# Patient Record
Sex: FEMALE | Race: WHITE | NOT HISPANIC OR LATINO | Employment: FULL TIME | URBAN - METROPOLITAN AREA
[De-identification: names, ages, dates, MRNs, and addresses within clinical notes are randomized per-mention and may not be internally consistent; named-entity substitution may affect disease eponyms.]

---

## 2017-02-10 ENCOUNTER — RADIANT APPOINTMENT (OUTPATIENT)
Dept: MAMMOGRAPHY | Facility: CLINIC | Age: 52
End: 2017-02-10
Attending: FAMILY MEDICINE

## 2017-02-10 DIAGNOSIS — R92.8 ABNORMAL MAMMOGRAM OF BOTH BREASTS: ICD-10-CM

## 2017-05-18 ENCOUNTER — TRANSFERRED RECORDS (OUTPATIENT)
Dept: HEALTH INFORMATION MANAGEMENT | Facility: CLINIC | Age: 52
End: 2017-05-18

## 2017-06-10 ENCOUNTER — HEALTH MAINTENANCE LETTER (OUTPATIENT)
Age: 52
End: 2017-06-10

## 2017-06-22 ASSESSMENT — ENCOUNTER SYMPTOMS
CONSTIPATION: 0
PANIC: 0
BOWEL INCONTINENCE: 0
NERVOUS/ANXIOUS: 1
MUSCLE CRAMPS: 0
ABDOMINAL PAIN: 0
DECREASED LIBIDO: 1
MYALGIAS: 0
HOT FLASHES: 1
NECK PAIN: 0
BLOOD IN STOOL: 0
MUSCLE WEAKNESS: 0
DEPRESSION: 0
HEARTBURN: 0
STIFFNESS: 0
ARTHRALGIAS: 1
DIARRHEA: 0
INSOMNIA: 0
JAUNDICE: 0
RECTAL BLEEDING: 0
VOMITING: 0
BACK PAIN: 0
RECTAL PAIN: 0
BLOATING: 1
DECREASED CONCENTRATION: 0
JOINT SWELLING: 0
NAUSEA: 0

## 2017-06-22 ASSESSMENT — ANXIETY QUESTIONNAIRES
6. BECOMING EASILY ANNOYED OR IRRITABLE: SEVERAL DAYS
7. FEELING AFRAID AS IF SOMETHING AWFUL MIGHT HAPPEN: SEVERAL DAYS
7. FEELING AFRAID AS IF SOMETHING AWFUL MIGHT HAPPEN: SEVERAL DAYS
5. BEING SO RESTLESS THAT IT IS HARD TO SIT STILL: SEVERAL DAYS
GAD7 TOTAL SCORE: 7
GAD7 TOTAL SCORE: 7
3. WORRYING TOO MUCH ABOUT DIFFERENT THINGS: SEVERAL DAYS
1. FEELING NERVOUS, ANXIOUS, OR ON EDGE: SEVERAL DAYS
GAD7 TOTAL SCORE: 7
4. TROUBLE RELAXING: SEVERAL DAYS
2. NOT BEING ABLE TO STOP OR CONTROL WORRYING: SEVERAL DAYS

## 2017-06-23 ASSESSMENT — ANXIETY QUESTIONNAIRES: GAD7 TOTAL SCORE: 7

## 2017-06-27 ENCOUNTER — OFFICE VISIT (OUTPATIENT)
Dept: PSYCHOLOGY | Facility: CLINIC | Age: 52
End: 2017-06-27
Payer: COMMERCIAL

## 2017-06-27 DIAGNOSIS — F41.9 ANXIETY DISORDER, UNSPECIFIED TYPE: Primary | ICD-10-CM

## 2017-06-27 NOTE — MR AVS SNAPSHOT
After Visit Summary   6/27/2017    Nkechi Manrique    MRN: [id number]           Patient Information     Date Of Birth          1965        Visit Information        Provider Department      6/27/2017 2:00 PM Kellie Granados, PhD  [facility]         Today's Diagnoses     Anxiety disorder, unspecified type    -  1       Follow-ups after your visit        Your next 10 appointments already scheduled     Jul 05, 2017 11:00 AM CDT   Return Visit with Kellie Granados, PhD SHANNON   [facility]  ([facility])    [facility]  [address]  [address]  [facility] [address] [phone]            Jul 19, 2017  2:00 PM CDT   Return Visit with Kellie Granados, PhD ORTEGA   [facility]  ([facility])    [facility]  [address]  [address]  [facility] [id number] [phone]            Aug 11, 2017  3:45 PM CDT   New Patient Visit with Maria C Walsh MD   [facility] ([facility])    [facility] [address]  [address]  [address]  [facility] [id number] [phone]              Who to contact     Please call your clinic at [phone] to:    Ask questions about your health    Make or cancel appointments    Discuss your medicines    Learn about your test results    Speak to your doctor   If you have compliments or concerns about an experience at your clinic, or if you wish to file a complaint, please contact [facility] Patient Relations at [phone] or email us at [email or web address]         Additional Information About Your Visit        MyChart Information     [employer] gives you secure access to your electronic health record. If you see a primary care provider, you can also send messages to your care team and make appointments. If you have questions, please call your  primary care clinic.  If you do not have a primary care provider, please call [phone] and they will assist you.      [email or web address] is an electronic gateway that provides easy, online access to your medical records. With [email or web address], you can request a clinic appointment, read your test results, renew a prescription or communicate with your care team.     To access your existing account, please contact your [facility] or call [phone] for assistance.        Care EveryWhere ID     This is your Care EveryWhere ID. This could be used by other organizations to access your [place] medical records  [id number]         Blood Pressure from Last 3 Encounters:   06/28/17 110/72   11/06/15 117/68   10/28/14 92/60    Weight from Last 3 Encounters:   06/28/17 89 kg (196 lb 4.8 oz)   11/02/15 79.4 kg (175 lb)   10/28/14 79.4 kg (175 lb)              We Performed the Following     New Patient Visit - Psychiatric diagnostic interview examination (81877)        Primary Care Provider Office Phone # Fax #    Dinorah Alonso [phone]       [facility] [address] [address]        Equal Access to Services     KAREN DAVID AH: Hadii aad ku hadasho Soomaali, waaxda luqadaha, qaybta kaalmada adeegyada, waxay idiin hayaan adeeg khdebbie layg hinson. So [facility] [phone].    ATENCIÓN: Si habla español, tiene a phelps disposición servicios gratuitos de asistencia lingüística. Llame al [phone].    We comply with applicable federal civil rights laws and [place] laws. We do not discriminate on the basis of race, color, national origin, age, disability sex, sexual orientation or gender identity.            Thank you!     Thank you for choosing [facility]   for your care. Our goal is always to provide you with excellent care. Hearing back from our patients is one way we can continue to improve our services. Please take a few minutes to  complete the written survey that you may receive in the mail after your visit with us. Thank you!             Your Updated Medication List - Protect others around you: Learn how to safely use, store and throw away your medicines at www.[employer]eds.org.          This list is accurate as of: 6/27/17 11:59 PM.  Always use your most recent med list.                   Brand Name Dispense Instructions for use Diagnosis    albuterol 108 (90 BASE) MCG/ACT Inhaler    albuterol    2 Inhaler    Inhale 1-2 puffs into the lungs every 6 hours as needed for shortness of breath / dyspnea    Mild persistent asthma without complication       CALCIUM 500 + D 500-200 MG-IU Tabs      1 po qd    Gynecological examination       CLARITIN PO      1 TABLET DAILY        * fluticasone 110 MCG/ACT Inhaler    FLOVENT HFA    1 Inhaler    Inhale 2 puffs into the lungs 2 times daily    Mild persistent asthma       * fluticasone 110 MCG/ACT Inhaler    FLOVENT HFA    3 Inhaler    Inhale 2 puffs into the lungs 2 times daily    Mild persistent asthma without complication       fluticasone 50 MCG/ACT spray    FLONASE     Spray 2 sprays into both nostrils daily        ketoprofen 10% in PLO 10% topical gel     30 g    Apply a pea-sized amount to the CMC joint on the Right    CMC arthritis       omega 3 1000 MG Caps      None Entered        triamcinolone 0.1 % cream    KENALOG    80 g    Apply sparingly to affected area three times daily as needed for 1-2 weeks    Eczema, unspecified type       vitamin B complex with vitamin C Tabs tablet      Take 1 tablet by mouth daily        VITAMIN C PO           vitamin D 2000 UNITS Caps      Take 1 capsule by mouth daily.        * Notice:  This list has 2 medication(s) that are the same as other medications prescribed for you. Read the directions carefully, and ask your doctor or other care provider to review them with you.

## 2017-06-28 ENCOUNTER — OFFICE VISIT (OUTPATIENT)
Dept: INTERNAL MEDICINE | Facility: CLINIC | Age: 52
End: 2017-06-28
Attending: INTERNAL MEDICINE
Payer: COMMERCIAL

## 2017-06-28 VITALS
WEIGHT: 196.3 LBS | HEIGHT: 68 IN | DIASTOLIC BLOOD PRESSURE: 72 MMHG | HEART RATE: 80 BPM | BODY MASS INDEX: 29.75 KG/M2 | SYSTOLIC BLOOD PRESSURE: 110 MMHG

## 2017-06-28 DIAGNOSIS — R63.5 WEIGHT GAIN: Primary | ICD-10-CM

## 2017-06-28 DIAGNOSIS — T63.441A ALLERGIC REACTION TO BEE STING: ICD-10-CM

## 2017-06-28 RX ORDER — EPINEPHRINE 0.3 MG/.3ML
0.3 INJECTION SUBCUTANEOUS
Qty: 0.3 ML | Refills: 3 | Status: SHIPPED | OUTPATIENT
Start: 2017-06-28 | End: 2019-06-12

## 2017-06-28 ASSESSMENT — ENCOUNTER SYMPTOMS
NUMBNESS: 0
HOARSE VOICE: 0
NAUSEA: 0
TROUBLE SWALLOWING: 0
FATIGUE: 0
POOR WOUND HEALING: 0
TINGLING: 0
PANIC: 0
SPEECH CHANGE: 0
PALPITATIONS: 0
HOT FLASHES: 1
WEIGHT GAIN: 0
FEVER: 0
BRUISES/BLEEDS EASILY: 0
RESPIRATORY PAIN: 0
SINUS PAIN: 0
EYE WATERING: 0
WEAKNESS: 0
NIGHT SWEATS: 0
HEARTBURN: 0
POLYDIPSIA: 0
BLOATING: 1
DIARRHEA: 0
HEMATURIA: 0
HEADACHES: 0
NERVOUS/ANXIOUS: 1
INSOMNIA: 0
DOUBLE VISION: 0
EXTREMITY NUMBNESS: 0
CLAUDICATION: 0
MYALGIAS: 0
SEIZURES: 0
HEMOPTYSIS: 0
SKIN CHANGES: 0
BREAST PAIN: 0
POLYPHAGIA: 0
SORE THROAT: 0
TACHYCARDIA: 0
ALTERED TEMPERATURE REGULATION: 0
ABDOMINAL PAIN: 0
EYE REDNESS: 0
SNORES LOUDLY: 0
SPUTUM PRODUCTION: 0
FLANK PAIN: 0
MUSCLE CRAMPS: 0
ORTHOPNEA: 0
TASTE DISTURBANCE: 0
SLEEP DISTURBANCES DUE TO BREATHING: 0
SINUS CONGESTION: 0
DYSURIA: 0
VOMITING: 0
BACK PAIN: 0
COUGH DISTURBING SLEEP: 0
NECK MASS: 0
COUGH: 0
WEIGHT LOSS: 0
RECTAL PAIN: 0
DYSPNEA ON EXERTION: 0
POSTURAL DYSPNEA: 0
PARALYSIS: 0
CHILLS: 0
SHORTNESS OF BREATH: 0
EYE PAIN: 0
DEPRESSION: 0
LIGHT-HEADEDNESS: 0
DECREASED LIBIDO: 1
WHEEZING: 0
HYPOTENSION: 0
TREMORS: 0
EXERCISE INTOLERANCE: 0
INCREASED ENERGY: 0
CONSTIPATION: 0
LOSS OF CONSCIOUSNESS: 0
STIFFNESS: 0
DECREASED CONCENTRATION: 0
ARTHRALGIAS: 1
DECREASED APPETITE: 0
MEMORY LOSS: 0
MUSCLE WEAKNESS: 0
NECK PAIN: 0
DIZZINESS: 0
BLOOD IN STOOL: 0
DIFFICULTY URINATING: 0
RECTAL BLEEDING: 0
EYE IRRITATION: 0
JAUNDICE: 0
NAIL CHANGES: 0
LEG PAIN: 0
SMELL DISTURBANCE: 0
JOINT SWELLING: 0
DISTURBANCES IN COORDINATION: 0
BOWEL INCONTINENCE: 0
SYNCOPE: 0
SWOLLEN GLANDS: 0
LEG SWELLING: 0
HALLUCINATIONS: 0
BREAST MASS: 0
HYPERTENSION: 0

## 2017-06-28 ASSESSMENT — PAIN SCALES - GENERAL: PAINLEVEL: NO PAIN (0)

## 2017-06-28 NOTE — PROGRESS NOTES
SUBJECTIVE:   CC: Nkechi Manrique is an 51 year old woman who presents for preventive health visit.     Healthy Habits:    Do you get at least three servings of calcium containing foods daily (dairy, green leafy vegetables, etc.)? yes    Amount of exercise or daily activities, outside of work: working out 5 times a week (strength training, running, biking)    Problems taking medications regularly No    Medication side effects: No    Have you had an eye exam in the past two years? yes    Do you see a dentist twice per year? yes    Do you have sleep apnea, excessive snoring or daytime drowsiness?no    Patient is concerned about weight gain. She has been feeling tired. She also reports mood swings (anxiety and feeling overwhelmed). She states that about a year ago she has noticed weight gain without significant change in her daily life. She reports that she gained about 20 lbs in a year. She reports that her periods have stopped last spring. She has had occasional hot flashes. Patient has been sleeping well, waking up fully rested. She has had some changes at work. 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 She was on a steroid inhaler last year for asthma. The inhaler was stopped as she did not need it anymore. She reports that she has not been feeling as hungry.       -------------------------------------    Today's PHQ-2 Score:   PHQ-2 ( 1999 Pf[employer]) 6/22/2017 11/6/2015   Q1: Little interest or pleasure in doing things 0 0   Q2: Feeling down, depressed or hopeless 0 0   PHQ-2 Score 0 0   Q1: Little interest or pleasure in doing things Not at all -   Q2: Feeling down, depressed or hopeless Not at all -   PHQ-2 Score 0 -       Abuse: Current or Past(Physical, Sexual or Emotional)- No  Do you feel safe in your environment - Yes    Social History   Substance Use Topics     Smoking status: Never Smoker     Smokeless tobacco: Never Used     Alcohol use 0.6 oz/week     1 Standard drinks  or equivalent per week      Comment: social, ~1-2/wk     The patient does not drink >3 drinks per day nor >7 drinks per week.    Reviewed orders with patient.  Reviewed health maintenance and updated orders accordingly - Yes  Labs reviewed in [facility]    Patient over age 50, mutual decision to screen reflected in health maintenance.    Pertinent mammograms are reviewed under the imaging tab.  History of abnormal Pap smear: NO - age 30-65 PAP every 5 years with negative HPV co-testing recommended    Reviewed and updated as needed this visit by clinical staff  Tobacco  Allergies  Meds         Reviewed and updated as needed this visit by Provider        Past Medical History:   Diagnosis Date     Breast lump 9/27/2013     CMC arthritis, thumb, degenerative 11/3/2015     Congenital deficiency of other clotting factors     Factor V Leiden positive, had superficial thrombus, no DVT     Congenital deficiency of other clotting factors      Mild intermittent asthma with exacerbation     seasonally related, rare albuterol     Plantar fascia syndrome     L ft in '10, R ft in '11     Seasonal allergic rhinitis     claritin spring/August     Stress fracture of lower leg ~2002, 2009      Past Surgical History:   Procedure Laterality Date     PHLEBECTOMY MULTIPLE STAB  10/15    [place] [facility], Dr. Ramirez       ROS:  Review of Systems     Constitutional:  Negative for fever, chills, weight loss, weight gain, fatigue, decreased appetite, night sweats, recent stressors, height gain, height loss, post-operative complications, incisional pain, hallucinations, increased energy, hyperactivity and confused.   HENT:  Negative for ear pain, hearing loss, tinnitus, nosebleeds, trouble swallowing, hoarse voice, mouth sores, sore throat, ear discharge, tooth pain, gum tenderness, taste disturbance, smell disturbance, hearing aid, bleeding gums, dry mouth, sinus pain, sinus congestion and neck mass.    Eyes:  Negative for double vision, pain, redness, eye  pain, decreased vision, eye watering, eye bulging, eye dryness, flashing lights, spots, floaters, strabismus, tunnel vision, jaundice and eye irritation.   Respiratory:   Negative for cough, hemoptysis, sputum production, shortness of breath, wheezing, sleep disturbances due to breathing, snores loudly, respiratory pain, dyspnea on exertion, cough disturbing sleep and postural dyspnea.    Cardiovascular:  Negative for chest pain, dyspnea on exertion, palpitations, orthopnea, claudication, leg swelling, fingers/toes turn blue, hypertension, hypotension, syncope, history of heart murmur, chest pain on exertion, chest pain at rest, pacemaker, few scattered varicosities, leg pain, sleep disturbances due to breathing, tachycardia, light-headedness, exercise intolerance and edema.   Gastrointestinal:  Positive for bloating. Negative for heartburn, nausea, vomiting, abdominal pain, diarrhea, constipation, blood in stool, melena, rectal pain, hemorrhoids, bowel incontinence, jaundice, rectal bleeding, coffee ground emesis and change in stool.   Genitourinary:  Positive for decreased libido, hot flashes and vaginal dryness. Negative for bladder incontinence, dysuria, urgency, hematuria, flank pain, vaginal discharge, difficulty urinating, genital sores, dyspareunia, nocturia, voiding less frequently, arousal difficulty, abnormal vaginal bleeding, excessive menstruation, menstrual changes and postmenopausal bleeding.   Musculoskeletal:  Positive for arthralgias. Negative for myalgias, back pain, joint swelling, stiffness, muscle cramps, neck pain, bone pain, muscle weakness and fracture.   Skin:  Negative for nail changes, itching, poor wound healing, rash, hair changes, skin changes, acne, warts, poor wound healing, scarring, flaky skin, Raynaud's phenomenon, sensitivity to sunlight and skin thickening.   Neurological:  Negative for dizziness, tingling, tremors, speech change, seizures, loss of consciousness, weakness,  "light-headedness, numbness, headaches, disturbances in coordination, extremity numbness, memory loss, difficulty walking and paralysis.   Endo/Heme:  Negative for anemia, swollen glands and bruises/bleeds easily.   Psychiatric/Behavioral:  Positive for mood swings. Negative for depression, hallucinations, memory loss, decreased concentration and panic attacks.    Breast:  Negative for breast discharge, breast mass, breast pain and nipple retraction.   Endocrine:  Negative for altered temperature regulation, polyphagia, polydipsia, unwanted hair growth and change in facial hair.      CONSTITUTIONAL:fatigue  I: NEGATIVE for worrisome rashes, moles or lesions  E: NEGATIVE for vision changes or irritation  ENT: NEGATIVE for ear, mouth and throat problems  R: NEGATIVE for significant cough or SOB  B: NEGATIVE for masses, tenderness or discharge  CV: NEGATIVE for chest pain, palpitations or peripheral edema  GI: NEGATIVE for nausea, abdominal pain, heartburn, or change in bowel habits  : NEGATIVE for unusual urinary or vaginal symptoms. No vaginal bleeding.  M: NEGATIVE for significant arthralgias or myalgia  N: NEGATIVE for weakness, dizziness or paresthesias  PSYCHIATRIC: anxiety     OBJECTIVE:   /72  Pulse 80  Ht 1.727 m (5' 8\")  Wt 89 kg (196 lb 4.8 oz)  LMP 10/16/2015 (Exact Date)  Breastfeeding? No  BMI 29.85 kg/m2  EXAM:  GENERAL: healthy, alert and no distress  EYES: Eyes grossly normal to inspection  HENT: normal cephalic/atraumatic and oral mucous membranes moist  NECK: no asymmetry, masses, or scars  RESP: normal effort  CV: regular rates and rhythm and no peripheral edema  ABDOMEN: soft, nontender, no hepatosplenomegaly, no masses and bowel sounds normal  MS: no gross musculoskeletal defects noted, no edema  SKIN: no suspicious lesions or rashes  NEURO: Normal strength and tone, mentation intact and speech normal  PSYCH: mentation appears normal, affect normal/bright    ASSESSMENT/PLAN:   1. " "Routine preventive health.   Discussed vaccinations, breast and colon cancer screening. Patient is up to date at this time.     2. Weight gain  Extensive discussion of possible causes of weight gain with patient. Discussed lifestyle modifications approach. Patient is not convinced that the excess calories is the only reason for her weight changes. Recommend evaluation by [facility] once medical causes of excess weight have been excluded. Patient is in agreement with the plan.   - Hepatic Panel; Future  - Ferritin; Future  - Basic Metabolic Panel; Future  - CBC with Platelets; Future  - TSH with free T4 reflex; Future    2. Allergic reaction to bee sting  Refill for EpiPen was given to the patient.   - EPINEPHrine 0.3 MG/0.3ML injection; Inject 0.3 mLs (0.3 mg) into the muscle once as needed for anaphylaxis  Dispense: 0.3 mL; Refill: 3        COUNSELING:   Reviewed preventive health counseling, as reflected in patient instructions       Regular exercise       Healthy diet/nutrition    BP Screening:   Last 3 BP Readings:    BP Readings from Last 3 Encounters:   06/28/17 110/72   11/06/15 117/68   10/28/14 92/60       The following was recommended to the patient:  Re-screen BP within a year and recommended lifestyle modifications     reports that she has never smoked. She has never used smokeless tobacco.    Estimated body mass index is 29.85 kg/(m^2) as calculated from the following:    Height as of this encounter: 1.727 m (5' 8\").    Weight as of this encounter: 89 kg (196 lb 4.8 oz).       Counseling Resources:  ATP IV Guidelines  Pooled Cohorts Equation Calculator  Breast Cancer Risk Calculator  FRAX Risk Assessment  ICSI Preventive Guidelines  Dietary Guidelines for Americans, 2010  USDA's MyPlate  ASA Prophylaxis  Lung CA Screening    Katherine Angela MD  [facility]   "

## 2017-06-28 NOTE — LETTER
6/28/2017       RE: Nkechi Manrique  [address] [facility] [address]     Dear Colleague,    Thank you for referring your patient, Nkechi Manrique, to the [facility]  at [facility]. Please see a copy of my visit note below.       SUBJECTIVE:   CC: Nkechi Manrique is an 51 year old woman who presents for preventive health visit.     Healthy Habits:    Do you get at least three servings of calcium containing foods daily (dairy, green leafy vegetables, etc.)? yes    Amount of exercise or daily activities, outside of work: working out 5 times a week (strength training, running, biking)    Problems taking medications regularly No    Medication side effects: No    Have you had an eye exam in the past two years? yes    Do you see a dentist twice per year? yes    Do you have sleep apnea, excessive snoring or daytime drowsiness?no    Patient is concerned about weight gain. She has been feeling tired. She also reports mood swings (anxiety and feeling overwhelmed). She states that about a year ago she has noticed weight gain without significant change in her daily life. She reports that she gained about 20 lbs in a year. She reports that her periods have stopped last spring. She has had occasional hot flashes. Patient has been sleeping well, waking up fully rested. She has had some changes at work. 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 She was on a steroid inhaler last year for asthma. The inhaler was stopped as she did not need it anymore. She reports that she has not been feeling as hungry.       -------------------------------------    Today's PHQ-2 Score:   PHQ-2 ( 1999 Pf[employer]) 6/22/2017 11/6/2015   Q1: Little interest or pleasure in doing things 0 0   Q2: Feeling down, depressed or hopeless 0 0   PHQ-2 Score 0 0   Q1: Little interest or pleasure in doing things Not at all -   Q2: Feeling down, depressed or  hopeless Not at all -   PHQ-2 Score 0 -       Abuse: Current or Past(Physical, Sexual or Emotional)- No  Do you feel safe in your environment - Yes    Social History   Substance Use Topics     Smoking status: Never Smoker     Smokeless tobacco: Never Used     Alcohol use 0.6 oz/week     1 Standard drinks or equivalent per week      Comment: social, ~1-2/wk     The patient does not drink >3 drinks per day nor >7 drinks per week.    Reviewed orders with patient.  Reviewed health maintenance and updated orders accordingly - Yes  Labs reviewed in [facility]    Patient over age 50, mutual decision to screen reflected in health maintenance.    Pertinent mammograms are reviewed under the imaging tab.  History of abnormal Pap smear: NO - age 30-65 PAP every 5 years with negative HPV co-testing recommended    Reviewed and updated as needed this visit by clinical staff  Tobacco  Allergies  Meds         Reviewed and updated as needed this visit by Provider        Past Medical History:   Diagnosis Date     Breast lump 9/27/2013     CMC arthritis, thumb, degenerative 11/3/2015     Congenital deficiency of other clotting factors     Factor V Leiden positive, had superficial thrombus, no DVT     Congenital deficiency of other clotting factors      Mild intermittent asthma with exacerbation     seasonally related, rare albuterol     Plantar fascia syndrome     L ft in '10, R ft in '11     Seasonal allergic rhinitis     claritin spring/August     Stress fracture of lower leg ~2002, 2009      Past Surgical History:   Procedure Laterality Date     PHLEBECTOMY MULTIPLE STAB  10/15    [place] [facility], Dr. Ramirez       ROS:  Review of Systems     Constitutional:  Negative for fever, chills, weight loss, weight gain, fatigue, decreased appetite, night sweats, recent stressors, height gain, height loss, post-operative complications, incisional pain, hallucinations, increased energy, hyperactivity and confused.   HENT:  Negative for ear pain, hearing  loss, tinnitus, nosebleeds, trouble swallowing, hoarse voice, mouth sores, sore throat, ear discharge, tooth pain, gum tenderness, taste disturbance, smell disturbance, hearing aid, bleeding gums, dry mouth, sinus pain, sinus congestion and neck mass.    Eyes:  Negative for double vision, pain, redness, eye pain, decreased vision, eye watering, eye bulging, eye dryness, flashing lights, spots, floaters, strabismus, tunnel vision, jaundice and eye irritation.   Respiratory:   Negative for cough, hemoptysis, sputum production, shortness of breath, wheezing, sleep disturbances due to breathing, snores loudly, respiratory pain, dyspnea on exertion, cough disturbing sleep and postural dyspnea.    Cardiovascular:  Negative for chest pain, dyspnea on exertion, palpitations, orthopnea, claudication, leg swelling, fingers/toes turn blue, hypertension, hypotension, syncope, history of heart murmur, chest pain on exertion, chest pain at rest, pacemaker, few scattered varicosities, leg pain, sleep disturbances due to breathing, tachycardia, light-headedness, exercise intolerance and edema.   Gastrointestinal:  Positive for bloating. Negative for heartburn, nausea, vomiting, abdominal pain, diarrhea, constipation, blood in stool, melena, rectal pain, hemorrhoids, bowel incontinence, jaundice, rectal bleeding, coffee ground emesis and change in stool.   Genitourinary:  Positive for decreased libido, hot flashes and vaginal dryness. Negative for bladder incontinence, dysuria, urgency, hematuria, flank pain, vaginal discharge, difficulty urinating, genital sores, dyspareunia, nocturia, voiding less frequently, arousal difficulty, abnormal vaginal bleeding, excessive menstruation, menstrual changes and postmenopausal bleeding.   Musculoskeletal:  Positive for arthralgias. Negative for myalgias, back pain, joint swelling, stiffness, muscle cramps, neck pain, bone pain, muscle weakness and fracture.   Skin:  Negative for nail changes,  "itching, poor wound healing, rash, hair changes, skin changes, acne, warts, poor wound healing, scarring, flaky skin, Raynaud's phenomenon, sensitivity to sunlight and skin thickening.   Neurological:  Negative for dizziness, tingling, tremors, speech change, seizures, loss of consciousness, weakness, light-headedness, numbness, headaches, disturbances in coordination, extremity numbness, memory loss, difficulty walking and paralysis.   Endo/Heme:  Negative for anemia, swollen glands and bruises/bleeds easily.   Psychiatric/Behavioral:  Positive for mood swings. Negative for depression, hallucinations, memory loss, decreased concentration and panic attacks.    Breast:  Negative for breast discharge, breast mass, breast pain and nipple retraction.   Endocrine:  Negative for altered temperature regulation, polyphagia, polydipsia, unwanted hair growth and change in facial hair.      CONSTITUTIONAL:fatigue  I: NEGATIVE for worrisome rashes, moles or lesions  E: NEGATIVE for vision changes or irritation  ENT: NEGATIVE for ear, mouth and throat problems  R: NEGATIVE for significant cough or SOB  B: NEGATIVE for masses, tenderness or discharge  CV: NEGATIVE for chest pain, palpitations or peripheral edema  GI: NEGATIVE for nausea, abdominal pain, heartburn, or change in bowel habits  : NEGATIVE for unusual urinary or vaginal symptoms. No vaginal bleeding.  M: NEGATIVE for significant arthralgias or myalgia  N: NEGATIVE for weakness, dizziness or paresthesias  PSYCHIATRIC: anxiety     OBJECTIVE:   /72  Pulse 80  Ht 1.727 m (5' 8\")  Wt 89 kg (196 lb 4.8 oz)  LMP 10/16/2015 (Exact Date)  Breastfeeding? No  BMI 29.85 kg/m2  EXAM:  GENERAL: healthy, alert and no distress  EYES: Eyes grossly normal to inspection  HENT: normal cephalic/atraumatic and oral mucous membranes moist  NECK: no asymmetry, masses, or scars  RESP: normal effort  CV: regular rates and rhythm and no peripheral edema  ABDOMEN: soft, nontender, " "no hepatosplenomegaly, no masses and bowel sounds normal  MS: no gross musculoskeletal defects noted, no edema  SKIN: no suspicious lesions or rashes  NEURO: Normal strength and tone, mentation intact and speech normal  PSYCH: mentation appears normal, affect normal/bright    ASSESSMENT/PLAN:   1. Routine preventive health.   Discussed vaccinations, breast and colon cancer screening. Patient is up to date at this time.     2. Weight gain  Extensive discussion of possible causes of weight gain with patient. Discussed lifestyle modifications approach. Patient is not convinced that the excess calories is the only reason for her weight changes. Recommend evaluation by [facility] once medical causes of excess weight have been excluded. Patient is in agreement with the plan.   - Hepatic Panel; Future  - Ferritin; Future  - Basic Metabolic Panel; Future  - CBC with Platelets; Future  - TSH with free T4 reflex; Future    2. Allergic reaction to bee sting  Refill for EpiPen was given to the patient.   - EPINEPHrine 0.3 MG/0.3ML injection; Inject 0.3 mLs (0.3 mg) into the muscle once as needed for anaphylaxis  Dispense: 0.3 mL; Refill: 3        COUNSELING:   Reviewed preventive health counseling, as reflected in patient instructions       Regular exercise       Healthy diet/nutrition    BP Screening:   Last 3 BP Readings:    BP Readings from Last 3 Encounters:   06/28/17 110/72   11/06/15 117/68   10/28/14 92/60       The following was recommended to the patient:  Re-screen BP within a year and recommended lifestyle modifications     reports that she has never smoked. She has never used smokeless tobacco.    Estimated body mass index is 29.85 kg/(m^2) as calculated from the following:    Height as of this encounter: 1.727 m (5' 8\").    Weight as of this encounter: 89 kg (196 lb 4.8 oz).     Again, thank you for allowing me to participate in the care of your patient.      Katherine Angela MD    "

## 2017-06-29 DIAGNOSIS — R63.5 WEIGHT GAIN: ICD-10-CM

## 2017-06-29 LAB
ALBUMIN SERPL-MCNC: 4 G/DL (ref 3.4–5)
ALP SERPL-CCNC: 77 U/L (ref 40–150)
ALT SERPL W P-5'-P-CCNC: 26 U/L (ref 0–50)
ANION GAP SERPL CALCULATED.3IONS-SCNC: 9 MMOL/L (ref 3–14)
AST SERPL W P-5'-P-CCNC: 21 U/L (ref 0–45)
BILIRUB DIRECT SERPL-MCNC: 0.1 MG/DL (ref 0–0.2)
BILIRUB SERPL-MCNC: 0.4 MG/DL (ref 0.2–1.3)
BUN SERPL-MCNC: 13 MG/DL (ref 7–30)
CALCIUM SERPL-MCNC: 9.2 MG/DL (ref 8.5–10.1)
CHLORIDE SERPL-SCNC: 104 MMOL/L (ref 94–109)
CO2 SERPL-SCNC: 26 MMOL/L (ref 20–32)
CREAT SERPL-MCNC: 0.79 MG/DL (ref 0.52–1.04)
ERYTHROCYTE [DISTWIDTH] IN BLOOD BY AUTOMATED COUNT: 12.8 % (ref 10–15)
FERRITIN SERPL-MCNC: 30 NG/ML (ref 8–252)
GFR SERPL CREATININE-BSD FRML MDRD: 76 ML/MIN/1.7M2
GLUCOSE SERPL-MCNC: 94 MG/DL (ref 70–99)
HCT VFR BLD AUTO: 41.2 % (ref 35–47)
HGB BLD-MCNC: 13.7 G/DL (ref 11.7–15.7)
MCH RBC QN AUTO: 29.8 PG (ref 26.5–33)
MCHC RBC AUTO-ENTMCNC: 33.3 G/DL (ref 31.5–36.5)
MCV RBC AUTO: 90 FL (ref 78–100)
PLATELET # BLD AUTO: 191 10E9/L (ref 150–450)
POTASSIUM SERPL-SCNC: 4 MMOL/L (ref 3.4–5.3)
PROT SERPL-MCNC: 7.7 G/DL (ref 6.8–8.8)
RBC # BLD AUTO: 4.6 10E12/L (ref 3.8–5.2)
SODIUM SERPL-SCNC: 139 MMOL/L (ref 133–144)
TSH SERPL DL<=0.005 MIU/L-ACNC: 1.66 MU/L (ref 0.4–4)
WBC # BLD AUTO: 8.6 10E9/L (ref 4–11)

## 2017-06-29 PROCEDURE — 82728 ASSAY OF FERRITIN: CPT | Performed by: INTERNAL MEDICINE

## 2017-06-29 PROCEDURE — 80076 HEPATIC FUNCTION PANEL: CPT | Performed by: INTERNAL MEDICINE

## 2017-06-29 PROCEDURE — 85027 COMPLETE CBC AUTOMATED: CPT | Performed by: INTERNAL MEDICINE

## 2017-06-29 PROCEDURE — 80048 BASIC METABOLIC PNL TOTAL CA: CPT | Performed by: INTERNAL MEDICINE

## 2017-06-29 PROCEDURE — 84443 ASSAY THYROID STIM HORMONE: CPT | Performed by: INTERNAL MEDICINE

## 2017-06-29 PROCEDURE — 36415 COLL VENOUS BLD VENIPUNCTURE: CPT | Performed by: INTERNAL MEDICINE
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
## 2017-07-05 ENCOUNTER — OFFICE VISIT (OUTPATIENT)
Dept: PSYCHOLOGY | Facility: CLINIC | Age: 52
End: 2017-07-05
Payer: COMMERCIAL

## 2017-07-05 DIAGNOSIS — F41.9 ANXIETY DISORDER, UNSPECIFIED TYPE: Primary | ICD-10-CM

## 2017-07-05 NOTE — MR AVS SNAPSHOT
After Visit Summary   7/5/2017    Nkechi Manrique    MRN: [id number]           Patient Information     Date Of Birth          1965        Visit Information        Provider Department      7/5/2017 11:00 AM Kellie Granados, PhD  [facility]         Today's Diagnoses     Anxiety disorder, unspecified type    -  1       Follow-ups after your visit        Your next 10 appointments already scheduled     Jul 19, 2017  2:00 PM CDT   Return Visit with Kellie Granados, PhD    [facility]  ([facility])    [facility]  [address]  [address]  [facility] [address] [phone]            Aug 02, 2017  2:00 PM CDT   Return Visit with Kellie Granados, PhD    [facility]  ([facility])    [facility]  [address]  [address]  [facility] [id number] [phone]            Aug 08, 2017  2:00 PM CDT   Return Visit with Kellie Granados, PhD    [facility]  ([facility])    [facility]  [address]  [address]  [facility] [id number] [phone]            Aug 11, 2017  3:45 PM CDT   New Patient Visit with Maria C Walsh MD   [facility] ([facility])    [facility] [address]  [address]  [address]  [facility] [id number] [phone]              Who to contact     Please call your clinic at [phone] to:    Ask questions about your health    Make or cancel appointments    Discuss your medicines    Learn about your test results    Speak to your doctor   If you have compliments or concerns about an experience at your clinic, or if you wish to file a complaint, please contact [facility] Patient Relations at [phone] or email us at [email or web address]         Additional Information About  Your Visit        [employer] Information     [employer] gives you secure access to your electronic health record. If you see a primary care provider, you can also send messages to your care team and make appointments. If you have questions, please call your primary care clinic.  If you do not have a primary care provider, please call [phone] and they will assist you.      [employer] is an electronic gateway that provides easy, online access to your medical records. With [employer], you can request a clinic appointment, read your test results, renew a prescription or communicate with your care team.     To access your existing account, please contact your [facility] or call [phone] for assistance.        Care EveryWhere ID     This is your Care EveryWhere ID. This could be used by other organizations to access your [facility] medical records  [id number]        Your Vitals Were     Last Period                   10/16/2015 (Exact Date)            Blood Pressure from Last 3 Encounters:   06/28/17 110/72   11/06/15 117/68   10/28/14 92/60    Weight from Last 3 Encounters:   06/28/17 89 kg (196 lb 4.8 oz)   11/02/15 79.4 kg (175 lb)   10/28/14 79.4 kg (175 lb)              We Performed the Following     Individual Psychotherapy - Psychotherapy with pt and/or family present  60 mins [53+ mins] (01911)        Primary Care Provider Office Phone # Fax #    Dinorah Alonso [phone]       [facility] [address] [address]        Equal Access to Services     KAREN DAVID AH: Hadii aad ku hadasho Soomaali, waaxda luqadaha, qaybta kaalmada adeegyada, waxay idiin hayamerican dg pierce'mamadou . So [facility] [phone].    ATENCIÓN: Si habla español, tiene a phelps disposición servicios gratuitos de asistencia lingüística. Llame al [phone].    We comply with applicable federal civil rights laws and [place] laws. We do not discriminate on the  basis of race, color, national origin, age, disability sex, sexual orientation or gender identity.            Thank you!     Thank you for choosing [facility]   for your care. Our goal is always to provide you with excellent care. Hearing back from our patients is one way we can continue to improve our services. Please take a few minutes to complete the written survey that you may receive in the mail after your visit with us. Thank you!             Your Updated Medication List - Protect others around you: Learn how to safely use, store and throw away your medicines at www.disposemymeds.org.          This list is accurate as of: 7/5/17 11:59 PM.  Always use your most recent med list.                   Brand Name Dispense Instructions for use Diagnosis    albuterol 108 (90 BASE) MCG/ACT Inhaler    albuterol    2 Inhaler    Inhale 1-2 puffs into the lungs every 6 hours as needed for shortness of breath / dyspnea    Mild persistent asthma without complication       CLARITIN PO      1 TABLET DAILY        EPINEPHrine 0.3 MG/0.3ML injection     0.3 mL    Inject 0.3 mLs (0.3 mg) into the muscle once as needed for anaphylaxis    Allergic reaction to bee sting       fluticasone 50 MCG/ACT spray    FLONASE     Spray 2 sprays into both nostrils daily        ketoprofen 10% in PLO 10% topical gel     30 g    Apply a pea-sized amount to the CMC joint on the Right    CMC arthritis       mometasone-formoterol 200-5 MCG/ACT oral inhaler    DULERA     Inhale 2 puffs into the lungs 2 times daily        triamcinolone 0.1 % cream    KENALOG    80 g    Apply sparingly to affected area three times daily as needed for 1-2 weeks    Eczema, unspecified type       vitamin B complex with vitamin C Tabs tablet      Take 1 tablet by mouth daily

## 2017-07-07 NOTE — PROGRESS NOTES
"  Name:  Nkechi Manrique  Mrn: [id number]  Date of visit: 7/7/2017    PSYCHOLOGY OUTPATIENT VISIT NOTE       PRESENTING PROBLEMS/SYMPTOMS:  \"overwhelmed.\"  Energy low.  30 lb Wt gain in past year with uncertain cause.  Sleep  good,  and awakening with energy.   High level of guilt  but difficulty describing this guilt around her  family connection.   Denied lethargy.  Difficulty around decision making and questioning self/self doubt, deferring to others.  Self esteem lower as she realized  I need to get help  and poor body image.   A lot of anxiety, daily  around work, unable to control.  Anxiety associated with Irritability, muscle tension, restlessness, GI distress.  Social fears.    INTERVENTION AND RESPONSE:  Cognitive Behavioral therapy, individual.  This is patient's first psychotherapy visit following the initial psychological assessment. Time spent treatment planning, See EMR.  Described difficulty coping with anxiety at work, attempts to use breathing techniques, cognitive techniques and \"getting away\" on breaks.  Belief: there is no help, you should just get over it.  Ed provided re possibility of PTSD, to assess.    ASSESSMENT: future oriented.  Distressed and tearful.  Interested in engaging in treatment.  Although she is denying anhedonia and sadness or depression, her presentation is consistent with a depressive disorder.  Possibility that she has \"normalized\" sadness and anhedonia, to continue to monitor for depression.  Symptoms consistent with MEERA but with focus in one domain only, again may be possibilty of underreporting or lack of awareness around scope of worry.    Mental Status Assessment:  Appearance:   Appropriate   Eye Contact:   Good   Psychomotor Behavior: Normal   Attitude:   Cooperative   Orientation:   All  Speech   Rate / Production: Normal and tendency to ask therapist if she is \"making sense.\"   Volume:  Normal   Mood:    dysphoric  Thought Content:  Clear   Thought Form:  Coherent  " Logical   Insight:    Fair     DIAGNOSIS:  Unspecified anxiety disorder; R/O MEERA; R/O PTSD; R/O depression    PLAN:    Patient to schedule followup visit.       Total time spent equals 55 minutes, individual psychotherapy.

## 2017-07-19 ENCOUNTER — OFFICE VISIT (OUTPATIENT)
Dept: PSYCHOLOGY | Facility: CLINIC | Age: 52
End: 2017-07-19
Payer: COMMERCIAL

## 2017-07-19 DIAGNOSIS — F43.10 PTSD (POST-TRAUMATIC STRESS DISORDER): Primary | ICD-10-CM

## 2017-07-19 DIAGNOSIS — F41.9 ANXIETY DISORDER, UNSPECIFIED TYPE: ICD-10-CM

## 2017-07-19 NOTE — PROGRESS NOTES
"  Name:  Nkechi Manrique  Mrn: [id number]  Date of visit: 7/19/2017    PSYCHOLOGY OUTPATIENT VISIT NOTE       PRESENTING PROBLEMS/SYMPTOMS:  \"overwhelmed.\"  Energy low.  30 lb Wt gain in past year with uncertain cause.  Sleep  good,  and awakening with energy.   High level of guilt  but difficulty describing this guilt around her  family connection.   Denied lethargy.  Difficulty around decision making and questioning self/self doubt, deferring to others.  Self esteem lower as she realized  I need to get help  and poor body image.   A lot of anxiety, daily  around work, unable to control.  Anxiety associated with Irritability, muscle tension, restlessness, GI distress.  Social fears.    INTERVENTION AND RESPONSE:  Cognitive Behavioral therapy, individual.  Pt reported visit to [facility] with initiation of progesterone on 7/10.  Sleep improved, calmness (e.g. When driving to work), decreased sense of increased heart rate episodes, and increase \"sense of humor.\"  Also started running program end of June.  Discussed and possibility that several factors have contributed to her improved symptoms.  Reviewed therapy goals and expressed satisfaction.  Worked on goal of PTSD assessment in this visit (see below).  Pt recognized difficulty with feeling positive emotions chronic and beginning in childhood, discussed in context of developmental trauma and importance of recognizing this.    ASSESSMENT: future oriented.  Distressed and tearful.  Reporting sx improvement coinciding with initiation of progesterone (note reporting improved sleep when she had previously reported \"good\" sleep; she had difficulty explaining this discrepancy and likely related to difficulty with assessing self and symptoms).  PTSD sxs: traumatic experience of patient dying, sense of others \"working\" on pt and \"she's not even a person.\" Intrusive experience of distress and physical anxiety with cues (e.g. Walking by room or bay, sounds of buzzers " "and alarms); Avoidance of reminders, e.g. Conversations, the pt's old room.  Persistent state of anxiety, ( Belief: there is no help, you should just get over it); increased irritability, hypervigilance and concentration problems.)  Meets criteria for PTSD.  Continuing context: Although she is denying anhedonia and sadness or depression, her presentation is consistent with a depressive disorder.  Possibility that she has \"normalized\" sadness and anhedonia. To continue to monitor for depression.  Symptoms consistent with MEERA but with focus in one domain only, again may be possibilty of underreporting or lack of awareness around scope of worry.     Belief: there is no help, you should just get over it.    Mental Status Assessment:  Appearance:   Appropriate   Eye Contact:   Good   Psychomotor Behavior: Normal   Attitude:   Cooperative   Orientation:   All  Speech   Rate / Production: Normal and tendency to ask therapist if she is \"making sense.\"   Volume:  Normal   Mood:    Dysphoric, tearful  Thought Content:  Clear   Thought Form:  Coherent  Logical   Insight:    Fair     DIAGNOSIS:  PTSD; Unspecified anxiety disorder; R/O depression  PLAN:    Patient to schedule followup visit.       Total time spent equals 55 minutes, individual psychotherapy.             "

## 2017-08-02 ENCOUNTER — OFFICE VISIT (OUTPATIENT)
Dept: PSYCHOLOGY | Facility: CLINIC | Age: 52
End: 2017-08-02
Payer: COMMERCIAL

## 2017-08-02 DIAGNOSIS — F43.10 PTSD (POST-TRAUMATIC STRESS DISORDER): ICD-10-CM

## 2017-08-02 DIAGNOSIS — F41.9 ANXIETY DISORDER, UNSPECIFIED TYPE: Primary | ICD-10-CM

## 2017-08-02 NOTE — PROGRESS NOTES
"  Name:  Nkechi Manrique  Mrn: [id number]  Date of visit: 2017    PSYCHOLOGY OUTPATIENT VISIT NOTE       PRESENTING PROBLEMS/SYMPTOMS:  \"overwhelmed.\"  Energy low.  30 lb Wt gain in past year with uncertain cause.  Sleep  good,  and awakening with energy.   High level of guilt  but difficulty describing this guilt around her  family connection.   Denied lethargy.  Difficulty around decision making and questioning self/self doubt, deferring to others.  Self esteem lower as she realized  I need to get help  and poor body image.   A lot of anxiety, daily  around work, unable to control.  Anxiety associated with Irritability, muscle tension, restlessness, GI distress.  Social fears. (boyfriend = Artemio)   INTERVENTION AND RESPONSE:  Cognitive Behavioral therapy, individual.  Pt reported 2 episodes of friends/colleagues discussing anxiety.  Realizing they are \"strong\" and can be strong and anxious.  Shame around her own anxiety and not sharing with others. Missing unconditional love in childhood, no love, mom critical.  \"always have to overachieve; don't mess up, don't have feelings, be better be more.  Being \"skinny\" because mom criticized wt.  Not acknowledged.  Bro  when she was in highschool and he did not get love.  Visit with mom in  and mom being nice to get support from pt, \"she was never there for me but I have to be there for her.\"  Also pattern of saying \"I don't know,\" and plan to ask what she knows.    ASSESSMENT: future oriented.  Continuing on progesterone and reporting improvement in mental health with this RX.  Increasing mindfulness and challenging belief that negative emotions and anxiety are \"weak\".  Grief around dearth of love in childhood.    PTSD sxs: traumatic experience of patient dying, sense of others \"working\" on pt and \"she's not even a person.\" Intrusive experience of distress and physical anxiety with cues (e.g. Walking by room or bay, sounds of buzzers and alarms); Avoidance of " "reminders, e.g. Conversations, the pt's old room.  Persistent state of anxiety, ( Belief: there is no help, you should just get over it); increased irritability, hypervigilance and concentration problems.)  Meets criteria for PTSD.  Continuing context: Although she is denying anhedonia and sadness or depression, her presentation is consistent with a depressive disorder.  Possibility that she has \"normalized\" sadness and anhedonia. To continue to monitor for depression.  Symptoms consistent with MEERA but with focus in one domain only, again may be possibilty of underreporting or lack of awareness around scope of worry.     Belief: there is no help, you should just get over it.    Mental Status Assessment:  Appearance:   Appropriate   Eye Contact:   Good   Psychomotor Behavior: Normal   Attitude:   Cooperative   Orientation:   All  Speech   Rate / Production: Normal and tendency to ask therapist if she is \"making sense.\"   Volume:  Normal   Mood:    Dysphoric, tearful (laughing)  Thought Content:  Clear   Thought Form:  Coherent  Logical   Insight:    Fair     DIAGNOSIS:  Unspecified anxiety disorder; PTSD; R/O depression  PLAN:    Patient to schedule followup visit.       Total time spent equals 55 minutes, individual psychotherapy.             "

## 2017-08-02 NOTE — MR AVS SNAPSHOT
After Visit Summary   8/2/2017    Nkechi Manrique    MRN: [id number]           Patient Information     Date Of Birth          1965        Visit Information        Provider Department      8/2/2017 2:00 PM Kellie Granados, PhD [facility]         Today's Diagnoses     Anxiety disorder, unspecified type    -  1    PTSD (post-traumatic stress disorder)           Follow-ups after your visit        Your next 10 appointments already scheduled     Aug 08, 2017  2:00 PM CDT   Return Visit with Kellie Granados, PhD [facility]   [facility]  ([facility])    [place] [facility]  [address]  [address]  [facility] [address] [phone]            Aug 11, 2017  3:45 PM CDT   New Patient Visit with Maria C Walsh MD   [facility] ([facility])    [place] [facility] [address]  [address]  [address]  [facility] [address] [phone]              Who to contact     Please call your clinic at [phone] to:    Ask questions about your health    Make or cancel appointments    Discuss your medicines    Learn about your test results    Speak to your doctor   If you have compliments or concerns about an experience at your clinic, or if you wish to file a complaint, please contact [facility] Patient Relations at [phone] or email us at [email or web address]         Additional Information About Your Visit        MyChart Information     [employer] gives you secure access to your electronic health record. If you see a primary care provider, you can also send messages to your care team and make appointments. If you have questions, please call your primary care clinic.  If you do not have a primary care provider, please call [phone] and they will assist you.      Sorbisense is an electronic gateway that provides easy, online access to your medical  records. With Pembe Panjur, you can request a clinic appointment, read your test results, renew a prescription or communicate with your care team.     To access your existing account, please contact your [facility] or call [phone] for assistance.        Care EveryWhere ID     This is your Care EveryWhere ID. This could be used by other organizations to access your [place] medical records  [id number]        Your Vitals Were     Last Period                   10/16/2015 (Exact Date)            Blood Pressure from Last 3 Encounters:   06/28/17 110/72   11/06/15 117/68   10/28/14 92/60    Weight from Last 3 Encounters:   06/28/17 89 kg (196 lb 4.8 oz)   11/02/15 79.4 kg (175 lb)   10/28/14 79.4 kg (175 lb)              We Performed the Following     Individual Psychotherapy - Psychotherapy with pt and/or family present  60 mins [53+ mins] (79[address])        Primary Care Provider Office Phone # Fax #    Dinorah Alonso [phone]       [facility] [address] [address]        Equal Access to Services     VIKTOR DAVID : Hadii aad ku hadasho Soomaali, waaxda luqadaha, qaybta kaalmada adeegyada, key tao . So [facility] [phone].    ATENCIÓN: Si habla español, tiene a phelps disposición servicios gratuitos de asistencia lingüística. Llame al [phone].    We comply with applicable federal civil rights laws and [place] laws. We do not discriminate on the basis of race, color, national origin, age, disability sex, sexual orientation or gender identity.            Thank you!     Thank you for choosing [facility]   for your care. Our goal is always to provide you with excellent care. Hearing back from our patients is one way we can continue to improve our services. Please take a few minutes to complete the written survey that you may receive in the mail after your visit with us. Thank  you!             Your Updated Medication List - Protect others around you: Learn how to safely use, store and throw away your medicines at www.disposemymeds.org.          This list is accurate as of: 8/2/17  4:28 PM.  Always use your most recent med list.                   Brand Name Dispense Instructions for use Diagnosis    albuterol 108 (90 BASE) MCG/ACT Inhaler    albuterol    2 Inhaler    Inhale 1-2 puffs into the lungs every 6 hours as needed for shortness of breath / dyspnea    Mild persistent asthma without complication       CLARITIN PO      1 TABLET DAILY        EPINEPHrine 0.3 MG/0.3ML injection 2-pack    EPIPEN/ADRENACLICK/or ANY BX GENERIC EQUIV    0.3 mL    Inject 0.3 mLs (0.3 mg) into the muscle once as needed for anaphylaxis    Allergic reaction to bee sting       fluticasone 50 MCG/ACT spray    FLONASE     Spray 2 sprays into both nostrils daily        ketoprofen 10% in PLO 10% topical gel     30 g    Apply a pea-sized amount to the CMC joint on the Right    CMC arthritis       mometasone-formoterol 200-5 MCG/ACT oral inhaler    DULERA     Inhale 2 puffs into the lungs 2 times daily        triamcinolone 0.1 % cream    KENALOG    80 g    Apply sparingly to affected area three times daily as needed for 1-2 weeks    Eczema, unspecified type       vitamin B complex with vitamin C Tabs tablet      Take 1 tablet by mouth daily

## 2017-08-08 ENCOUNTER — OFFICE VISIT (OUTPATIENT)
Dept: PSYCHOLOGY | Facility: CLINIC | Age: 52
End: 2017-08-08
Payer: COMMERCIAL

## 2017-08-08 DIAGNOSIS — F43.10 PTSD (POST-TRAUMATIC STRESS DISORDER): ICD-10-CM

## 2017-08-08 DIAGNOSIS — F41.9 ANXIETY DISORDER, UNSPECIFIED TYPE: Primary | ICD-10-CM

## 2017-08-08 ASSESSMENT — ENCOUNTER SYMPTOMS
NECK PAIN: 0
SMELL DISTURBANCE: 0
CONSTIPATION: 1
SINUS PAIN: 0
JAUNDICE: 0
NAUSEA: 0
MUSCLE WEAKNESS: 0
HOARSE VOICE: 0
MUSCLE CRAMPS: 0
BLOATING: 1
SINUS CONGESTION: 1
RECTAL BLEEDING: 0
HOT FLASHES: 1
ARTHRALGIAS: 1
TASTE DISTURBANCE: 0
WHEEZING: 0
DYSPNEA ON EXERTION: 0
DIARRHEA: 0
HEARTBURN: 0
BLOOD IN STOOL: 1
TROUBLE SWALLOWING: 0
SHORTNESS OF BREATH: 0
COUGH: 1
NECK MASS: 0
SNORES LOUDLY: 0
RESPIRATORY PAIN: 0
VOMITING: 0
RECTAL PAIN: 0
SPUTUM PRODUCTION: 0
STIFFNESS: 0
COUGH DISTURBING SLEEP: 1
SORE THROAT: 1
POSTURAL DYSPNEA: 0
HEMOPTYSIS: 0
BACK PAIN: 1
DECREASED LIBIDO: 1
JOINT SWELLING: 0
MYALGIAS: 1
ABDOMINAL PAIN: 0
BOWEL INCONTINENCE: 0

## 2017-08-08 NOTE — MR AVS SNAPSHOT
After Visit Summary   8/8/2017    Nkechi Manrique    MRN: [id number]           Patient Information     Date Of Birth          1965        Visit Information        Provider Department      8/8/2017 2:00 PM Kellie Granados, PhD  [facility]         Today's Diagnoses     Anxiety disorder, unspecified type    -  1    PTSD (post-traumatic stress disorder)           Follow-ups after your visit        Your next 10 appointments already scheduled     Aug 11, 2017  3:45 PM CDT   New Patient Visit with Maria C Walsh MD   [facility] ([facility])    [place] [facility] [address]  [address]  [address]  [facility] [address]   [phone]            Sep 11, 2017 11:00 AM CDT   Return Visit with Kellie Granados, PhD    [facility]  ([facility])    [place] [facility]  [address]  [address]  [facility] [address]   [phone]            Sep 20, 2017  8:00 AM CDT   Return Visit with Kellie Granados, PhD    [facility]  ([facility])    [place] [facility]  [address]  [address]  [facility] [address]   [phone]            Sep 27, 2017 10:00 AM CDT   Return Visit with Kellie Granados, PhD    [facility]  ([facility])    [place] [facility]  [address]  [address]  [facility] [address]   [phone]            Oct 03, 2017 11:00 AM CDT   Return Visit with Kellie Granados, PhD    [facility]  ([facility])    [place] [facility]  [address]  [address]  [facility] [address]   [phone]            Oct 10, 2017 11:00 AM CDT   Return Visit with Kellie Granados, PhD    [facility]  ([facility])    [place] [facility]  [address]  [address]  [place] [address]   [phone]            Oct 16, 2017  2:00 PM CDT   Return Visit with Kellie Granados, PhD [facility]   [facility]  ([facility])    [place] [facility]  [address]  [address]  [place] [address]   [phone]            Oct 31, 2017 10:00 AM CDT   Return Visit with Kellie Granados, PhD [facility]   [facility]  ([facility])    [facility]  [address]  [address]  [place] [address]   [phone]            Nov 07, 2017  8:00 AM CST   Return Visit with Kellie Granados, PhD    [facility]  ([facility])    [place] [facility]  [address]  [address]  [place] [address]   [phone]            Nov 14, 2017  8:00 AM CST   Return Visit with Kellie Granados, PhD    [facility]  ([facility])    [facility]  [address]  [address]  [place] [address]   [phone]              Who to contact     Please call your clinic at [phone] to:    Ask questions about your health    Make or cancel appointments    Discuss your medicines    Learn about your test results    Speak to your doctor   If you have compliments or concerns about an experience at your clinic, or if you wish to file a complaint, please contact [facility] Patient Relations at [phone] or email us at [email or web address]         Additional Information About Your Visit        Occipitalhart Information     [employer] gives you secure access to your electronic health record. If you see a primary care provider, you can also send messages to your care team and make appointments. If you have questions, please call your primary care clinic.  If you do not have a primary care provider, please call [phone] and they will assist you.      Sesamea is an electronic  gateway that provides easy, online access to your medical records. With [employer], you can request a clinic appointment, read your test results, renew a prescription or communicate with your care team.     To access your existing account, please contact your [facility] or call [phone] for assistance.        Care EveryWhere ID     This is your Care EveryWhere ID. This could be used by other organizations to access your [place] medical records  [id number]        Your Vitals Were     Last Period                   10/16/2015 (Exact Date)            Blood Pressure from Last 3 Encounters:   06/28/17 110/72   11/06/15 117/68   10/28/14 92/60    Weight from Last 3 Encounters:   06/28/17 89 kg (196 lb 4.8 oz)   11/02/15 79.4 kg (175 lb)   10/28/14 79.4 kg (175 lb)              We Performed the Following     Individual Psychotherapy - Psychotherapy with pt and/or family present  60 mins [53+ mins] ([address])        Primary Care Provider Office Phone # Fax #    Dinorah Alonso [phone] [address] [address]        Equal Access to Services     [facility] AH: Hadii aad ku hadasho Soomaali, waaxda luqadaha, qaybta kaalmada adeegyada, key doll haymamadou tao . So [facility] [phone].    ATENCIÓN: Si habla español, tiene a phelps disposición servicios gratuitos de asistencia lingüística. Llame al [phone].    We comply with applicable federal civil rights laws and [place] laws. We do not discriminate on the basis of race, color, national origin, age, disability sex, sexual orientation or gender identity.            Thank you!     Thank you for choosing [facility]   for your care. Our goal is always to provide you with excellent care. Hearing back from our patients is one way we can continue to improve our services. Please take a few minutes to complete the written survey that you may receive in the  mail after your visit with us. Thank you!             Your Updated Medication List - Protect others around you: Learn how to safely use, store and throw away your medicines at www.disposemymeds.org.          This list is accurate as of: 8/8/17  5:17 PM.  Always use your most recent med list.                   Brand Name Dispense Instructions for use Diagnosis    albuterol 108 (90 BASE) MCG/ACT Inhaler    albuterol    2 Inhaler    Inhale 1-2 puffs into the lungs every 6 hours as needed for shortness of breath / dyspnea    Mild persistent asthma without complication       CLARITIN PO      1 TABLET DAILY        EPINEPHrine 0.3 MG/0.3ML injection 2-pack    EPIPEN/ADRENACLICK/or ANY BX GENERIC EQUIV    0.3 mL    Inject 0.3 mLs (0.3 mg) into the muscle once as needed for anaphylaxis    Allergic reaction to bee sting       fluticasone 50 MCG/ACT spray    FLONASE     Spray 2 sprays into both nostrils daily        ketoprofen 10% in PLO 10% topical gel     30 g    Apply a pea-sized amount to the CMC joint on the Right    CMC arthritis       mometasone-formoterol 200-5 MCG/ACT oral inhaler    DULERA     Inhale 2 puffs into the lungs 2 times daily        triamcinolone 0.1 % cream    KENALOG    80 g    Apply sparingly to affected area three times daily as needed for 1-2 weeks    Eczema, unspecified type       vitamin B complex with vitamin C Tabs tablet      Take 1 tablet by mouth daily

## 2017-08-08 NOTE — PROGRESS NOTES
"  Name:  Nkechi Manrique  Mrn: [id number]  Date of visit: 8/8/2017    PSYCHOLOGY OUTPATIENT VISIT NOTE       PRESENTING PROBLEMS/SYMPTOMS:  \"overwhelmed.\"  Energy low.  30 lb Wt gain in past year with uncertain cause.  Sleep  good,  and awakening with energy.   High level of guilt  but difficulty describing this guilt around her  family connection.   Denied lethargy.  Difficulty around decision making and questioning self/self doubt, deferring to others.  Self esteem lower as she realized  I need to get help  and poor body image.   A lot of anxiety, daily  around work, unable to control.  Anxiety associated with Irritability, muscle tension, restlessness, GI distress.  Social fears. (boyfriend = Artemio)   INTERVENTION AND RESPONSE:  Cognitive Behavioral therapy, individual.  Pt reported on accessing [employer] podcasts, meditations helpful (e.g. [place] kindness, permission to take care of self).  Also doing yoga helpful.  Reinforced and to continue.  Discussed episode with J, sense that he is criticizing her, e.g. Avocado, communication helpful.  Also anxiety when overhearing colleagues' conversation, worry around pt escalating.  Importance of boundaries.  Exercise in session with ropes, pt holding rope around body and then draping rope on body to identify boundary.  Then using wastebasket, basket.  Discussed sense of boundary and indicators in body.  Plan for recognizing boundaries, homework.  Also continuing with yoga and meditations.    ASSESSMENT: future oriented. Triggers to trauma when overhearing nurses.  Increasing mindfulness and meditation.   PTSD sxs: traumatic experience of patient dying, sense of others \"working\" on pt and \"she's not even a person.\" Intrusive experience of distress and physical anxiety with cues (e.g. Walking by room or bay, sounds of buzzers and alarms); Avoidance of reminders, e.g. Conversations, the pt's old room.  Persistent state of anxiety, ( Belief: there is no help, you should just get " "over it); increased irritability, hypervigilance and concentration problems.)  Meets criteria for PTSD.  Continuing context:   Missing unconditional love in childhood, and mom critical.  \"always have to overachieve; don't mess up, don't have feelings, be better be more.  Being \"skinny\" because mom criticized wt.  Not acknowledged.  Jim  when she was in highschool and he did not ever get love.  Visit with mom in  and mom being nice to get support from pt, \"she was never there for me but I have to be there for her.\"  Also pattern of saying \"I don't know,\"  Although she is denying anhedonia and sadness or depression, her presentation is consistent with a depressive disorder.  Possibility that she has \"normalized\" sadness and anhedonia. To continue to monitor for depression.  Symptoms consistent with MEERA but with focus in one domain only, again may be possibilty of underreporting or lack of awareness around scope of worry.     Belief: there is no help, you should just get over it.    Mental Status Assessment:  Appearance:   Appropriate   Eye Contact:   Good   Psychomotor Behavior: Normal   Attitude:   Cooperative   Orientation:   All  Speech   Rate / Production: Normal and tendency to ask therapist if she is \"making sense.\"   Volume:  Normal   Mood:    Dysphoric, tearful (laughing)  Thought Content:  Clear   Thought Form:  Coherent  Logical   Insight:    Fair     DIAGNOSIS:  Unspecified anxiety disorder; PTSD; R/O depression  PLAN:    Patient to schedule followup visit.       Total time spent equals 55 minutes, individual psychotherapy.             "

## 2017-08-11 ENCOUNTER — OFFICE VISIT (OUTPATIENT)
Dept: OBGYN | Facility: CLINIC | Age: 52
End: 2017-08-11
Attending: OBSTETRICS & GYNECOLOGY
Payer: COMMERCIAL

## 2017-08-11 VITALS — SYSTOLIC BLOOD PRESSURE: 121 MMHG | DIASTOLIC BLOOD PRESSURE: 77 MMHG | HEART RATE: 78 BPM

## 2017-08-11 DIAGNOSIS — Z78.0 MENOPAUSE: Primary | ICD-10-CM

## 2017-08-11 RX ORDER — ESTRADIOL 0.1 MG/G
2 CREAM VAGINAL WEEKLY
Qty: 42.5 G | Refills: 3 | Status: SHIPPED | OUTPATIENT
Start: 2017-08-11 | End: 2018-10-22

## 2017-08-11 RX ORDER — ESTRADIOL 0.1 MG/G
2 CREAM VAGINAL WEEKLY
Qty: 42.5 G | Refills: 3 | Status: SHIPPED | OUTPATIENT
Start: 2017-08-11 | End: 2018-08-31

## 2017-08-11 ASSESSMENT — ANXIETY QUESTIONNAIRES: GAD7 TOTAL SCORE: INCOMPLETE

## 2017-08-11 NOTE — MR AVS SNAPSHOT
After Visit Summary   8/11/2017    Nkechi Manrique    MRN: [id number]           Patient Information     Date Of Birth          1965        Visit Information        Provider Department      8/11/2017 3:45 PM Maria C Walsh MD [facility]        Today's Diagnoses     Menopause    -  1       Follow-ups after your visit        Your next 10 appointments already scheduled     Sep 11, 2017 11:00 AM CDT   Return Visit with Kellie Granados, PhD    [facility]  ([facility])    [facility]  [address]  [facility] [address]   [phone]            Sep 20, 2017  8:00 AM CDT   Return Visit with Kellie Granados, PhD    [facility]  ([facility])    [facility]  [address]  [address]  [facility] [address]   [phone]            Sep 27, 2017 10:00 AM CDT   Return Visit with Kellie Granados, PhD    [facility]  ([facility])    [facility]  [address]  [address]  [facility] [address]   [phone]            Oct 03, 2017 11:00 AM CDT   Return Visit with Kellie Granados, PhD    [facility]  ([facility])    [facility]  [address]  [address]  [facility] [address]   [phone]            Oct 10, 2017 11:00 AM CDT   Return Visit with Kellie Granados, PhD    [facility]  ([facility])    [facility]  [address]  [address]  [facility] [address]   [phone]            Oct 16, 2017  2:00 PM CDT   Return Visit with Kellie Granados, PhD    [facility]  ([facility])    [facility]  [address]  [address]  [facility] [address]   [phone]            Oct 31, 2017 10:00 AM CDT    Return Visit with Kellie Granados, PhD SHANNON   [facility]  ([facility])    [facility]  [address]  [address]  [facility] [address] [phone]            Nov 07, 2017  8:00 AM CST   Return Visit with Kellie Granados, PhD SHANNON   [facility]  ([facility])    [facility]  [address]  [address]  [facility] [address]   [phone]            Nov 14, 2017  8:00 AM CST   Return Visit with Kellie Granados, PhD SHANNON   [facility]  ([facility])    [facility]  [address]  [address] [facility] [address] [phone]            Nov 21, 2017  1:00 PM CST   Return Visit with Kellie Granados, PhD SHANNON   [facility]  ([facility])    [facility]  [address]  [address] [facility] [id number] [phone]              Who to contact     Please call your clinic at [phone] to:    Ask questions about your health    Make or cancel appointments    Discuss your medicines    Learn about your test results    Speak to your doctor   If you have compliments or concerns about an experience at your clinic, or if you wish to file a complaint, please contact [facility] Patient Relations at [phone] or email us at [email or web address]         Additional Information About Your Visit        Verde[employer] Information     [employer] gives you secure access to your electronic health record. If you see a primary care provider, you can also send messages to your care team and make appointments. If you have questions, please call your primary care clinic.  If you do not have a primary care provider, please call [phone] and they will assist you.      [employer] is an electronic gateway that provides easy, online access to your medical records. With [employer],  you can request a clinic appointment, read your test results, renew a prescription or communicate with your care team.     To access your existing account, please contact your [facility] or call [phone] for assistance.        Care EveryWhere ID     This is your Care EveryWhere ID. This could be used by other organizations to access your [facility] medical records  [id number]        Your Vitals Were     Pulse Last Period                78 10/16/2015 (Exact Date)           Blood Pressure from Last 3 Encounters:   08/11/17 121/77   06/28/17 110/72   11/06/15 117/68    Weight from Last 3 Encounters:   06/28/17 89 kg (196 lb 4.8 oz)   11/02/15 79.4 kg (175 lb)   10/28/14 79.4 kg (175 lb)              We Performed the Following     DEXA - HIM Scan          Today's Medication Changes          These changes are accurate as of: 8/11/17 11:59 PM.  If you have any questions, ask your nurse or doctor.               Start taking these medicines.        Dose/Directions    * estradiol 0.1 MG/GM cream   Commonly known as:  ESTRACE VAGINAL   Used for:  Menopause   Started by:  Maria C Walsh MD        Dose:  2 g   Place 2 g vaginally once a week   Quantity:  42.5 g   Refills:  3       * estradiol 0.1 MG/GM cream   Commonly known as:  ESTRACE VAGINAL   Used for:  Menopause   Started by:  Maria C Walsh MD        Dose:  2 g   Place 2 g vaginally once a week   Quantity:  42.5 g   Refills:  3       * Notice:  This list has 2 medication(s) that are the same as other medications prescribed for you. Read the directions carefully, and ask your doctor or other care provider to review them with you.      Stop taking these medicines if you haven't already. Please contact your care team if you have questions.     mometasone-formoterol 200-5 MCG/ACT oral inhaler   Commonly known as:  DULERA   Stopped by:  Maria C Walsh MD                Where to get your medicines      These medications were  sent to [id number] - [place], [place] - [address]  [address], [facility] [address]     Phone:  [phone]     estradiol 0.1 MG/GM cream         These medications were sent to [id number] - [place], [place] - [address]  [address], [facility] [address]     Phone:  [phone]     estradiol 0.1 MG/GM cream                Primary Care Provider Office Phone # Fax #    Dinorah Alonso [phone] [address] [facility]  [address]  [facility] [address]        Equal Access to Services     VIKTOR DAVID : Hadii josephine joséo Soannika, waaxda luqadaha, qaybta kaalmada adeegyada, key tao . So [facility] [phone].    ATENCIÓN: Si habla español, tiene a phelps disposición servicios gratuitos de asistencia lingüística. [facility] [phone].    We comply with applicable federal civil rights laws and [place] laws. We do not discriminate on the basis of race, color, national origin, age, disability sex, sexual orientation or gender identity.            Thank you!     Thank you for choosing [facility]  for your care. Our goal is always to provide you with excellent care. Hearing back from our patients is one way we can continue to improve our services. Please take a few minutes to complete the written survey that you may receive in the mail after your visit with us. Thank you!             Your Updated Medication List - Protect others around you: Learn how to safely use, store and throw away your medicines at www.disposemymeds.org.          This list is accurate as of: 8/11/17 11:59 PM.  Always use your most recent med list.                   Brand Name Dispense Instructions for use Diagnosis    albuterol 108 (90 BASE) MCG/ACT Inhaler    albuterol    2 Inhaler    Inhale 1-2 puffs into the lungs every 6 hours as needed for shortness of breath / dyspnea     Mild persistent asthma without complication       CALCIUM & VIT D3 BONE HEALTH PO           CLARITIN PO      1 TABLET DAILY        EPINEPHrine 0.3 MG/0.3ML injection 2-pack    EPIPEN/ADRENACLICK/or ANY BX GENERIC EQUIV    0.3 mL    Inject 0.3 mLs (0.3 mg) into the muscle once as needed for anaphylaxis    Allergic reaction to bee sting       * estradiol 0.1 MG/GM cream    ESTRACE VAGINAL    42.5 g    Place 2 g vaginally once a week    Menopause       * estradiol 0.1 MG/GM cream    ESTRACE VAGINAL    42.5 g    Place 2 g vaginally once a week    Menopause       fluticasone 50 MCG/ACT spray    FLONASE     Spray 2 sprays into both nostrils daily        ketoprofen 10% in PLO 10% topical gel     30 g    Apply a pea-sized amount to the CMC joint on the Right    CMC arthritis       magnesium 100 MG Caps           progesterone 100 MG    ENDOMETRIN          triamcinolone 0.1 % cream    KENALOG    80 g    Apply sparingly to affected area three times daily as needed for 1-2 weeks    Eczema, unspecified type       vitamin B complex with vitamin C Tabs tablet      Take 1 tablet by mouth daily        * Notice:  This list has 2 medication(s) that are the same as other medications prescribed for you. Read the directions carefully, and ask your doctor or other care provider to review them with you.

## 2017-08-11 NOTE — LETTER
8/11/2017       RE: Nkechi Manrique  [address] [facility] [address]     Dear Colleague,    Thank you for referring your patient, Nkechi Manrique, to the [facility] at [facility]. Please see a copy of my visit note below.    52 yo P0 in early menopause presents for consultation for planning and expectations.   Currently on compounded progesterone 100-200 mg daily and feels significant improvement on this.   Does not suffer many daytime hotflashes but is bothered by sleep interruption, memory concerns, vaginal dryness.     History significant for Factor V Leiden (heterozygous) and thus estrogen is not recommended.     Patient Active Problem List   Diagnosis     Congenital deficiency of other clotting factors     Mild persistent asthma     CARDIOVASCULAR SCREENING; LDL GOAL LESS THAN 160     Varicose veins of legs     Breast lump     BMI 26.0-26.9,adult     Knee pain     CMC arthritis, thumb, degenerative     Thumb pain, right     Seasonal allergic rhinitis     Mechanical limb problems     Past Medical History:   Diagnosis Date     Abnormal Pap smear 1990    Biopsy- ok     Blood dyscrasia     Factor 5 Leiden HTZ     Breast lump 9/27/2013     CMC arthritis, thumb, degenerative 11/3/2015     Congenital deficiency of other clotting factors     Factor V Leiden positive, had superficial thrombus, no DVT     Congenital deficiency of other clotting factors      Mild intermittent asthma with exacerbation     seasonally related, rare albuterol     Plantar fascia syndrome     L ft in '10, R ft in '11     Seasonal allergic rhinitis     claritin spring/August     Stress fracture of lower leg ~2002, 2009     Varicosities 2015    Varicose veins removed     Past Surgical History:   Procedure Laterality Date     BIOPSY  1998 2000 2017    Skin tags moles cysts     COLONOSCOPY  2015    Polyp removed     PHLEBECTOMY MULTIPLE STAB  10/15    [place] [facility]      Obstetric History       T0      L0     SAB0   TAB0   Ectopic0   Multiple0   Live Births0         Social History     Social History     Marital status: Single     Spouse name: N/A     Number of children: N/A     Years of education: N/A     Occupational History      [employer]     Birthplace     Social History Main Topics     Smoking status: Never Smoker     Smokeless tobacco: Never Used     Alcohol use 0.6 oz/week      Comment: 2 drinks per week     Drug use: No     Sexual activity: Yes     Partners: Male     Birth control/ protection: Post-menopausal, Male Surgical     Other Topics Concern     None     Social History Narrative    Caffeine intake/servings daily - 2-3    Calcium intake/servings daily - 2-3    Exercise 6-7 times weekly - describe biking, swimming, wts    Sunscreen used - Yes    Seatbelts used - Yes    Guns stored in the home - No    Self Breast Exam - No    Pap test up to date -  Yes    Eye exam up to date -  Yes    Dental exam up to date -  Yes    DEXA scan up to date -  Not Applicable    Flex Sig/Colonoscopy up to date -  Not Applicable    Mammography up to date -  Yes    Immunizations reviewed and up to date - Yes    Abuse: Current or Past (Physical, Sexual or Emotional) - No    Do you feel safe in your environment - Yes    Do you cope well with stress - Yes    Do you suffer from insomnia - No    Rosy Lam LPN      '10                                 /77  Pulse 78  LMP 10/16/2015 (Exact Date)    Total visit time was 20 minutes with 20 minutes spent in counseling and coordination of care for menopause: folder given, risks/benefits HRT reviewed, topical E2 reviewed.  ROS listed below reviewed on 17.    Maria C Walsh    Answers for HPI/ROS submitted by the patient on 2017   General Symptoms: No  Skin Symptoms: No  HENT Symptoms: Yes  EYE SYMPTOMS: No  HEART SYMPTOMS: No  LUNG SYMPTOMS: Yes  INTESTINAL SYMPTOMS: Yes  URINARY SYMPTOMS:  No  GYNECOLOGIC SYMPTOMS: Yes  BREAST SYMPTOMS: No  SKELETAL SYMPTOMS: Yes  BLOOD SYMPTOMS: No  NERVOUS SYSTEM SYMPTOMS: No  MENTAL HEALTH SYMPTOMS: No  Ear pain: No  Ear discharge: No  Hearing loss: No  Tinnitus: No  Nosebleeds: No  Congestion: Yes  Sinus pain: No  Trouble swallowing: No   Voice hoarseness: No  Mouth sores: No  Sore throat: Yes  Tooth pain: No  Gum tenderness: No  Bleeding gums: No  Change in taste: No  Change in sense of smell: No  Dry mouth: No  Hearing aid used: No  Neck lump: No  Cough: Yes  Sputum or phlegm: No  Coughing up blood: No  Difficulty breating or shortness of breath: No  Snoring: No  Wheezing: No  Difficulty breathing on exertion: No  Respiratory pain: No  Nighttime Cough: Yes  Difficulty breathing when lying flat: No  Heart burn or indigestion: No  Nausea: No  Vomiting: No  Abdominal pain: No  Bloating: Yes  Constipation: Yes  Diarrhea: No  Blood in stool: Yes  Black stools: No  Rectal or Anal pain: No  Fecal incontinence: No  Rectal bleeding: No  Yellowing of skin or eyes: No  Vomit with blood: No  Change in stools: No  Hemorrhoids: No  Back pain: Yes  Muscle aches: Yes  Neck pain: No  Swollen joints: No  Joint pain: Yes  Bone pain: No  Muscle cramps: No  Muscle weakness: No  Joint stiffness: No  Bone fracture: No  Bleeding or spotting between periods: No  Heavy or painful periods: No  Irregular periods: No  Vaginal discharge: No  Hot flashes: Yes  Vaginal dryness: Yes  Genital ulcers: No  Reduced libido: Yes  Painful intercourse: Yes  Difficulty with sexual arousal: Yes  Post-menopausal bleeding: No  PHQ-2 Score: 0  MEERA 7 TOTAL SCORE: Incomplete      Again, thank you for allowing me to participate in the care of your patient.      Sincerely,    Maria C Walsh MD

## 2017-08-13 NOTE — PROGRESS NOTES
52 yo P0 in early menopause presents for consultation for planning and expectations.   Currently on compounded progesterone 100-200 mg daily and feels significant improvement on this.   Does not suffer many daytime hotflashes but is bothered by sleep interruption, memory concerns, vaginal dryness.     History significant for Factor V Leiden (heterozygous) and thus estrogen is not recommended.     Patient Active Problem List   Diagnosis     Congenital deficiency of other clotting factors     Mild persistent asthma     CARDIOVASCULAR SCREENING; LDL GOAL LESS THAN 160     Varicose veins of legs     Breast lump     BMI 26.0-26.9,adult     Knee pain     CMC arthritis, thumb, degenerative     Thumb pain, right     Seasonal allergic rhinitis     Mechanical limb problems     Past Medical History:   Diagnosis Date     Abnormal Pap smear     Biopsy- ok     Blood dyscrasia     Factor 5 Leiden HTZ     Breast lump 2013     CMC arthritis, thumb, degenerative 11/3/2015     Congenital deficiency of other clotting factors     Factor V Leiden positive, had superficial thrombus, no DVT     Congenital deficiency of other clotting factors      Mild intermittent asthma with exacerbation     seasonally related, rare albuterol     Plantar fascia syndrome     L ft in '10, R ft in '11     Seasonal allergic rhinitis     claritin spring/August     Stress fracture of lower leg ~,      Varicosities 2015    Varicose veins removed     Past Surgical History:   Procedure Laterality Date     BIOPSY  1998    Skin tags moles cysts     COLONOSCOPY  2015    Polyp removed     PHLEBECTOMY MULTIPLE STAB  10/15    [place] [facility], Dr. Ramirez     Obstetric History       T0      L0     SAB0   TAB0   Ectopic0   Multiple0   Live Births0         Social History     Social History     Marital status: Single     Spouse name: N/A     Number of children: N/A     Years of education: N/A     Occupational History      [employer]     Birthplace     Social History Main Topics     Smoking status: Never Smoker     Smokeless tobacco: Never Used     Alcohol use 0.6 oz/week      Comment: 2 drinks per week     Drug use: No     Sexual activity: Yes     Partners: Male     Birth control/ protection: Post-menopausal, Male Surgical     Other Topics Concern     None     Social History Narrative    Caffeine intake/servings daily - 2-3    Calcium intake/servings daily - 2-3    Exercise 6-7 times weekly - describe biking, swimming, wts    Sunscreen used - Yes    Seatbelts used - Yes    Guns stored in the home - No    Self Breast Exam - No    Pap test up to date -  Yes    Eye exam up to date -  Yes    Dental exam up to date -  Yes    DEXA scan up to date -  Not Applicable    Flex Sig/Colonoscopy up to date -  Not Applicable    Mammography up to date -  Yes    Immunizations reviewed and up to date - Yes    Abuse: Current or Past (Physical, Sexual or Emotional) - No    Do you feel safe in your environment - Yes    Do you cope well with stress - Yes    Do you suffer from insomnia - No    Rosy Lam LPN      '10                                 /77  Pulse 78  LMP 10/16/2015 (Exact Date)    Total visit time was 20 minutes with 20 minutes spent in counseling and coordination of care for menopause: folder given, risks/benefits HRT reviewed, topical E2 reviewed.  ROS listed below reviewed on 8/11/17.    Maria C Walsh    Answers for HPI/ROS submitted by the patient on 8/8/2017   General Symptoms: No  Skin Symptoms: No  HENT Symptoms: Yes  EYE SYMPTOMS: No  HEART SYMPTOMS: No  LUNG SYMPTOMS: Yes  INTESTINAL SYMPTOMS: Yes  URINARY SYMPTOMS: No  GYNECOLOGIC SYMPTOMS: Yes  BREAST SYMPTOMS: No  SKELETAL SYMPTOMS: Yes  BLOOD SYMPTOMS: No  NERVOUS SYSTEM SYMPTOMS: No  MENTAL HEALTH SYMPTOMS: No  Ear pain: No  Ear discharge: No  Hearing loss: No  Tinnitus: No  Nosebleeds: No  Congestion: Yes  Sinus pain: No  Trouble swallowing: No   Voice  hoarseness: No  Mouth sores: No  Sore throat: Yes  Tooth pain: No  Gum tenderness: No  Bleeding gums: No  Change in taste: No  Change in sense of smell: No  Dry mouth: No  Hearing aid used: No  Neck lump: No  Cough: Yes  Sputum or phlegm: No  Coughing up blood: No  Difficulty breating or shortness of breath: No  Snoring: No  Wheezing: No  Difficulty breathing on exertion: No  Respiratory pain: No  Nighttime Cough: Yes  Difficulty breathing when lying flat: No  Heart burn or indigestion: No  Nausea: No  Vomiting: No  Abdominal pain: No  Bloating: Yes  Constipation: Yes  Diarrhea: No  Blood in stool: Yes  Black stools: No  Rectal or Anal pain: No  Fecal incontinence: No  Rectal bleeding: No  Yellowing of skin or eyes: No  Vomit with blood: No  Change in stools: No  Hemorrhoids: No  Back pain: Yes  Muscle aches: Yes  Neck pain: No  Swollen joints: No  Joint pain: Yes  Bone pain: No  Muscle cramps: No  Muscle weakness: No  Joint stiffness: No  Bone fracture: No  Bleeding or spotting between periods: No  Heavy or painful periods: No  Irregular periods: No  Vaginal discharge: No  Hot flashes: Yes  Vaginal dryness: Yes  Genital ulcers: No  Reduced libido: Yes  Painful intercourse: Yes  Difficulty with sexual arousal: Yes  Post-menopausal bleeding: No  PHQ-2 Score: 0  MEERA 7 TOTAL SCORE: Incomplete

## 2017-09-20 ENCOUNTER — OFFICE VISIT (OUTPATIENT)
Dept: PSYCHOLOGY | Facility: CLINIC | Age: 52
End: 2017-09-20
Payer: COMMERCIAL

## 2017-09-20 DIAGNOSIS — F43.10 PTSD (POST-TRAUMATIC STRESS DISORDER): ICD-10-CM

## 2017-09-20 DIAGNOSIS — F41.9 ANXIETY DISORDER, UNSPECIFIED TYPE: Primary | ICD-10-CM

## 2017-09-20 NOTE — MR AVS SNAPSHOT
After Visit Summary   9/20/2017    Nkechi Manrique    MRN: [id number]           Patient Information     Date Of Birth          1965        Visit Information        Provider Department      9/20/2017 8:00 AM Kellie Granados, PhD  [facility]         Today's Diagnoses     Anxiety disorder, unspecified type    -  1    PTSD (post-traumatic stress disorder)           Follow-ups after your visit        Your next 10 appointments already scheduled     Sep 27, 2017 10:00 AM CDT   Return Visit with Kellie Granados, PhD    [facility]  ([facility])    [place] [facility]  [address]  [facility] [address]   [phone]            Oct 03, 2017 11:00 AM CDT   Return Visit with Kellie Granados, PhD SHANNON   [facility]  ([facility])    [place] [facility]  [address]  [address]  [facility] [address]   [phone]            Oct 10, 2017 11:00 AM CDT   Return Visit with Kellie Granados, PhD    [facility]  ([facility])    [place] [facility]  [address]  [address]  [facility] [address]   [phone]            Oct 16, 2017  2:00 PM CDT   Return Visit with Kellie Granados, PhD    [facility]  ([facility])    [place] [facility]  [address]  [address]  [facility] [address]   [phone]            Oct 31, 2017 10:00 AM CDT   Return Visit with Kellie Granados, PhD    [facility]  ([facility])    [place] [facility]  [address]  [address]  [facility] [address]   [phone]            Nov 07, 2017  8:00 AM CST   Return Visit with Kellie Granados, PhD    [facility]  ([facility])    [place] [facility]  [address]  [facility] [address]   [phone]            Nov 14, 2017  8:00 AM CST   Return Visit with Kellie Granados, PhD SHANNON   [facility]  ([facility])    [place] [facility]  [address]  [address]  [facility] [address]   [phone]            Nov 28, 2017  8:00 AM CST   Return Visit with Kellie Granados, PhD SHANNON   [facility]  ([facility])    [place] [facility]  [address]  [address]  [facility] [address]   [phone]            Dec 05, 2017  8:00 AM CST   Return Visit with Kellie Granados, PhD    [facility]  ([facility])    [place] [facility]  [address]  [address]  [facility] [address]   [phone]            Dec 12, 2017  8:00 AM CST   Return Visit with Kellie Granados, PhD    [facility]  ([facility])    [place] [facility]  [address]  [address]  [facility] [address]   [phone]              Who to contact     Please call your clinic at [phone] to:    Ask questions about your health    Make or cancel appointments    Discuss your medicines    Learn about your test results    Speak to your doctor   If you have compliments or concerns about an experience at your clinic, or if you wish to file a complaint, please contact [facility] Patient Relations at [phone] or email us at [email or web address]         Additional Information About Your Visit        Zubicanhart Information     Zubicanhart gives you secure access to your electronic health record. If you see a primary care provider, you can also send messages to your care team and make appointments. If you have questions, please call your primary care clinic.  If you do not have a primary care provider, please call [phone] and they will assist you.      Priztag is an electronic  gateway that provides easy, online access to your medical records. With Convio, you can request a clinic appointment, read your test results, renew a prescription or communicate with your care team.     To access your existing account, please contact your [facility] or call [phone] for assistance.        Care EveryWhere ID     This is your Care EveryWhere ID. This could be used by other organizations to access your [place] medical records  [id number]        Your Vitals Were     Last Period                   10/16/2015 (Exact Date)            Blood Pressure from Last 3 Encounters:   08/11/17 121/77   06/28/17 110/72   11/06/15 117/68    Weight from Last 3 Encounters:   06/28/17 89 kg (196 lb 4.8 oz)   11/02/15 79.4 kg (175 lb)   10/28/14 79.4 kg (175 lb)              We Performed the Following     Individual Psychotherapy - Psychotherapy with pt and/or family present  60 mins [53+ mins] (72632)        Primary Care Provider Office Phone # Fax #    Dinorah Alonso [phone] [address] [address]        Equal Access to Services     [facility] AH: Hadii aad ku hadasho Soomaali, waaxda luqadaha, qaybta kaalmada adeegyada, key doll haymamadou tao . So [facility] [phone].    ATENCIÓN: Si habla español, tiene a phelps disposición servicios gratuitos de asistencia lingüística. Llame al [phone].    We comply with applicable federal civil rights laws and [place] laws. We do not discriminate on the basis of race, color, national origin, age, disability sex, sexual orientation or gender identity.            Thank you!     Thank you for choosing [facility]   for your care. Our goal is always to provide you with excellent care. Hearing back from our patients is one way we can continue to improve our services. Please take a few minutes to complete the written survey that you may receive in the  mail after your visit with us. Thank you!             Your Updated Medication List - Protect others around you: Learn how to safely use, store and throw away your medicines at www.disposemymeds.org.          This list is accurate as of: 9/20/17 11:59 PM.  Always use your most recent med list.                   Brand Name Dispense Instructions for use Diagnosis    albuterol 108 (90 BASE) MCG/ACT Inhaler    PROAIR HFA    2 Inhaler    Inhale 1-2 puffs into the lungs every 6 hours as needed for shortness of breath / dyspnea    Mild persistent asthma without complication       CALCIUM & VIT D3 BONE HEALTH PO           CLARITIN PO      1 TABLET DAILY        EPINEPHrine 0.3 MG/0.3ML injection 2-pack    EPIPEN/ADRENACLICK/or ANY BX GENERIC EQUIV    0.3 mL    Inject 0.3 mLs (0.3 mg) into the muscle once as needed for anaphylaxis    Allergic reaction to bee sting       * estradiol 0.1 MG/GM cream    ESTRACE VAGINAL    42.5 g    Place 2 g vaginally once a week    Menopause       * estradiol 0.1 MG/GM cream    ESTRACE VAGINAL    42.5 g    Place 2 g vaginally once a week    Menopause       fluticasone 50 MCG/ACT spray    FLONASE     Spray 2 sprays into both nostrils daily        ketoprofen 10% in PLO 10% topical gel     30 g    Apply a pea-sized amount to the CMC joint on the Right    CMC arthritis       magnesium 100 MG Caps           progesterone 100 MG    ENDOMETRIN          triamcinolone 0.1 % cream    KENALOG    80 g    Apply sparingly to affected area three times daily as needed for 1-2 weeks    Eczema, unspecified type       vitamin B complex with vitamin C Tabs tablet      Take 1 tablet by mouth daily        * Notice:  This list has 2 medication(s) that are the same as other medications prescribed for you. Read the directions carefully, and ask your doctor or other care provider to review them with you.

## 2017-09-22 NOTE — PROGRESS NOTES
"  Name:  Nkechi Manrique  Mrn: [id number]  Date of visit: 9/20/2017    PSYCHOLOGY OUTPATIENT VISIT NOTE       PRESENTING PROBLEMS/SYMPTOMS:  \"overwhelmed.\"  Energy low.  30 lb Wt gain in past year with uncertain cause.  Sleep  good,  and awakening with energy.   High level of guilt  but difficulty describing this guilt around her  family connection.   Denied lethargy.  Difficulty around decision making and questioning self/self doubt, deferring to others.  Self esteem lower as she realized  I need to get help  and poor body image.   A lot of anxiety, daily  around work, unable to control.  Anxiety associated with Irritability, muscle tension, restlessness, GI distress.  Social fears. (boyfriend = Artemio)   INTERVENTION AND RESPONSE:  Cognitive Behavioral therapy, individual.  Pt reported feeling \"less stressed out\" and continuing use of yoga, running, accessing [employer] [place] kindness.  STarted WW and already some wt loss.  Fewer episodes of racing hear and self doubt. Less anxiety at work.   Communicaiton from bro and plan to meet with nephew, uncertainty and hx of distance in family.  Discussed boundary exercise previous visit, wondering how psychologist perceived her boundary.  Difficulty with emotional boundaries, e.g. Work- when others are \"crabby\" I'm doing something wrong, having to communicate angry message to co-worker.  Worry that she is \"hurting others.\"  Memory of brother getting in trouble with dad, I need to help him, sad, tearful.  Importance of processing sadness and letting it go, challenging beliefs that she is \"hurting\" others and \"doing something wrong.\"  Adult self aware that she is engaging appropriately, child self triggered and sad.    REviewed goals and identified progress, plan to continue with self care.     ASSESSMENT: future oriented. Triggers to childhood trauma: Dr \"crabby\" and telling her to communicate message to coworker.  Sense of being stuck in middle and needing to protect " "one person from the others \"anger.\"  Her anxiety and sense that she is the one doing something wrong.  PTSD sxs: traumatic experience of patient dying, sense of others \"working\" on pt and \"she's not even a person.\" Intrusive experience of distress and physical anxiety with cues (e.g. Walking by room or bay, sounds of buzzers and alarms); Avoidance of reminders, e.g. Conversations, the pt's old room.  Persistent state of anxiety, ( Belief: there is no help, you should just get over it); increased irritability, hypervigilance and concentration problems.)  Meets criteria for PTSD.  Continuing context:   Missing unconditional love in childhood, and mom critical.  \"always have to overachieve; don't mess up, don't have feelings, be better be more.  Being \"skinny\" because mom criticized wt.  Not acknowledged.  Jim  when she was in highschool and he did not ever get love.  Visit with mom in  and mom being nice to get support from pt, \"she was never there for me but I have to be there for her.\"  Also pattern of saying \"I don't know,\"  Although she is denying anhedonia and sadness or depression, her presentation is consistent with a depressive disorder.  Possibility that she has \"normalized\" sadness and anhedonia. To continue to monitor for depression.  Symptoms consistent with MEERA but with focus in one domain only, again may be possibilty of underreporting or lack of awareness around scope of worry.     Belief: there is no help, you should just get over it.    Mental Status Assessment:  Appearance:   Appropriate   Eye Contact:   Good   Psychomotor Behavior: Normal   Attitude:   Cooperative   Orientation:   All  Speech   Rate / Production: Normal and tendency to ask therapist if she is \"making sense.\"   Volume:  Normal   Mood:    Dysphoric, tearful around belief that she is hurting others  Thought Content:  Clear   Thought Form:  Coherent  Logical   Insight:    Fair     DIAGNOSIS:  Unspecified anxiety disorder; PTSD; " R/O depression  PLAN:    Patient to schedule followup visit.       Total time spent equals 55 minutes, individual psychotherapy.

## 2017-09-27 ENCOUNTER — OFFICE VISIT (OUTPATIENT)
Dept: PSYCHOLOGY | Facility: CLINIC | Age: 52
End: 2017-09-27
Payer: COMMERCIAL

## 2017-09-27 DIAGNOSIS — F43.10 PTSD (POST-TRAUMATIC STRESS DISORDER): ICD-10-CM

## 2017-09-27 DIAGNOSIS — F41.9 ANXIETY DISORDER, UNSPECIFIED TYPE: Primary | ICD-10-CM

## 2017-09-27 NOTE — MR AVS SNAPSHOT
After Visit Summary   9/27/2017    Nkechi Manrique    MRN: [id number]           Patient Information     Date Of Birth          1965        Visit Information        Provider Department      9/27/2017 10:00 AM Kellie Granados, PhD  [facility]         Today's Diagnoses     Anxiety disorder, unspecified type    -  1    PTSD (post-traumatic stress disorder)           Follow-ups after your visit        Your next 10 appointments already scheduled     Oct 16, 2017  2:00 PM CDT   Return Visit with Kellie Granados, PhD    [facility]  ([facility])    [place] [facility]  [address]  [address]  [facility] [address]   [phone]            Oct 31, 2017 10:00 AM CDT   Return Visit with Kellie Granados, PhD    [facility]  ([facility])    [place] [facility]  [address]  [address]  [facility] [address]   [phone]            Nov 07, 2017  8:00 AM CST   Return Visit with Kellie Granados, PhD    [facility]  ([facility])    [place] [facility]  [address]  [address]  [facility] [address]   [phone]            Nov 14, 2017  8:00 AM CST   Return Visit with Kellie Granados, PhD    [facility]  ([facility])    [place] [facility]  [address]  [address]  [facility] [address]   [phone]            Nov 28, 2017  8:00 AM CST   Return Visit with Kellie Granados, PhD    [facility]  ([facility])    [place] [facility]  [address]  [address]  [facility] [address]   [phone]            Dec 05, 2017  8:00 AM CST   Return Visit with Kellie Granados, PhD    [facility]  ([facility])    [place] [facility]  [address]  [address]  [facility] [address]   [phone]            Dec 12, 2017  8:00 AM CST   Return Visit with Kellie Granados, PhD SHANNON   [facility]  ([facility])    [place] [facility]  [address]  [address] [facility] [facility] [address]   [phone]            Dec 19, 2017  9:00 AM CST   Return Visit with Kellie Granados, PhD SHANNON   [facility]  ([facility])    [place] [facility]  [address]  [address] [facility] [address]   [phone]            Dec 26, 2017  8:00 AM CST   Return Visit with Kellie Granados, PhD SHANNON   [facility]  ([facility])    [place] [facility]  [address]  [address] [place] [id number] [phone]              Who to contact     Please call your clinic at [phone] to:    Ask questions about your health    Make or cancel appointments    Discuss your medicines    Learn about your test results    Speak to your doctor   If you have compliments or concerns about an experience at your clinic, or if you wish to file a complaint, please contact [facility] Patient Relations at [phone] or email us at [email or web address]         Additional Information About Your Visit        MyChart Information     Altrujat gives you secure access to your electronic health record. If you see a primary care provider, you can also send messages to your care team and make appointments. If you have questions, please call your primary care clinic.  If you do not have a primary care provider, please call [phone] and they will assist you.      Voalte is an electronic gateway that provides easy, online access to your medical records. With Voalte, you can request a clinic appointment, read your test results, renew a prescription or communicate with your care team.     To access your existing account, please  contact your [facility] or call [phone] for assistance.        Care EveryWhere ID     This is your Care EveryWhere ID. This could be used by other organizations to access your [place] medical records  [id number]        Your Vitals Were     Last Period                   10/16/2015 (Exact Date)            Blood Pressure from Last 3 Encounters:   08/11/17 121/77   06/28/17 110/72   11/06/15 117/68    Weight from Last 3 Encounters:   06/28/17 89 kg (196 lb 4.8 oz)   11/02/15 79.4 kg (175 lb)   10/28/14 79.4 kg (175 lb)              We Performed the Following     Individual Psychotherapy - Psychotherapy with pt and/or family present  60 mins [53+ mins] (82708)        Primary Care Provider Office Phone # Fax #    Dinorah Alonso [phone]       [address] [address]        Equal Access to Services     KAREN DAVID : Hadii aad ku hadasho Soomaali, waaxda luqadaha, qaybta kaalmada adeegyada, waxay idiin hayamerican dg tao . So [facility] [phone].    ATENCIÓN: Si habla español, tiene a phelps disposición servicios gratuitos de asistencia lingüística. Llame al [phone].    We comply with applicable federal civil rights laws and [place] laws. We do not discriminate on the basis of race, color, national origin, age, disability sex, sexual orientation or gender identity.            Thank you!     Thank you for choosing [facility]   for your care. Our goal is always to provide you with excellent care. Hearing back from our patients is one way we can continue to improve our services. Please take a few minutes to complete the written survey that you may receive in the mail after your visit with us. Thank you!             Your Updated Medication List - Protect others around you: Learn how to safely use, store and throw away your medicines at www.disposemymeds.org.          This list is accurate as of: 9/27/17  11:59 PM.  Always use your most recent med list.                   Brand Name Dispense Instructions for use Diagnosis    albuterol 108 (90 BASE) MCG/ACT Inhaler    PROAIR HFA    2 Inhaler    Inhale 1-2 puffs into the lungs every 6 hours as needed for shortness of breath / dyspnea    Mild persistent asthma without complication       CALCIUM & VIT D3 BONE HEALTH PO           CLARITIN PO      1 TABLET DAILY        EPINEPHrine 0.3 MG/0.3ML injection 2-pack    EPIPEN/ADRENACLICK/or ANY BX GENERIC EQUIV    0.3 mL    Inject 0.3 mLs (0.3 mg) into the muscle once as needed for anaphylaxis    Allergic reaction to bee sting       * estradiol 0.1 MG/GM cream    ESTRACE VAGINAL    42.5 g    Place 2 g vaginally once a week    Menopause       * estradiol 0.1 MG/GM cream    ESTRACE VAGINAL    42.5 g    Place 2 g vaginally once a week    Menopause       fluticasone 50 MCG/ACT spray    FLONASE     Spray 2 sprays into both nostrils daily        ketoprofen 10% in PLO 10% topical gel     30 g    Apply a pea-sized amount to the CMC joint on the Right    CMC arthritis       magnesium 100 MG Caps           progesterone 100 MG    ENDOMETRIN          triamcinolone 0.1 % cream    KENALOG    80 g    Apply sparingly to affected area three times daily as needed for 1-2 weeks    Eczema, unspecified type       vitamin B complex with vitamin C Tabs tablet      Take 1 tablet by mouth daily        * Notice:  This list has 2 medication(s) that are the same as other medications prescribed for you. Read the directions carefully, and ask your doctor or other care provider to review them with you.

## 2017-09-28 NOTE — PROGRESS NOTES
"  Name:  Nkechi Manrique  Mrn: [id number]  Date of visit: 9/27/2017    PSYCHOLOGY OUTPATIENT VISIT NOTE       PRESENTING PROBLEMS/SYMPTOMS:  \"overwhelmed.\"  Energy low.  30 lb Wt gain in past year with uncertain cause.  Sleep  good,  and awakening with energy.   High level of guilt  but difficulty describing this guilt around her  family connection.   Denied lethargy.  Difficulty around decision making and questioning self/self doubt, deferring to others.  Self esteem lower as she realized  I need to get help  and poor body image.   A lot of anxiety, daily  around work, unable to control.  Anxiety associated with Irritability, muscle tension, restlessness, GI distress.  Social fears. (boyfriend = Artemio)   INTERVENTION AND RESPONSE:  Cognitive Behavioral therapy, individual.  Pt reported on vca with J, half marathon, positive and less anxious.  Some anticipatory anxiety re return to work tomorrow.  Discussed and helpful tools to manage anxiety.  Also reported difficulty coping with previous visit, memories of childhood elicited and thinking of this for remainder of day.  Discussed coping strategies: \"Don't think about it; I'm an adult now, safe.\"  Also importance of behavioral coping, using meditations, grounding (standing exercise).    (previous visit: boundary exercise previous visit, wondering how psychologist perceived her boundary.  Difficulty with emotional boundaries, e.g. Work- when others are \"crabby\" I'm doing something wrong, having to communicate angry message to co-worker.  Worry that she is \"hurting others.\"  Memory of brother getting in trouble with dad, I need to help him, sad, tearful.  Importance of processing sadness and letting it go, challenging beliefs that she is \"hurting\" others and \"doing something wrong.\"  Adult self aware that she is engaging appropriately, child self triggered and sad.      ASSESSMENT: future oriented. Reporting difficulty copign with childhood memories and when discussed " "today she avoided them.  Thus, focus of visit on coping.  Very much focused on \"pleasing\" therapist and external checking in, \"right?\" vs. Internal locus. PTSD sxs: traumatic experience of patient dying, sense of others \"working\" on pt and \"she's not even a person.\" Intrusive experience of distress and physical anxiety with cues (e.g. Walking by room or bay, sounds of buzzers and alarms); Avoidance of reminders, e.g. Conversations, the pt's old room.  Persistent state of anxiety, ( Belief: there is no help, you should just get over it); increased irritability, hypervigilance and concentration problems.)  Meets criteria for PTSD.  Continuing context:   Missing unconditional love in childhood, and mom critical.  \"always have to overachieve; don't mess up, don't have feelings, be better be more.  Being \"skinny\" because mom criticized wt.  Not acknowledged.  Jim  when she was in highschool and he did not ever get love.  Visit with mom in  and mom being nice to get support from pt, \"she was never there for me but I have to be there for her.\"  Also pattern of saying \"I don't know,\"  Although she is denying anhedonia and sadness or depression, her presentation is consistent with a depressive disorder.  Possibility that she has \"normalized\" sadness and anhedonia. To continue to monitor for depression.  Symptoms consistent with MEERA but with focus in one domain only, again may be possibilty of underreporting or lack of awareness around scope of worry.     Belief: there is no help, you should just get over it.    Mental Status Assessment:  Appearance:   Appropriate   Eye Contact:   Good   Psychomotor Behavior: Normal   Attitude:   Cooperative   Orientation:   All  Speech   Rate / Production: Normal and tendency to ask therapist if she is \"making sense\" or \"right\"   Volume:  Normal   Mood:    Range and slightly tearful around childhood  Thought Content:  Clear   Thought Form:  Coherent  Logical   Insight:    Fair "     DIAGNOSIS:  Unspecified anxiety disorder; PTSD; R/O depression  PLAN:    Patient to schedule followup visit.       Total time spent equals 55 minutes, individual psychotherapy.

## 2017-10-10 DIAGNOSIS — Z78.0 MENOPAUSE: Primary | ICD-10-CM

## 2017-10-10 NOTE — TELEPHONE ENCOUNTER
Received refill request for progesterone. Dr. Walsh signed RX in clinic and nurse faxed to [facility] for over a year of refills.

## 2017-10-16 ENCOUNTER — OFFICE VISIT (OUTPATIENT)
Dept: PSYCHOLOGY | Facility: CLINIC | Age: 52
End: 2017-10-16
Payer: COMMERCIAL

## 2017-10-16 DIAGNOSIS — F41.9 ANXIETY DISORDER, UNSPECIFIED TYPE: Primary | ICD-10-CM

## 2017-10-16 NOTE — PROGRESS NOTES
"  Name:  Nkechi Manrique  Mrn: [id number]  Date of visit: 10/16/2017    PSYCHOLOGY OUTPATIENT VISIT NOTE       PRESENTING PROBLEMS/SYMPTOMS:  \"overwhelmed.\"  Energy low.  30 lb Wt gain in past year with uncertain cause.  Sleep  good,  and awakening with energy.   High level of guilt  but difficulty describing this guilt around her  family connection.   Denied lethargy.  Difficulty around decision making and questioning self/self doubt, deferring to others.  Self esteem lower as she realized  I need to get help  and poor body image.   A lot of anxiety, daily  around work, unable to control.  Anxiety associated with Irritability, muscle tension, restlessness, GI distress.  Social fears. (boyfriend = Artemio)   INTERVENTION AND RESPONSE:  Cognitive Behavioral therapy, individual.  Pt reported on continuing loving kindness meditation, yoga and running, helpful.  Met with nephew twice, asking J for reassurance that nephew \"likes me.\"  \"J is my crutch.\"  Asking \"right?\"  Checking in to make sure she is pleasing others.  Practiced mindfulness in session, Hands folded and then  them, \"I need to be more open.\"  Tense jaw.  Probe: you need to be more open, then \"I want to get away...keep secret.\"  Probe: don't talk about it.  Feels familiar, normal tension in jaw, more relaxed elsewhere, increased heart rate lessening.  Learning this before 3yo, \"I've always know it.\"  Tears.  Importance of working with this directive and with allowing answers to unfold without forcing.  Tension between \"be more open\" and \"don't talk about it.\"    HW: recognizing when these 2 patterns emerge.    (previous visit: boundary exercise previous visit, wondering how psychologist perceived her boundary.  Difficulty with emotional boundaries, e.g. Work- when others are \"crabby\" I'm doing something wrong, having to communicate angry message to co-worker.  Worry that she is \"hurting others.\"  Memory of brother getting in trouble with dad, I need " "to help him, sad, tearful.  Importance of processing sadness and letting it go, challenging beliefs that she is \"hurting\" others and \"doing something wrong.\"  Adult self aware that she is engaging appropriately, child self triggered and sad.      ASSESSMENT: future oriented. Progress with communication with family and self care. Continuing to question herself and put forward question to therapist.  Open to therapist returning question to her, encouraging self knowledge.  Great avoidance when tears emerged around memory of \"don't talk about it.\"   Noting less anxiety and triggering in workplace re PTSD.  [PTSD sxs: traumatic experience of patient dying, sense of others \"working\" on pt and \"she's not even a person.\" Intrusive experience of distress and physical anxiety with cues (e.g. Walking by room or bay, sounds of buzzers and alarms); Avoidance of reminders, e.g. Conversations, the pt's old room.  Persistent state of anxiety: increased irritability, hypervigilance and concentration problems.]  Meets criteria for PTSD.  Continuing context:   Missing unconditional love in childhood, and mom critical.  \"always have to overachieve; don't mess up, don't have feelings, be better be more.  Being \"skinny\" because mom criticized wt.  Not acknowledged.  Jim  when she was in highschool and he did not ever get love.  Visit with mom in  and mom being nice to get support from pt, \"she was never there for me but I have to be there for her.\"  Also pattern of saying \"I don't know,\"  Although she is denying anhedonia and sadness or depression, her presentation is consistent with a depressive disorder.  Possibility that she has \"normalized\" sadness and anhedonia. To continue to monitor for depression.  Symptoms consistent with MEERA but with focus in one domain only, again may be possibilty of underreporting or lack of awareness around scope of worry.    Belief: there is no help, you should just get over it.    Mental Status " "Assessment:  Appearance:   Appropriate   Eye Contact:   Good   Psychomotor Behavior: Normal   Attitude:   Cooperative   Orientation:   All  Speech   Rate / Production: Normal and tendency to ask therapist if she is \"making sense\" or \"right\"   Volume:  Normal   Mood:    Range and slightly tearful around childhood  Thought Content:  Clear   Thought Form:  Coherent  Logical   Insight:    Fair     DIAGNOSIS:  Unspecified anxiety disorder; R/O depression  Not addressed at today's visit: PTSD  PLAN:    Patient to schedule followup visit.       Total time spent equals 55 minutes, individual psychotherapy.             "

## 2017-10-16 NOTE — MR AVS SNAPSHOT
After Visit Summary   10/16/2017    Nkechi Manrique    MRN: [id number]           Patient Information     Date Of Birth          1965        Visit Information        Provider Department      10/16/2017 2:00 PM Kellie Granados, PhD  [facility]         Today's Diagnoses     Anxiety disorder, unspecified type    -  1       Follow-ups after your visit        Your next 10 appointments already scheduled     Oct 31, 2017 10:00 AM CDT   Return Visit with Kellie Granados, PhD    [facility]  ([facility])    [facility]  [address]  [facility] [address]   [phone]            Nov 07, 2017  8:00 AM CST   Return Visit with Kellie Granados, PhD    [facility]  ([facility])    [facility]  [address]  [address]  [facility] [address]   [phone]            Nov 28, 2017  8:00 AM CST   Return Visit with Kellie Granados, PhD    [facility]  ([facility])    [facility]  [address]  [address]  [facility] [address]   [phone]            Dec 05, 2017  8:00 AM CST   Return Visit with Kellie Granados, PhD    [facility]  ([facility])    [facility]  [address]  [address]  [facility] [address]   [phone]            Dec 12, 2017  8:00 AM CST   Return Visit with Kellie Granados, PhD    [facility]  ([facility])    [facility]  [address]  [address]  [facility] [address]   [phone]            Dec 19, 2017  9:00 AM CST   Return Visit with Kellie Granados, PhD    [facility]  ([facility])    [facility]  [address]  [address]  [facility] [address]   [phone]               Who to contact     Please call your clinic at [phone] to:    Ask questions about your health    Make or cancel appointments    Discuss your medicines    Learn about your test results    Speak to your doctor   If you have compliments or concerns about an experience at your clinic, or if you wish to file a complaint, please contact [facility] Patient Relations at [phone] or email us at [email or web address]         Additional Information About Your Visit        [employer] Information     [employer] gives you secure access to your electronic health record. If you see a primary care provider, you can also send messages to your care team and make appointments. If you have questions, please call your primary care clinic.  If you do not have a primary care provider, please call [phone] and they will assist you.      [employer] is an electronic gateway that provides easy, online access to your medical records. With [employer], you can request a clinic appointment, read your test results, renew a prescription or communicate with your care team.     To access your existing account, please contact your [facility] or call [phone] for assistance.        Care EveryWhere ID     This is your Care EveryWhere ID. This could be used by other organizations to access your [facility] medical records  [id number]        Your Vitals Were     Last Period                   10/16/2015 (Exact Date)            Blood Pressure from Last 3 Encounters:   08/11/17 121/77   06/28/17 110/72   11/06/15 117/68    Weight from Last 3 Encounters:   06/28/17 89 kg (196 lb 4.8 oz)   11/02/15 79.4 kg (175 lb)   10/28/14 79.4 kg (175 lb)              We Performed the Following     Individual Psychotherapy - Psychotherapy with pt and/or family present  60 mins [53+ mins] (66580)        Primary Care Provider Office Phone # Fax #    Dinorah Alonso [phone]        [address] [facility]  [address]  [facility] [address]        Equal Access to Services     LEVIKTOR JOANN : Hadii josephine rosa misha Copeland, waluzda lujohnchristianha, qayvanta kageorgejose conteaustinjose, key doll akineldon parsonomayrapatricia hinson. So [facility] [phone].    ATENCIÓN: Si habla español, tiene a phelps disposición servicios gratuitos de asistencia lingüística. Ll[facility] [phone].    We comply with applicable federal civil rights laws and [place] laws. We do not discriminate on the basis of race, color, national origin, age, disability, sex, sexual orientation, or gender identity.            Thank you!     Thank you for choosing [facility]   for your care. Our goal is always to provide you with excellent care. Hearing back from our patients is one way we can continue to improve our services. Please take a few minutes to complete the written survey that you may receive in the mail after your visit with us. Thank you!             Your Updated Medication List - Protect others around you: Learn how to safely use, store and throw away your medicines at www.disposemymeds.org.          This list is accurate as of: 10/16/17  4:31 PM.  Always use your most recent med list.                   Brand Name Dispense Instructions for use Diagnosis    albuterol 108 (90 BASE) MCG/ACT Inhaler    PROAIR HFA    2 Inhaler    Inhale 1-2 puffs into the lungs every 6 hours as needed for shortness of breath / dyspnea    Mild persistent asthma without complication       CALCIUM & VIT D3 BONE HEALTH PO           CLARITIN PO      1 TABLET DAILY        EPINEPHrine 0.3 MG/0.3ML injection 2-pack    EPIPEN/ADRENACLICK/or ANY BX GENERIC EQUIV    0.3 mL    Inject 0.3 mLs (0.3 mg) into the muscle once as needed for anaphylaxis    Allergic reaction to bee sting       * estradiol 0.1 MG/GM cream    ESTRACE VAGINAL    42.5 g    Place 2 g vaginally once a week    Menopause       * estradiol 0.1 MG/GM cream    ESTRACE VAGINAL    42.5 g    Place 2  g vaginally once a week    Menopause       fluticasone 50 MCG/ACT spray    FLONASE     Spray 2 sprays into both nostrils daily        ketoprofen 10% in PLO 10% topical gel     30 g    Apply a pea-sized amount to the CMC joint on the Right    CMC arthritis       magnesium 100 MG Caps           progesterone 100 MG    ENDOMETRIN    90 each    Take 100mg progesterone by mouth at bedtime    Menopause       triamcinolone 0.1 % cream    KENALOG    80 g    Apply sparingly to affected area three times daily as needed for 1-2 weeks    Eczema, unspecified type       vitamin B complex with vitamin C Tabs tablet      Take 1 tablet by mouth daily        * Notice:  This list has 2 medication(s) that are the same as other medications prescribed for you. Read the directions carefully, and ask your doctor or other care provider to review them with you.

## 2017-10-31 ENCOUNTER — OFFICE VISIT (OUTPATIENT)
Dept: PSYCHOLOGY | Facility: CLINIC | Age: 52
End: 2017-10-31
Payer: COMMERCIAL

## 2017-10-31 DIAGNOSIS — F41.8 OTHER SPECIFIED ANXIETY DISORDERS: Primary | ICD-10-CM
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
## 2017-10-31 NOTE — MR AVS SNAPSHOT
After Visit Summary   10/31/2017    Nkechi Manrique    MRN: [id number]           Patient Information     Date Of Birth          1965        Visit Information        Provider Department      10/31/2017 10:00 AM Kellie Granados, PhD  [facility]         Today's Diagnoses     Other specified anxiety disorders    -  1       Follow-ups after your visit        Your next 10 appointments already scheduled     Nov 28, 2017  8:00 AM CST   Return Visit with Kellie Granados PhD SHANNON   [facility]  ([facility])    [place] [facility]  [address]  [facility] [id number] [phone]            Dec 05, 2017  8:00 AM CST   Return Visit with Kellie Granados, PhD SHANNON   [facility]  ([facility])    [place] [facility] [employer]  [address]  [address]  [facility] [id number] [phone]            Dec 12, 2017  8:00 AM CST   Return Visit with Kellie Granados, PhD    [facility]  ([facility])    [place] [facility] [employer]  [address]  [address]  [facility] [id number] [phone]            Dec 19, 2017  9:00 AM CST   Return Visit with Kellie Granados, PhD    [facility]  ([facility])    [place] [facility] [employer]  [address]  [address]  [facility] [id number] [phone]              Who to contact     Please call your clinic at [phone] to:    Ask questions about your health    Make or cancel appointments    Discuss your medicines    Learn about your test results    Speak to your doctor   If you have compliments or concerns about an experience at your clinic, or if you wish to file a complaint, please contact [facility] Patient Relations at [phone] or email us at [email or web address]         Additional Information About  Your Visit        [employer] Mobile[employer] Information     [employer] gives you secure access to your electronic health record. If you see a primary care provider, you can also send messages to your care team and make appointments. If you have questions, please call your primary care clinic.  If you do not have a primary care provider, please call [phone] and they will assist you.      Visitar is an electronic gateway that provides easy, online access to your medical records. With [employer]itar, you can request a clinic appointment, read your test results, renew a prescription or communicate with your care team.     To access your existing account, please contact your [facility] or call [phone] for assistance.        Care EveryWhere ID     This is your Care EveryWhere ID. This could be used by other organizations to access your [place] medical records  [id number]        Your Vitals Were     Last Period                   10/16/2015 (Exact Date)            Blood Pressure from Last 3 Encounters:   08/11/17 121/77   06/28/17 110/72   11/06/15 117/68    Weight from Last 3 Encounters:   06/28/17 89 kg (196 lb 4.8 oz)   11/02/15 79.4 kg (175 lb)   10/28/14 79.4 kg (175 lb)              We Performed the Following     Individual Psychotherapy - Psychotherapy with pt and/or family present  60 mins [53+ mins] (08969)        Primary Care Provider Office Phone # Fax #    Dinorah Alonso [phone] [address] [address]        Equal Access to Services     KAREN DAVID AH: Hadii aad ku hadasho Soomaali, waaxda luqadaha, qaybta kaalmada adeegyada, waxnicky trevonin hayamerican dg tao . So [facility] [phone].    ATENCIÓN: Si habla español, tiene a phelps disposición servicios gratuitos de asistencia lingüística. Llame al [phone].    We comply with applicable federal civil rights laws and [place] laws. We do not discriminate on the basis of race, color,  national origin, age, disability, sex, sexual orientation, or gender identity.            Thank you!     Thank you for choosing [facility]   for your care. Our goal is always to provide you with excellent care. Hearing back from our patients is one way we can continue to improve our services. Please take a few minutes to complete the written survey that you may receive in the mail after your visit with us. Thank you!             Your Updated Medication List - Protect others around you: Learn how to safely use, store and throw away your medicines at www.disposemymeds.org.          This list is accurate as of: 10/31/17  4:04 PM.  Always use your most recent med list.                   Brand Name Dispense Instructions for use Diagnosis    albuterol 108 (90 BASE) MCG/ACT Inhaler    PROAIR HFA    2 Inhaler    Inhale 1-2 puffs into the lungs every 6 hours as needed for shortness of breath / dyspnea    Mild persistent asthma without complication       CALCIUM & VIT D3 BONE HEALTH PO           CLARITIN PO      1 TABLET DAILY        EPINEPHrine 0.3 MG/0.3ML injection 2-pack    EPIPEN/ADRENACLICK/or ANY BX GENERIC EQUIV    0.3 mL    Inject 0.3 mLs (0.3 mg) into the muscle once as needed for anaphylaxis    Allergic reaction to bee sting       * estradiol 0.1 MG/GM cream    ESTRACE VAGINAL    42.5 g    Place 2 g vaginally once a week    Menopause       * estradiol 0.1 MG/GM cream    ESTRACE VAGINAL    42.5 g    Place 2 g vaginally once a week    Menopause       fluticasone 50 MCG/ACT spray    FLONASE     Spray 2 sprays into both nostrils daily        ketoprofen 10% in PLO 10% topical gel     30 g    Apply a pea-sized amount to the CMC joint on the Right    CMC arthritis       magnesium 100 MG Caps           progesterone 100 MG    ENDOMETRIN    90 each    Take 100mg progesterone by mouth at bedtime    Menopause       triamcinolone 0.1 % cream    KENALOG    80 g    Apply sparingly to affected area three  times daily as needed for 1-2 weeks    Eczema, unspecified type       vitamin B complex with vitamin C Tabs tablet      Take 1 tablet by mouth daily        * Notice:  This list has 2 medication(s) that are the same as other medications prescribed for you. Read the directions carefully, and ask your doctor or other care provider to review them with you.

## 2017-11-10 ENCOUNTER — OFFICE VISIT (OUTPATIENT)
Dept: PSYCHOLOGY | Facility: CLINIC | Age: 52
End: 2017-11-10
Payer: COMMERCIAL

## 2017-11-10 DIAGNOSIS — F41.9 ANXIETY DISORDER, UNSPECIFIED TYPE: Primary | ICD-10-CM

## 2017-11-10 NOTE — MR AVS SNAPSHOT
After Visit Summary   11/10/2017    Nkechi Manrique    MRN: [id number]           Patient Information     Date Of Birth          1965        Visit Information        Provider Department      11/10/2017 12:00 PM Kellie Granados, PhD  [facility]         Today's Diagnoses     Anxiety disorder, unspecified type    -  1       Follow-ups after your visit        Your next 10 appointments already scheduled     Nov 28, 2017  8:00 AM CST   Return Visit with Kellie Granados, PhD    [facility]  ([facility])    [place] [facility]  [address]  [address]  [facility] [address]   [phone]            Dec 05, 2017  8:00 AM CST   Return Visit with Kellie Granados, PhD    [facility]  ([facility])    [place] [facility]  [address]  [address]  [facility] [address]   [phone]            Dec 12, 2017  8:00 AM CST   Return Visit with Kellie Granados, PhD    [facility]  ([facility])    [place] [facility]  [address]  [address]  [facility] [address]   [phone]            Dec 19, 2017  9:00 AM CST   Return Visit with Kellie Granados, PhD    [facility]  ([facility])    [place] [facility]  [address]  [address]  [facility] [address]   [phone]            Jan 08, 2018 11:00 AM CST   Return Visit with Kellie Granados, PhD    [facility]  ([facility])    [place] [facility]  [address]  [address]  [facility] [address]   [phone]            Jan 16, 2018  2:00 PM CST   Return Visit with Kellie Granados, PhD    [facility]  ([facility])    [place] [facility]  [address]  [address]  [facility] [address]   [phone]             Jan 31, 2018  2:00 PM CST   Return Visit with Kellie Granados, PhD LP   [facility]  ([facility])    [facility]  3rd [facility] [address]  [address] [id number] [phone]              Who to contact     Please call your clinic at [phone] to:    Ask questions about your health    Make or cancel appointments    Discuss your medicines    Learn about your test results    Speak to your doctor   If you have compliments or concerns about an experience at your clinic, or if you wish to file a complaint, please contact [facility] Patient Relations at [phone] or email us at [email or web address]         Additional Information About Your Visit        [employer]Get[employer]ik Information     [employer] gives you secure access to your electronic health record. If you see a primary care provider, you can also send messages to your care team and make appointments. If you have questions, please call your primary care clinic.  If you do not have a primary care provider, please call [phone] and they will assist you.      [employer] is an electronic gateway that provides easy, online access to your medical records. With [employer], you can request a clinic appointment, read your test results, renew a prescription or communicate with your care team.     To access your existing account, please contact your [facility] or call [phone] for assistance.        Care EveryWhere ID     This is your Care EveryWhere ID. This could be used by other organizations to access your [place] medical records  [id number]        Your Vitals Were     Last Period                   10/16/2015 (Exact Date)            Blood Pressure from Last 3 Encounters:   08/11/17 121/77   06/28/17 110/72   11/06/15 117/68    Weight from Last 3 Encounters:   06/28/17 89 kg (196 lb 4.8 oz)   11/02/15 79.4 kg (175 lb)   10/28/14 79.4 kg (175  doron)              We Performed the Following     Individual Psychotherapy - Psychotherapy with pt and/or family present  60 mins [53+ mins] (89823)        Primary Care Provider Office Phone # Fax #    Dinorah Alonso [phone] [address] [address]        Equal Access to Services     VIKTOR JOANN : Hadii aad ku hadasho Soomaali, waaxda luqadaha, qaybta kaalmada adeegyada, waxay idiin hayaan adeeg kharash la'aan . So [facility] [phone].    ATENCIÓN: Si habla español, tiene a phelps disposición servicios gratuitos de asistencia lingüística. Llame al [phone].    We comply with applicable federal civil rights laws and [place] laws. We do not discriminate on the basis of race, color, national origin, age, disability, sex, sexual orientation, or gender identity.            Thank you!     Thank you for choosing [facility]   for your care. Our goal is always to provide you with excellent care. Hearing back from our patients is one way we can continue to improve our services. Please take a few minutes to complete the written survey that you may receive in the mail after your visit with us. Thank you!             Your Updated Medication List - Protect others around you: Learn how to safely use, store and throw away your medicines at www.disposemymeds.org.          This list is accurate as of: 11/10/17  3:22 PM.  Always use your most recent med list.                   Brand Name Dispense Instructions for use Diagnosis    albuterol 108 (90 BASE) MCG/ACT Inhaler    PROAIR HFA    2 Inhaler    Inhale 1-2 puffs into the lungs every 6 hours as needed for shortness of breath / dyspnea    Mild persistent asthma without complication       CALCIUM & VIT D3 BONE HEALTH PO           CLARITIN PO      1 TABLET DAILY        EPINEPHrine 0.3 MG/0.3ML injection 2-pack    EPIPEN/ADRENACLICK/or ANY BX GENERIC EQUIV    0.3 mL    Inject 0.3 mLs (0.3 mg) into the  muscle once as needed for anaphylaxis    Allergic reaction to bee sting       * estradiol 0.1 MG/GM cream    ESTRACE VAGINAL    42.5 g    Place 2 g vaginally once a week    Menopause       * estradiol 0.1 MG/GM cream    ESTRACE VAGINAL    42.5 g    Place 2 g vaginally once a week    Menopause       fluticasone 50 MCG/ACT spray    FLONASE     Spray 2 sprays into both nostrils daily        ketoprofen 10% in PLO 10% topical gel     30 g    Apply a pea-sized amount to the CMC joint on the Right    CMC arthritis       magnesium 100 MG Caps           progesterone 100 MG    ENDOMETRIN    90 each    Take 100mg progesterone by mouth at bedtime    Menopause       triamcinolone 0.1 % cream    KENALOG    80 g    Apply sparingly to affected area three times daily as needed for 1-2 weeks    Eczema, unspecified type       vitamin B complex with vitamin C Tabs tablet      Take 1 tablet by mouth daily        * Notice:  This list has 2 medication(s) that are the same as other medications prescribed for you. Read the directions carefully, and ask your doctor or other care provider to review them with you.
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
## 2017-11-28 ENCOUNTER — OFFICE VISIT (OUTPATIENT)
Dept: PSYCHOLOGY | Facility: CLINIC | Age: 52
End: 2017-11-28
Payer: COMMERCIAL

## 2017-11-28 DIAGNOSIS — F41.9 ANXIETY DISORDER, UNSPECIFIED TYPE: Primary | ICD-10-CM
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
## 2017-11-28 NOTE — MR AVS SNAPSHOT
After Visit Summary   11/28/2017    Nkechi Manrique    MRN: [id number]           Patient Information     Date Of Birth          1965        Visit Information        Provider Department      11/28/2017 8:00 AM Kellie Granados, PhD  [facility]         Today's Diagnoses     Anxiety disorder, unspecified type    -  1       Follow-ups after your visit        Your next 10 appointments already scheduled     Dec 05, 2017  8:00 AM CST   Return Visit with Kellie Granados, PhD    [facility]  ([facility])    [place] [facility]  [address]  [facility] [address]   [phone]            Dec 12, 2017  8:00 AM CST   Return Visit with Kellie Granados, PhD    [facility]  ([facility])    [place] [facility]  [address]  [address]  [facility] [address]   [phone]            Dec 19, 2017  9:00 AM CST   Return Visit with Kellie Granados, PhD    [facility]  ([facility])    [place] [facility]  [address]  [address]  [facility] [address]   [phone]            Jan 08, 2018 11:00 AM CST   Return Visit with Kellie Granados, PhD    [facility]  ([facility])    [place] [facility]  [address]  [address]  [facility] [address]   [phone]            Jan 16, 2018  2:00 PM CST   Return Visit with Kellie Granados, PhD    [facility]  ([facility])    [place] [facility]  [address]  [address]  [facility] [address]   [phone]            Jan 31, 2018  2:00 PM CST   Return Visit with Kellie Granados, PhD    [facility]  ([facility])    [place] [facility]  [address]  [address]  [facility] [address]   [phone]               Who to contact     Please call your clinic at [phone] to:    Ask questions about your health    Make or cancel appointments    Discuss your medicines    Learn about your test results    Speak to your doctor   If you have compliments or concerns about an experience at your clinic, or if you wish to file a complaint, please contact [facility] Patient Relations at [phone] or email us at [email or web address]         Additional Information About Your Visit        Klusterhart Information     Meicant gives you secure access to your electronic health record. If you see a primary care provider, you can also send messages to your care team and make appointments. If you have questions, please call your primary care clinic.  If you do not have a primary care provider, please call [phone] and they will assist you.      [employer] is an electronic gateway that provides easy, online access to your medical records. With [employer], you can request a clinic appointment, read your test results, renew a prescription or communicate with your care team.     To access your existing account, please contact your [facility] or call [phone] for assistance.        Care EveryWhere ID     This is your Care EveryWhere ID. This could be used by other organizations to access your [place] medical records  [id number]        Your Vitals Were     Last Period                   10/16/2015 (Exact Date)            Blood Pressure from Last 3 Encounters:   08/11/17 121/77   06/28/17 110/72   11/06/15 117/68    Weight from Last 3 Encounters:   06/28/17 89 kg (196 lb 4.8 oz)   11/02/15 79.4 kg (175 lb)   10/28/14 79.4 kg (175 lb)              We Performed the Following     Individual Psychotherapy - Psychotherapy with pt and/or family present  60 mins [53+ mins] (27150)        Primary Care Provider Office Phone # Fax #    Dinorah Alonso [phone]        [address] [facility]  [address]  [facility] [address]        Equal Access to Services     LEVIKTOR JOANN : Hadii josephine rosa misha Copeland, waluzda matthewchristianha, qayvanta kageorgejose conteaustinjose, key doll akineldon parsonomayrapatricia hinson. So [facility] [phone].    ATENCIÓN: Si habla español, tiene a phelps disposición servicios gratuitos de asistencia lingüística. [facility] [phone].    We comply with applicable federal civil rights laws and [place] laws. We do not discriminate on the basis of race, color, national origin, age, disability, sex, sexual orientation, or gender identity.            Thank you!     Thank you for choosing [facility]   for your care. Our goal is always to provide you with excellent care. Hearing back from our patients is one way we can continue to improve our services. Please take a few minutes to complete the written survey that you may receive in the mail after your visit with us. Thank you!             Your Updated Medication List - Protect others around you: Learn how to safely use, store and throw away your medicines at www.disposemymeds.org.          This list is accurate as of: 11/28/17  5:04 PM.  Always use your most recent med list.                   Brand Name Dispense Instructions for use Diagnosis    albuterol 108 (90 BASE) MCG/ACT Inhaler    PROAIR HFA    2 Inhaler    Inhale 1-2 puffs into the lungs every 6 hours as needed for shortness of breath / dyspnea    Mild persistent asthma without complication       CALCIUM & VIT D3 BONE HEALTH PO           CLARITIN PO      1 TABLET DAILY        EPINEPHrine 0.3 MG/0.3ML injection 2-pack    EPIPEN/ADRENACLICK/or ANY BX GENERIC EQUIV    0.3 mL    Inject 0.3 mLs (0.3 mg) into the muscle once as needed for anaphylaxis    Allergic reaction to bee sting       * estradiol 0.1 MG/GM cream    ESTRACE VAGINAL    42.5 g    Place 2 g vaginally once a week    Menopause       * estradiol 0.1 MG/GM cream    ESTRACE VAGINAL    42.5 g    Place 2  g vaginally once a week    Menopause       fluticasone 50 MCG/ACT spray    FLONASE     Spray 2 sprays into both nostrils daily        ketoprofen 10% in PLO 10% topical gel     30 g    Apply a pea-sized amount to the CMC joint on the Right    CMC arthritis       magnesium 100 MG Caps           progesterone 100 MG    ENDOMETRIN    90 each    Take 100mg progesterone by mouth at bedtime    Menopause       triamcinolone 0.1 % cream    KENALOG    80 g    Apply sparingly to affected area three times daily as needed for 1-2 weeks    Eczema, unspecified type       vitamin B complex with vitamin C Tabs tablet      Take 1 tablet by mouth daily        * Notice:  This list has 2 medication(s) that are the same as other medications prescribed for you. Read the directions carefully, and ask your doctor or other care provider to review them with you.

## 2017-12-12 ENCOUNTER — OFFICE VISIT (OUTPATIENT)
Dept: PSYCHOLOGY | Facility: CLINIC | Age: 52
End: 2017-12-12
Payer: COMMERCIAL

## 2017-12-12 DIAGNOSIS — F41.9 ANXIETY DISORDER, UNSPECIFIED TYPE: Primary | ICD-10-CM
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
## 2017-12-12 NOTE — MR AVS SNAPSHOT
After Visit Summary   12/12/2017    Nkechi Manrique    MRN: [id number]           Patient Information     Date Of Birth          1965        Visit Information        Provider Department      12/12/2017 8:00 AM Kellie Granados, PhD  [facility]         Today's Diagnoses     Anxiety disorder, unspecified type    -  1       Follow-ups after your visit        Your next 10 appointments already scheduled     Jan 08, 2018 11:00 AM CST   Return Visit with Kellie Granados PhD LP   [facility]  ([facility])    [place] [facility]  [address]  [address]  [facility] [address]   [phone]            Jan 24, 2018  2:00 PM CST   Return Visit with Kellie Granados, PhD SHANNON   [facility]  ([facility])    [place] [facility]  [address]  [address]  [facility] [address]   [phone]            Jan 31, 2018  2:00 PM CST   Return Visit with Kellie Granados,     [facility]  ([facility])    [place] [facility]  [address]  [address]  [facility] [address]   [phone]            Feb 07, 2018 10:00 AM CST   Return Visit with Kellie Granados, PhD    [facility]  ([facility])    [place] [facility]  [address]  [address]  [facility] [address]   [phone]            Feb 21, 2018  8:00 AM CST   Return Visit with Kellie Granados, PhD    [facility]  ([facility])    [place] [facility]  [address]  [address]  [facility] [address] [phone]              Who to contact     Please call your clinic at [phone] to:    Ask questions about your health    Make or cancel appointments    Discuss your medicines    Learn about your test results    Speak to your doctor   If you have compliments or  concerns about an experience at your clinic, or if you wish to file a complaint, please contact [facility] Patient Relations at [phone] or email us at [email or web address]         Additional Information About Your Visit        Hightailhart Information     Yodo[employer]t gives you secure access to your electronic health record. If you see a primary care provider, you can also send messages to your care team and make appointments. If you have questions, please call your primary care clinic.  If you do not have a primary care provider, please call [phone] and they will assist you.      Shippo is an electronic gateway that provides easy, online access to your medical records. With [employer], you can request a clinic appointment, read your test results, renew a prescription or communicate with your care team.     To access your existing account, please contact your [facility] or call [phone] for assistance.        Care EveryWhere ID     This is your Care EveryWhere ID. This could be used by other organizations to access your [place] medical records  [id number]        Your Vitals Were     Last Period                   10/16/2015 (Exact Date)            Blood Pressure from Last 3 Encounters:   08/11/17 121/77   06/28/17 110/72   11/06/15 117/68    Weight from Last 3 Encounters:   06/28/17 89 kg (196 lb 4.8 oz)   11/02/15 79.4 kg (175 lb)   10/28/14 79.4 kg (175 lb)              We Performed the Following     Individual Psychotherapy - Psychotherapy with pt and/or family present  60 mins [53+ mins] ([address]686)        Primary Care Provider Office Phone # Fax #    Dinorah Alonso [phone] [address] [address]        Equal Access to Services     KAREN DAVID : Janice Copeland, bell toth, key parra. So [facility]  [phone].    ATENCIÓN: Si perico edmonds, tiene a phelps disposición servicios gratuitos de asistencia lingüística. Claire martini [phone].    We comply with applicable federal civil rights laws and [place] laws. We do not discriminate on the basis of race, color, national origin, age, disability, sex, sexual orientation, or gender identity.            Thank you!     Thank you for choosing [facility]   for your care. Our goal is always to provide you with excellent care. Hearing back from our patients is one way we can continue to improve our services. Please take a few minutes to complete the written survey that you may receive in the mail after your visit with us. Thank you!             Your Updated Medication List - Protect others around you: Learn how to safely use, store and throw away your medicines at www.disposemymeds.org.          This list is accurate as of: 12/12/17 10:29 AM.  Always use your most recent med list.                   Brand Name Dispense Instructions for use Diagnosis    albuterol 108 (90 BASE) MCG/ACT Inhaler    PROAIR HFA    2 Inhaler    Inhale 1-2 puffs into the lungs every 6 hours as needed for shortness of breath / dyspnea    Mild persistent asthma without complication       CALCIUM & VIT D3 BONE HEALTH PO           CLARITIN PO      1 TABLET DAILY        EPINEPHrine 0.3 MG/0.3ML injection 2-pack    EPIPEN/ADRENACLICK/or ANY BX GENERIC EQUIV    0.3 mL    Inject 0.3 mLs (0.3 mg) into the muscle once as needed for anaphylaxis    Allergic reaction to bee sting       * estradiol 0.1 MG/GM cream    ESTRACE VAGINAL    42.5 g    Place 2 g vaginally once a week    Menopause       * estradiol 0.1 MG/GM cream    ESTRACE VAGINAL    42.5 g    Place 2 g vaginally once a week    Menopause       fluticasone 50 MCG/ACT spray    FLONASE     Spray 2 sprays into both nostrils daily        ketoprofen 10% in PLO 10% topical gel     30 g    Apply a pea-sized amount to the CMC joint on  the Right    CMC arthritis       magnesium 100 MG Caps           progesterone 100 MG    ENDOMETRIN    90 each    Take 100mg progesterone by mouth at bedtime    Menopause       triamcinolone 0.1 % cream    KENALOG    80 g    Apply sparingly to affected area three times daily as needed for 1-2 weeks    Eczema, unspecified type       vitamin B complex with vitamin C Tabs tablet      Take 1 tablet by mouth daily        * Notice:  This list has 2 medication(s) that are the same as other medications prescribed for you. Read the directions carefully, and ask your doctor or other care provider to review them with you.

## 2018-01-08 ENCOUNTER — OFFICE VISIT (OUTPATIENT)
Dept: PSYCHOLOGY | Facility: CLINIC | Age: 53
End: 2018-01-08
Payer: COMMERCIAL

## 2018-01-08 DIAGNOSIS — F41.9 ANXIETY DISORDER, UNSPECIFIED TYPE: Primary | ICD-10-CM

## 2018-01-08 NOTE — MR AVS SNAPSHOT
After Visit Summary   1/8/2018    Nkechi Manrique    MRN: [id number]           Patient Information     Date Of Birth          1965        Visit Information        Provider Department      1/8/2018 11:00 AM Kellie Granados, PhD  [facility]         Today's Diagnoses     Anxiety disorder, unspecified type    -  1       Follow-ups after your visit        Your next 10 appointments already scheduled     Jan 24, 2018  2:00 PM CST   Return Visit with Kellie Granados PhD SHANNON   [facility]  ([facility])    [facility]  [address]  [address]  [facility] [address]   [phone]            Jan 31, 2018  2:00 PM CST   Return Visit with Kellie Granados, PhD SHANNON   [facility]  ([facility])    [facility]  [address]  [address]  [facility] [address]   [phone]            Feb 07, 2018 10:00 AM CST   Return Visit with Kellie Granados, PhD    [facility]  ([facility])    [facility]  [address]  [address]  [facility] [address] [phone]            Feb 21, 2018  8:00 AM CST   Return Visit with Kellie Granados, PhD    [facility]  ([facility])    [facility]  [address]  [address]  [facility] [address] [phone]              Who to contact     Please call your clinic at [phone] to:    Ask questions about your health    Make or cancel appointments    Discuss your medicines    Learn about your test results    Speak to your doctor   If you have compliments or concerns about an experience at your clinic, or if you wish to file a complaint, please contact [facility] Patient Relations at [phone] or email us at [email or web address]         Additional Information About Your  Visit        [employer] Information     [employer] gives you secure access to your electronic health record. If you see a primary care provider, you can also send messages to your care team and make appointments. If you have questions, please call your primary care clinic.  If you do not have a primary care provider, please call [phone] and they will assist you.      [employer] is an electronic gateway that provides easy, online access to your medical records. With [employer], you can request a clinic appointment, read your test results, renew a prescription or communicate with your care team.     To access your existing account, please contact your [facility] or call [phone] for assistance.        Care EveryWhere ID     This is your Care EveryWhere ID. This could be used by other organizations to access your [place] medical records  [id number]        Your Vitals Were     Last Period                   10/16/2015 (Exact Date)            Blood Pressure from Last 3 Encounters:   08/11/17 121/77   06/28/17 110/72   11/06/15 117/68    Weight from Last 3 Encounters:   06/28/17 89 kg (196 lb 4.8 oz)   11/02/15 79.4 kg (175 lb)   10/28/14 79.4 kg (175 lb)              We Performed the Following     Individual Psychotherapy - Psychotherapy with pt and/or family present  60 mins [53+ mins] (33796)        Primary Care Provider Office Phone # Fax #    Dinorah Alonso [phone] [facility] [facility] [address] [address]        Equal Access to Services     KAREN DAVID AH: Hadii aad ku hadasho Soomaali, waaxda luqadaha, qaybta kaalmada adeegyada, wax idiin hayamerican dg pierce'mamadou . So [facility] [phone].    ATENCIÓN: Si habla español, tiene a phelps disposición servicios gratuitos de asistencia lingüística. Llame al [phone].    We comply with applicable federal civil rights laws and [place] laws. We do not discriminate on the basis of race, color, national  origin, age, disability, sex, sexual orientation, or gender identity.            Thank you!     Thank you for choosing [facility]   for your care. Our goal is always to provide you with excellent care. Hearing back from our patients is one way we can continue to improve our services. Please take a few minutes to complete the written survey that you may receive in the mail after your visit with us. Thank you!             Your Updated Medication List - Protect others around you: Learn how to safely use, store and throw away your medicines at www.disposemymeds.org.          This list is accurate as of: 1/8/18 12:28 PM.  Always use your most recent med list.                   Brand Name Dispense Instructions for use Diagnosis    albuterol 108 (90 BASE) MCG/ACT Inhaler    PROAIR HFA    2 Inhaler    Inhale 1-2 puffs into the lungs every 6 hours as needed for shortness of breath / dyspnea    Mild persistent asthma without complication       CALCIUM & VIT D3 BONE HEALTH PO           CLARITIN PO      1 TABLET DAILY        EPINEPHrine 0.3 MG/0.3ML injection 2-pack    EPIPEN/ADRENACLICK/or ANY BX GENERIC EQUIV    0.3 mL    Inject 0.3 mLs (0.3 mg) into the muscle once as needed for anaphylaxis    Allergic reaction to bee sting       * estradiol 0.1 MG/GM cream    ESTRACE VAGINAL    42.5 g    Place 2 g vaginally once a week    Menopause       * estradiol 0.1 MG/GM cream    ESTRACE VAGINAL    42.5 g    Place 2 g vaginally once a week    Menopause       fluticasone 50 MCG/ACT spray    FLONASE     Spray 2 sprays into both nostrils daily        ketoprofen 10% in PLO 10% topical gel     30 g    Apply a pea-sized amount to the CMC joint on the Right    CMC arthritis       magnesium 100 MG Caps           progesterone 100 MG    ENDOMETRIN    90 each    Take 100mg progesterone by mouth at bedtime    Menopause       triamcinolone 0.1 % cream    KENALOG    80 g    Apply sparingly to affected area three times daily as  needed for 1-2 weeks    Eczema, unspecified type       vitamin B complex with vitamin C Tabs tablet      Take 1 tablet by mouth daily        * Notice:  This list has 2 medication(s) that are the same as other medications prescribed for you. Read the directions carefully, and ask your doctor or other care provider to review them with you.
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
## 2018-01-31 ENCOUNTER — OFFICE VISIT (OUTPATIENT)
Dept: PSYCHOLOGY | Facility: CLINIC | Age: 53
End: 2018-01-31
Payer: COMMERCIAL

## 2018-01-31 DIAGNOSIS — F41.9 ANXIETY DISORDER, UNSPECIFIED TYPE: Primary | ICD-10-CM
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
## 2018-01-31 NOTE — MR AVS SNAPSHOT
After Visit Summary   1/31/2018    Nkechi Manrique    MRN: [id number]           Patient Information     Date Of Birth          1965        Visit Information        Provider Department      1/31/2018 2:00 PM Kellie Granados, PhD  [facility]         Today's Diagnoses     Anxiety disorder, unspecified type    -  1       Follow-ups after your visit        Your next 10 appointments already scheduled     Feb 07, 2018 10:00 AM CST   Return Visit with Kellie Granados PhD SHANNON   [facility]  ([facility])    [facility] [address]  [address]  [facility] [address]   [phone]            Feb 21, 2018  8:00 AM CST   Return Visit with Kellie Granados, PhD SHANNON   [facility]  ([facility])    [facility] [facility] [address]  [address]  [facility] [address]   [phone]            Mar 06, 2018  3:00 PM CST   Return Visit with Kellie Granados, PhD    [facility]  ([facility])    [facility] [facility] [address]  [address]  [address]  [facility] [address]   [phone]            Mar 12, 2018  2:00 PM CDT   Return Visit with Kellie Granados, PhD    [facility]  ([facility])    [facility] [facility] [address]  [address]  [address]  [facility] [address]   [phone]            Apr 04, 2018 10:00 AM CDT   Return Visit with Kellie Granados, PhD    [facility]  ([facility])    [facility] [address]  [address]  [address]  [facility] [address]   [phone]            Apr 20, 2018 11:00 AM CDT   Return Visit with Kellie Granados, PhD    [facility]  ([facility])    [facility] [facility] [address]  [address]  [address]  [facility] [address]   [phone]             Apr 25, 2018  3:00 PM CDT   Return Visit with Kellie Granados, PhD LP   [facility]  ([facility])    [facility]  3rd [facility] [address]  [address] [id number] [phone]              Who to contact     Please call your clinic at [phone] to:    Ask questions about your health    Make or cancel appointments    Discuss your medicines    Learn about your test results    Speak to your doctor   If you have compliments or concerns about an experience at your clinic, or if you wish to file a complaint, please contact [facility] Patient Relations at [phone] or email us at [email or web address]         Additional Information About Your Visit        [employer] Information     An[employer] gives you secure access to your electronic health record. If you see a primary care provider, you can also send messages to your care team and make appointments. If you have questions, please call your primary care clinic.  If you do not have a primary care provider, please call [phone] and they will assist you.      [employer] is an electronic gateway that provides easy, online access to your medical records. With [employer], you can request a clinic appointment, read your test results, renew a prescription or communicate with your care team.     To access your existing account, please contact your [facility] or call [phone] for assistance.        Care EveryWhere ID     This is your Care EveryWhere ID. This could be used by other organizations to access your [place] medical records  [id number]        Your Vitals Were     Last Period                   10/16/2015 (Exact Date)            Blood Pressure from Last 3 Encounters:   08/11/17 121/77   06/28/17 110/72   11/06/15 117/68    Weight from Last 3 Encounters:   06/28/17 89 kg (196 lb 4.8 oz)   11/02/15 79.4 kg (175 lb)   10/28/14 79.4 kg (175  doron)              We Performed the Following     Individual Psychotherapy - Psychotherapy with pt and/or family present  60 mins [53+ mins] ([address])        Primary Care Provider Office Phone # Fax #    Dinorah Alonso [phone] [address] [address]        Equal Access to Services     VIKTOR JOANN : Hadii aad ku hadasho Soomaali, waaxda luqadaha, qaybta kaalmada adeegyada, waxay idiin hayaan adeeg kharash la'aan . So [facility] [phone].    ATENCIÓN: Si habla español, tiene a phelps disposición servicios gratuitos de asistencia lingüística. Llame al [phone].    We comply with applicable federal civil rights laws and [place] laws. We do not discriminate on the basis of race, color, national origin, age, disability, sex, sexual orientation, or gender identity.            Thank you!     Thank you for choosing [facility]   for your care. Our goal is always to provide you with excellent care. Hearing back from our patients is one way we can continue to improve our services. Please take a few minutes to complete the written survey that you may receive in the mail after your visit with us. Thank you!             Your Updated Medication List - Protect others around you: Learn how to safely use, store and throw away your medicines at www.disposemymeds.org.          This list is accurate as of 1/31/18  5:10 PM.  Always use your most recent med list.                   Brand Name Dispense Instructions for use Diagnosis    albuterol 108 (90 BASE) MCG/ACT Inhaler    PROAIR HFA    2 Inhaler    Inhale 1-2 puffs into the lungs every 6 hours as needed for shortness of breath / dyspnea    Mild persistent asthma without complication       CALCIUM & VIT D3 BONE HEALTH PO           CLARITIN PO      1 TABLET DAILY        EPINEPHrine 0.3 MG/0.3ML injection 2-pack    EPIPEN/ADRENACLICK/or ANY BX GENERIC EQUIV    0.3 mL    Inject 0.3 mLs (0.3 mg) into the muscle  once as needed for anaphylaxis    Allergic reaction to bee sting       * estradiol 0.1 MG/GM cream    ESTRACE VAGINAL    42.5 g    Place 2 g vaginally once a week    Menopause       * estradiol 0.1 MG/GM cream    ESTRACE VAGINAL    42.5 g    Place 2 g vaginally once a week    Menopause       fluticasone 50 MCG/ACT spray    FLONASE     Spray 2 sprays into both nostrils daily        ketoprofen 10% in PLO 10% topical gel     30 g    Apply a pea-sized amount to the CMC joint on the Right    CMC arthritis       magnesium 100 MG Caps           progesterone 100 MG    ENDOMETRIN    90 each    Take 100mg progesterone by mouth at bedtime    Menopause       triamcinolone 0.1 % cream    KENALOG    80 g    Apply sparingly to affected area three times daily as needed for 1-2 weeks    Eczema, unspecified type       vitamin B complex with vitamin C Tabs tablet      Take 1 tablet by mouth daily        * Notice:  This list has 2 medication(s) that are the same as other medications prescribed for you. Read the directions carefully, and ask your doctor or other care provider to review them with you.

## 2018-02-07 ENCOUNTER — OFFICE VISIT (OUTPATIENT)
Dept: PSYCHOLOGY | Facility: CLINIC | Age: 53
End: 2018-02-07
Payer: COMMERCIAL

## 2018-02-07 DIAGNOSIS — F41.9 ANXIETY DISORDER, UNSPECIFIED TYPE: Primary | ICD-10-CM

## 2018-02-07 NOTE — MR AVS SNAPSHOT
"              After Visit Summary   2/7/2018    Nkechi Manrique    MRN: [id number]           Patient Information     Date Of Birth          1965        Visit Information        Provider Department      2/7/2018 10:00 AM Kellie Granados, PhD SHANNON [facility]         Today's Diagnoses     Anxiety disorder, unspecified type    -  1       Follow-ups after your visit        Your next 10 appointments already scheduled     Feb 13, 2018  4:15 PM CST   (Arrive by 4:00 PM)   MA SCREENING BILATERAL W/ QUETA with [id number]   [facility] ([facility])    [address]  2nd Floor  [facility] [address]   [phone]           Three-dimensional (3D) mammograms are available at [facility] locations in [facility], [place], [place], [facility], [facility], and [place]. [facility] locations include [place] and Clinic & [facility] Center in [place]. Benefits of 3D mammograms include: - Improved rate of cancer detection - Decreases your chance of having to go back for more tests, which means fewer: - \"False-positive\" results (This means that there is an abnormal area but it isn't cancer.) - Invasive testing procedures, such as a biopsy or surgery - Can provide clearer images of the breast if you have dense breast tissue. 3D mammography is an optional exam that anyone can have with a 2D mammogram. It doesn't replace or take the place of a 2D mammogram. 2D mammograms remain an effective screening test for all women.  Not all insurance companies cover the cost of a 3D mammogram. Check with your insurance.            Feb 21, 2018  8:00 AM CST   Return Visit with Kellie Granados, PhD SHANNON   [facility]  ([facility])    [facility]  [address]rd [address], [place] [address]  [address]  [facility] [address]   [phone]            Mar 06, 2018  3:00 PM CST   Return Visit with Kellie Granados, PhD SHANNON "   [facility]  ([facility])    [facility]  [address]rd [address] [place] [address]  [address]  [facility] [address]   [phone]            Mar 12, 2018  2:00 PM CDT   Return Visit with Kellie Granados, PhD SHANNON   [facility]  ([facility])    [facility]  [address]  [address]  [facility] [address] [phone]            Apr 04, 2018 10:00 AM CDT   Return Visit with Kellie Granados, PhD SHANNON   [facility]  ([facility])    [facility]  [address]  [address]  [facility] [address] [phone]            Apr 20, 2018 11:00 AM CDT   Return Visit with Kellie Granados, PhD SHANNON   [facility]  ([facility])    [facility]  [address]  [address]  [facility] [address] [phone]            Apr 25, 2018  3:00 PM CDT   Return Visit with Kellie Granados, PhD    [facility]  ([facility])    [facility]  [address]  [address]  [facility] [id number] [phone]              Who to contact     Please call your clinic at [phone] to:    Ask questions about your health    Make or cancel appointments    Discuss your medicines    Learn about your test results    Speak to your doctor   If you have compliments or concerns about an experience at your clinic, or if you wish to file a complaint, please contact [facility] Patient Relations at [phone] or email us at [email or web address]         Additional Information About Your Visit        MyChart Information     [employer] gives you secure access to your electronic health record. If you see a primary care provider, you can also send messages to your care team and make appointments. If you have questions, please call your primary care clinic.  If  you do not have a primary care provider, please call [phone] and they will assist you.      [employer] is an electronic gateway that provides easy, online access to your medical records. With [employer], you can request a clinic appointment, read your test results, renew a prescription or communicate with your care team.     To access your existing account, please contact your [facility] or call [phone] for assistance.        Care EveryWhere ID     This is your Care EveryWhere ID. This could be used by other organizations to access your [place] medical records  [id number]        Your Vitals Were     Last Period                   10/16/2015 (Exact Date)            Blood Pressure from Last 3 Encounters:   08/11/17 121/77   06/28/17 110/72   11/06/15 117/68    Weight from Last 3 Encounters:   06/28/17 89 kg (196 lb 4.8 oz)   11/02/15 79.4 kg (175 lb)   10/28/14 79.4 kg (175 lb)              We Performed the Following     Individual Psychotherapy - Psychotherapy with pt and/or family present 45 mins [38-52 mins] ([address])        Primary Care Provider Office Phone # Fax #    Dinorah Alonso [phone] [address] [address]        Equal Access to Services     KAREN DAVID : Hadii aad ku hadasho Soomaali, waaxda luqadaha, qaybta kaalmada adeegyada, key lesterin haymamadou hinson. So [facility] [phone].    ATENCIÓN: Si habla español, tiene a phelps disposición servicios gratuitos de asistencia lingüística. Llame al [phone].    We comply with applicable federal civil rights laws and [place] laws. We do not discriminate on the basis of race, color, national origin, age, disability, sex, sexual orientation, or gender identity.            Thank you!     Thank you for choosing [facility]   for your care. Our goal is always to provide you with excellent care. Hearing back from our patients is one way we  can continue to improve our services. Please take a few minutes to complete the written survey that you may receive in the mail after your visit with us. Thank you!             Your Updated Medication List - Protect others around you: Learn how to safely use, store and throw away your medicines at www.disposemymeds.org.          This list is accurate as of 2/7/18 11:51 AM.  Always use your most recent med list.                   Brand Name Dispense Instructions for use Diagnosis    albuterol 108 (90 BASE) MCG/ACT Inhaler    PROAIR HFA    2 Inhaler    Inhale 1-2 puffs into the lungs every 6 hours as needed for shortness of breath / dyspnea    Mild persistent asthma without complication       CALCIUM & VIT D3 BONE HEALTH PO           CLARITIN PO      1 TABLET DAILY        EPINEPHrine 0.3 MG/0.3ML injection 2-pack    EPIPEN/ADRENACLICK/or ANY BX GENERIC EQUIV    0.3 mL    Inject 0.3 mLs (0.3 mg) into the muscle once as needed for anaphylaxis    Allergic reaction to bee sting       * estradiol 0.1 MG/GM cream    ESTRACE VAGINAL    42.5 g    Place 2 g vaginally once a week    Menopause       * estradiol 0.1 MG/GM cream    ESTRACE VAGINAL    42.5 g    Place 2 g vaginally once a week    Menopause       fluticasone 50 MCG/ACT spray    FLONASE     Spray 2 sprays into both nostrils daily        ketoprofen 10% in PLO 10% topical gel     30 g    Apply a pea-sized amount to the CMC joint on the Right    CMC arthritis       magnesium 100 MG Caps           progesterone 100 MG    ENDOMETRIN    90 each    Take 100mg progesterone by mouth at bedtime    Menopause       triamcinolone 0.1 % cream    KENALOG    80 g    Apply sparingly to affected area three times daily as needed for 1-2 weeks    Eczema, unspecified type       vitamin B complex with vitamin C Tabs tablet      Take 1 tablet by mouth daily        * Notice:  This list has 2 medication(s) that are the same as other medications prescribed for you. Read the directions  carefully, and ask your doctor or other care provider to review them with you.
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
## 2018-02-13 ENCOUNTER — RADIANT APPOINTMENT (OUTPATIENT)
Dept: MAMMOGRAPHY | Facility: CLINIC | Age: 53
End: 2018-02-13
Payer: COMMERCIAL

## 2018-02-13 DIAGNOSIS — Z12.31 VISIT FOR SCREENING MAMMOGRAM: ICD-10-CM

## 2018-02-21 ENCOUNTER — OFFICE VISIT (OUTPATIENT)
Dept: PSYCHOLOGY | Facility: CLINIC | Age: 53
End: 2018-02-21
Payer: COMMERCIAL

## 2018-02-21 DIAGNOSIS — F41.9 ANXIETY DISORDER, UNSPECIFIED TYPE: Primary | ICD-10-CM

## 2018-02-21 NOTE — MR AVS SNAPSHOT
After Visit Summary   2/21/2018    Nkechi Manrique    MRN: [id number]           Patient Information     Date Of Birth          1965        Visit Information        Provider Department      2/21/2018 8:00 AM Kellie Granados, PhD  [facility]         Today's Diagnoses     Anxiety disorder, unspecified type    -  1       Follow-ups after your visit        Your next 10 appointments already scheduled     Mar 06, 2018  3:00 PM CST   Return Visit with Kellie Granados PhD SHANNON   [facility]  ([facility])    [place] [facility]  [address]  [facility] [address]   [phone]            Mar 12, 2018  2:00 PM CDT   Return Visit with Kellie Granados, PhD SHANNON   [facility]  ([facility])    [place] [facility]  [address]  [address]  [facility] [address]   [phone]            Apr 04, 2018 10:00 AM CDT   Return Visit with Kellie Granados,     [facility]  ([facility])    [place] [facility]  [address]  [address]  [facility] [address] [phone]            Apr 20, 2018 11:00 AM CDT   Return Visit with Kellie Granados, PhD    [facility]  ([facility])    [place] [facility]  [address]  [address]  [facility] [address] [phone]            Apr 25, 2018  3:00 PM CDT   Return Visit with Kellie Granados, PhD    [facility]  ([facility])    [place] [facility]  [address]  [address]  [facility] [address] [phone]              Who to contact     Please call your clinic at [phone] to:    Ask questions about your health    Make or cancel appointments    Discuss your medicines    Learn about your test results    Speak to your doctor            Additional Information  About Your Visit        [employer] Information     [employer]ar[employer] gives you secure access to your electronic health record. If you see a primary care provider, you can also send messages to your care team and make appointments. If you have questions, please call your primary care clinic.  If you do not have a primary care provider, please call [phone] and they will assist you.      Zarpo is an electronic gateway that provides easy, online access to your medical records. With Zarpo, you can request a clinic appointment, read your test results, renew a prescription or communicate with your care team.     To access your existing account, please contact your [facility] or call [phone] for assistance.        Care EveryWhere ID     This is your Care EveryWhere ID. This could be used by other organizations to access your [place] medical records  [id number]        Your Vitals Were     Last Period                   10/16/2015 (Exact Date)            Blood Pressure from Last 3 Encounters:   08/11/17 121/77   06/28/17 110/72   11/06/15 117/68    Weight from Last 3 Encounters:   06/28/17 89 kg (196 lb 4.8 oz)   11/02/15 79.4 kg (175 lb)   10/28/14 79.4 kg (175 lb)              We Performed the Following     Individual Psychotherapy - Psychotherapy with pt and/or family present  60 mins [53+ mins] (09274)        Primary Care Provider Office Phone # Fax #    Dinorah Alonso [phone] [address] [address]        Equal Access to Services     KAREN DAVID AH: Hadii aad ku hadasho Soomaali, waaxda luqadaha, qaybta kaalmada adeegyada, key lesterin haymamadou tao . So [facility] [phone].    ATENCIÓN: Si habla español, tiene a phelps disposición servicios gratuitos de asistencia lingüística. Llame al [phone].    We comply with applicable federal civil rights laws and [place] laws. We do not discriminate on the basis of race,  color, national origin, age, disability, sex, sexual orientation, or gender identity.            Thank you!     Thank you for choosing [facility]   for your care. Our goal is always to provide you with excellent care. Hearing back from our patients is one way we can continue to improve our services. Please take a few minutes to complete the written survey that you may receive in the mail after your visit with us. Thank you!             Your Updated Medication List - Protect others around you: Learn how to safely use, store and throw away your medicines at www.disposemymeds.org.          This list is accurate as of 2/21/18 11:59 PM.  Always use your most recent med list.                   Brand Name Dispense Instructions for use Diagnosis    albuterol 108 (90 BASE) MCG/ACT Inhaler    PROAIR HFA    2 Inhaler    Inhale 1-2 puffs into the lungs every 6 hours as needed for shortness of breath / dyspnea    Mild persistent asthma without complication       CALCIUM & VIT D3 BONE HEALTH PO           CLARITIN PO      1 TABLET DAILY        EPINEPHrine 0.3 MG/0.3ML injection 2-pack    EPIPEN/ADRENACLICK/or ANY BX GENERIC EQUIV    0.3 mL    Inject 0.3 mLs (0.3 mg) into the muscle once as needed for anaphylaxis    Allergic reaction to bee sting       * estradiol 0.1 MG/GM cream    ESTRACE VAGINAL    42.5 g    Place 2 g vaginally once a week    Menopause       * estradiol 0.1 MG/GM cream    ESTRACE VAGINAL    42.5 g    Place 2 g vaginally once a week    Menopause       fluticasone 50 MCG/ACT spray    FLONASE     Spray 2 sprays into both nostrils daily        ketoprofen 10% in PLO 10% topical gel     30 g    Apply a pea-sized amount to the CMC joint on the Right    CMC arthritis       magnesium 100 MG Caps           progesterone 100 MG    ENDOMETRIN    90 each    Take 100mg progesterone by mouth at bedtime    Menopause       triamcinolone 0.1 % cream    KENALOG    80 g    Apply sparingly to affected area  three times daily as needed for 1-2 weeks    Eczema, unspecified type       vitamin B complex with vitamin C Tabs tablet      Take 1 tablet by mouth daily        * Notice:  This list has 2 medication(s) that are the same as other medications prescribed for you. Read the directions carefully, and ask your doctor or other care provider to review them with you.
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
## 2018-03-06 ENCOUNTER — OFFICE VISIT (OUTPATIENT)
Dept: PSYCHOLOGY | Facility: CLINIC | Age: 53
End: 2018-03-06
Payer: COMMERCIAL

## 2018-03-06 DIAGNOSIS — F41.9 ANXIETY DISORDER, UNSPECIFIED TYPE: Primary | ICD-10-CM

## 2018-03-06 NOTE — MR AVS SNAPSHOT
After Visit Summary   3/6/2018    Nkechi Manrique    MRN: [id number]           Patient Information     Date Of Birth          1965        Visit Information        Provider Department      3/6/2018 3:00 PM Kellie Granados, PhD  [facility]         Today's Diagnoses     Anxiety disorder, unspecified type    -  1       Follow-ups after your visit        Your next 10 appointments already scheduled     Apr 04, 2018 10:00 AM CDT   Return Visit with Kellie Granados, PhD SHANNON   [facility]  ([facility])    [place] [facility]  [address]  [address]  [facility] [address]   [phone]            Apr 20, 2018 11:00 AM CDT   Return Visit with Kellie Granados, PhD SHANNON   [facility]  ([facility])    [place] [facility]  [address]  [address]  [facility] [id number] [phone]            Apr 25, 2018  3:00 PM CDT   Return Visit with Kellie Granados, PhD    [facility]  ([facility])    [place] [facility]  [address]  [address]  [facility] [id number] [phone]              Who to contact     Please call your clinic at [phone] to:    Ask questions about your health    Make or cancel appointments    Discuss your medicines    Learn about your test results    Speak to your doctor            Additional Information About Your Visit        [employer]gent [employer] Information     [employer] gives you secure access to your electronic health record. If you see a primary care provider, you can also send messages to your care team and make appointments. If you have questions, please call your primary care clinic.  If you do not have a primary care provider, please call [phone] and they will assist you.      G[employer]moz is an electronic gateway that provides easy, online access to your medical records. With [employer], you can request  a clinic appointment, read your test results, renew a prescription or communicate with your care team.     To access your existing account, please contact your [facility] or call [phone] for assistance.        Care EveryWhere ID     This is your Care EveryWhere ID. This could be used by other organizations to access your [place] medical records  [id number]        Your Vitals Were     Last Period                   10/16/2015 (Exact Date)            Blood Pressure from Last 3 Encounters:   08/11/17 121/77   06/28/17 110/72   11/06/15 117/68    Weight from Last 3 Encounters:   06/28/17 89 kg (196 lb 4.8 oz)   11/02/15 79.4 kg (175 lb)   10/28/14 79.4 kg (175 lb)              We Performed the Following     Individual Psychotherapy - Psychotherapy with pt and/or family present  60 mins [53+ mins] (24519)        Primary Care Provider Office Phone # Fax #    Dinorah Alonso [phone] [address] [address]        Equal Access to Services     [facility]: Hadii aad ku hadasho Soannika, waaxda luqadaha, qaybta kaalmada faviola, key tao . So [facility] [phone].    ATENCIÓN: Si habla español, tiene a phelps disposición servicios gratuitos de asistencia lingüística. Llame al [phone].    We comply with applicable federal civil rights laws and [place] laws. We do not discriminate on the basis of race, color, national origin, age, disability, sex, sexual orientation, or gender identity.            Thank you!     Thank you for choosing [facility]   for your care. Our goal is always to provide you with excellent care. Hearing back from our patients is one way we can continue to improve our services. Please take a few minutes to complete the written survey that you may receive in the mail after your visit with us. Thank you!             Your Updated Medication List - Protect  others around you: Learn how to safely use, store and throw away your medicines at www.disposemymeds.org.          This list is accurate as of 3/6/18  4:23 PM.  Always use your most recent med list.                   Brand Name Dispense Instructions for use Diagnosis    albuterol 108 (90 BASE) MCG/ACT Inhaler    PROAIR HFA    2 Inhaler    Inhale 1-2 puffs into the lungs every 6 hours as needed for shortness of breath / dyspnea    Mild persistent asthma without complication       CALCIUM & VIT D3 BONE HEALTH PO           CLARITIN PO      1 TABLET DAILY        EPINEPHrine 0.3 MG/0.3ML injection 2-pack    EPIPEN/ADRENACLICK/or ANY BX GENERIC EQUIV    0.3 mL    Inject 0.3 mLs (0.3 mg) into the muscle once as needed for anaphylaxis    Allergic reaction to bee sting       * estradiol 0.1 MG/GM cream    ESTRACE VAGINAL    42.5 g    Place 2 g vaginally once a week    Menopause       * estradiol 0.1 MG/GM cream    ESTRACE VAGINAL    42.5 g    Place 2 g vaginally once a week    Menopause       fluticasone 50 MCG/ACT spray    FLONASE     Spray 2 sprays into both nostrils daily        ketoprofen 10% in PLO 10% topical gel     30 g    Apply a pea-sized amount to the CMC joint on the Right    CMC arthritis       magnesium 100 MG Caps           progesterone 100 MG    ENDOMETRIN    90 each    Take 100mg progesterone by mouth at bedtime    Menopause       triamcinolone 0.1 % cream    KENALOG    80 g    Apply sparingly to affected area three times daily as needed for 1-2 weeks    Eczema, unspecified type       vitamin B complex with vitamin C Tabs tablet      Take 1 tablet by mouth daily        * Notice:  This list has 2 medication(s) that are the same as other medications prescribed for you. Read the directions carefully, and ask your doctor or other care provider to review them with you.
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
## 2018-04-04 ENCOUNTER — OFFICE VISIT (OUTPATIENT)
Dept: PSYCHOLOGY | Facility: CLINIC | Age: 53
End: 2018-04-04
Payer: COMMERCIAL

## 2018-04-04 DIAGNOSIS — F41.9 ANXIETY DISORDER, UNSPECIFIED TYPE: Primary | ICD-10-CM
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
## 2018-04-04 NOTE — MR AVS SNAPSHOT
After Visit Summary   4/4/2018    Nkechi Manrique    MRN: [id number]           Patient Information     Date Of Birth          1965        Visit Information        Provider Department      4/4/2018 10:00 AM Kellie Granados, PhD  [facility]         Today's Diagnoses     Anxiety disorder, unspecified type    -  1       Follow-ups after your visit        Your next 10 appointments already scheduled     Apr 20, 2018 11:00 AM CDT   Return Visit with Kellie Granados, PhD SHANNON   [facility]  ([facility])    [place] [facility]  [address]  [facility] [address]   [phone]            Apr 25, 2018  3:00 PM CDT   Return Visit with Kellie Granados, PhD    [facility]  ([facility])    [place] [facility]  [address]  [address]  [facility] [address]   [phone]            Jun 01, 2018 11:00 AM CDT   Return Visit with Kellie Granados, PhD    [facility]  ([facility])    [place] [facility]  [address]  [address]  [facility] [address]   [phone]            Jun 20, 2018  9:00 AM CDT   Return Visit with Kellie Granados, PhD    [facility]  ([facility])    [place] [facility]  [address]  [address]  [facility] [address]   [phone]            Jul 11, 2018 10:00 AM CDT   Return Visit with Kellie Granados, PhD    [facility]  ([facility])    [place] [facility]  [address]  [address]  [facility] [address]   [phone]            Jul 25, 2018 10:00 AM CDT   Return Visit with Kellie Granados, PhD    [facility]  ([facility])    [place] [facility]  [address]  [address]  [facility] [address]   [phone]               Who to contact     Please call your clinic at [phone] to:    Ask questions about your health    Make or cancel appointments    Discuss your medicines    Learn about your test results    Speak to your doctor            Additional Information About Your Visit        [employer] Information     [facility] gives you secure access to your electronic health record. If you see a primary care provider, you can also send messages to your care team and make appointments. If you have questions, please call your primary care clinic.  If you do not have a primary care provider, please call [phone] and they will assist you.      [facility] is an electronic gateway that provides easy, online access to your medical records. With [facility], you can request a clinic appointment, read your test results, renew a prescription or communicate with your care team.     To access your existing account, please contact your [facility] or call [phone] for assistance.        Care EveryWhere ID     This is your Care EveryWhere ID. This could be used by other organizations to access your [place] medical records  [id number]        Your Vitals Were     Last Period                   10/16/2015 (Exact Date)            Blood Pressure from Last 3 Encounters:   08/11/17 121/77   06/28/17 110/72   11/06/15 117/68    Weight from Last 3 Encounters:   06/28/17 89 kg (196 lb 4.8 oz)   11/02/15 79.4 kg (175 lb)   10/28/14 79.4 kg (175 lb)              We Performed the Following     Individual Psychotherapy - Psychotherapy with pt and/or family present  60 mins [53+ mins] ([address])        Primary Care Provider Office Phone # Fax #    Dinorah Alonso [phone] [address] [address]        Equal Access to Services     KAREN DAVID : Janice Copeland, bell toth, key parra . [facility]  [phone].    ATENCIÓN: Si perico edmonds, tiene a phelps disposición servicios gratuitos de asistencia lingüística. Claire martini [phone].    We comply with applicable federal civil rights laws and [place] laws. We do not discriminate on the basis of race, color, national origin, age, disability, sex, sexual orientation, or gender identity.            Thank you!     Thank you for choosing [facility]   for your care. Our goal is always to provide you with excellent care. Hearing back from our patients is one way we can continue to improve our services. Please take a few minutes to complete the written survey that you may receive in the mail after your visit with us. Thank you!             Your Updated Medication List - Protect others around you: Learn how to safely use, store and throw away your medicines at www.disposemymeds.org.          This list is accurate as of 4/4/18  5:30 PM.  Always use your most recent med list.                   Brand Name Dispense Instructions for use Diagnosis    albuterol 108 (90 BASE) MCG/ACT Inhaler    PROAIR HFA    2 Inhaler    Inhale 1-2 puffs into the lungs every 6 hours as needed for shortness of breath / dyspnea    Mild persistent asthma without complication       CALCIUM & VIT D3 BONE HEALTH PO           CLARITIN PO      1 TABLET DAILY        EPINEPHrine 0.3 MG/0.3ML injection 2-pack    EPIPEN/ADRENACLICK/or ANY BX GENERIC EQUIV    0.3 mL    Inject 0.3 mLs (0.3 mg) into the muscle once as needed for anaphylaxis    Allergic reaction to bee sting       * estradiol 0.1 MG/GM cream    ESTRACE VAGINAL    42.5 g    Place 2 g vaginally once a week    Menopause       * estradiol 0.1 MG/GM cream    ESTRACE VAGINAL    42.5 g    Place 2 g vaginally once a week    Menopause       fluticasone 50 MCG/ACT spray    FLONASE     Spray 2 sprays into both nostrils daily        ketoprofen 10% in PLO 10% topical gel     30 g    Apply a pea-sized amount to the CMC joint on the  Right    CMC arthritis       magnesium 100 MG Caps           progesterone 100 MG    ENDOMETRIN    90 each    Take 100mg progesterone by mouth at bedtime    Menopause       triamcinolone 0.1 % cream    KENALOG    80 g    Apply sparingly to affected area three times daily as needed for 1-2 weeks    Eczema, unspecified type       vitamin B complex with vitamin C Tabs tablet      Take 1 tablet by mouth daily        * Notice:  This list has 2 medication(s) that are the same as other medications prescribed for you. Read the directions carefully, and ask your doctor or other care provider to review them with you.

## 2018-04-20 ENCOUNTER — OFFICE VISIT (OUTPATIENT)
Dept: PSYCHOLOGY | Facility: CLINIC | Age: 53
End: 2018-04-20
Payer: COMMERCIAL

## 2018-04-20 DIAGNOSIS — F41.9 ANXIETY DISORDER, UNSPECIFIED TYPE: Primary | ICD-10-CM
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
## 2018-04-25 ENCOUNTER — OFFICE VISIT (OUTPATIENT)
Dept: PSYCHOLOGY | Facility: CLINIC | Age: 53
End: 2018-04-25
Payer: COMMERCIAL

## 2018-04-25 DIAGNOSIS — F41.9 ANXIETY DISORDER, UNSPECIFIED TYPE: Primary | ICD-10-CM

## 2018-04-25 NOTE — MR AVS SNAPSHOT
After Visit Summary   4/25/2018    Nkechi Manrique    MRN: [id number]           Patient Information     Date Of Birth          1965        Visit Information        Provider Department      4/25/2018 3:00 PM Kellie Granados, PhD  [facility]         Today's Diagnoses     Anxiety disorder, unspecified type    -  1       Follow-ups after your visit        Your next 10 appointments already scheduled     Jun 01, 2018 11:00 AM CDT   Return Visit with Kellie Granados, PhD SHANNON   [facility]  ([facility])    [facility]  [address]  [facility] [address]   [phone]            Jun 20, 2018  9:00 AM CDT   Return Visit with Kellie Grandaos, PhD    [facility]  ([facility])    [facility]  [address]  [address]  [facility] [address]   [phone]            Jul 11, 2018 10:00 AM CDT   Return Visit with Kellie Granados, PhD    [facility]  ([facility])    [facility]  [address]  [facility] [address]   [phone]            Jul 25, 2018 10:00 AM CDT   Return Visit with Kellie Granados, PhD    [facility]  ([facility])    [facility]  [address]  [facility] [address]   [phone]            Aug 15, 2018  2:00 PM CDT   Return Visit with Kellie Granados, PhD    [facility]  ([facility])    [facility]  [address]  [address]  [facility] [address]   [phone]            Aug 27, 2018  9:00 AM CDT   Return Visit with Kellie Granados, PhD    [facility]  ([facility])    [facility]  [address]  [address]  [facility] [address]   [phone]               Who to contact     Please call your clinic at [phone] to:    Ask questions about your health    Make or cancel appointments    Discuss your medicines    Learn about your test results    Speak to your doctor            Additional Information About Your Visit        [employer] Information     [employer] gives you secure access to your electronic health record. If you see a primary care provider, you can also send messages to your care team and make appointments. If you have questions, please call your primary care clinic.  If you do not have a primary care provider, please call [phone] and they will assist you.      [employer] is an electronic gateway that provides easy, online access to your medical records. With [employer], you can request a clinic appointment, read your test results, renew a prescription or communicate with your care team.     To access your existing account, please contact your [facility] or call [phone] for assistance.        Care EveryWhere ID     This is your Care EveryWhere ID. This could be used by other organizations to access your [facility] medical records  [id number]        Your Vitals Were     Last Period                   10/16/2015 (Exact Date)            Blood Pressure from Last 3 Encounters:   08/11/17 121/77   06/28/17 110/72   11/06/15 117/68    Weight from Last 3 Encounters:   06/28/17 89 kg (196 lb 4.8 oz)   11/02/15 79.4 kg (175 lb)   10/28/14 79.4 kg (175 lb)              We Performed the Following     Individual Psychotherapy - Psychotherapy with pt and/or family present  60 mins [53+ mins] ([id number])        Primary Care Provider Office Phone # Fax #    Dinorah Alonso [phone] [address] [address]        Equal Access to Services     KAREN DAVID : Janice Copeland, bell toth, key parra . So [facility]  [phone].    ATENCIÓN: Si perico edmonds, tiene a phelps disposición servicios gratuitos de asistencia lingüística. Claire martini [phone].    We comply with applicable federal civil rights laws and [place] laws. We do not discriminate on the basis of race, color, national origin, age, disability, sex, sexual orientation, or gender identity.            Thank you!     Thank you for choosing [facility]   for your care. Our goal is always to provide you with excellent care. Hearing back from our patients is one way we can continue to improve our services. Please take a few minutes to complete the written survey that you may receive in the mail after your visit with us. Thank you!             Your Updated Medication List - Protect others around you: Learn how to safely use, store and throw away your medicines at www.disposemymeds.org.          This list is accurate as of 4/25/18  4:16 PM.  Always use your most recent med list.                   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       * estradiol 0.1 MG/GM cream    ESTRACE VAGINAL    42.5 g    Place 2 g vaginally once a week    Menopause       * estradiol 0.1 MG/GM cream    ESTRACE VAGINAL    42.5 g    Place 2 g vaginally once a week    Menopause       fluticasone 50 MCG/ACT spray    FLONASE     Spray 2 sprays into both nostrils daily        ketoprofen 10% in PLO 10% topical gel     30 g    Apply a pea-sized amount to the CMC joint on the  Right    CMC arthritis       magnesium 100 MG Caps           progesterone 100 MG    ENDOMETRIN    90 each    Take 100mg progesterone by mouth at bedtime    Menopause       triamcinolone 0.1 % cream    KENALOG    80 g    Apply sparingly to affected area three times daily as needed for 1-2 weeks    Eczema, unspecified type       vitamin B complex with vitamin C Tabs tablet      Take 1 tablet by mouth daily        * Notice:  This list has 2 medication(s) that are the same as other medications prescribed for you. Read the directions carefully, and ask your doctor or other care provider to review them with you.
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
## 2018-06-01 ENCOUNTER — OFFICE VISIT (OUTPATIENT)
Dept: PSYCHOLOGY | Facility: CLINIC | Age: 53
End: 2018-06-01
Payer: COMMERCIAL

## 2018-06-01 DIAGNOSIS — F41.9 ANXIETY DISORDER, UNSPECIFIED TYPE: Primary | ICD-10-CM
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
## 2018-06-01 NOTE — MR AVS SNAPSHOT
After Visit Summary   6/1/2018    Nkechi Manrique    MRN: [id number]           Patient Information     Date Of Birth          1965        Visit Information        Provider Department      6/1/2018 11:00 AM Kellie Granados, PhD  [facility]         Today's Diagnoses     Anxiety disorder, unspecified type    -  1       Follow-ups after your visit        Your next 10 appointments already scheduled     Jun 20, 2018  9:00 AM CDT   Return Visit with Kellie Granados PhD LP   [facility]  ([facility])    [facility]  [address]  [facility] [address]   [phone]            Jul 11, 2018 10:00 AM CDT   Return Visit with Kellie Granados, PhD SHANNON   [facility]  ([facility])    [facility]  [address]  [address]  [facility] [address]   [phone]            Jul 25, 2018 10:00 AM CDT   Return Visit with Kellie Granados,     [facility]  ([facility])    [facility] [place] [facility]  [address]  [address]  [facility] [address] [phone]            Aug 15, 2018  2:00 PM CDT   Return Visit with Kellie Granados, PhD    [facility]  ([facility])    [facility] [place] [facility]  [address]  [address]  [facility] [address] [phone]            Aug 27, 2018  9:00 AM CDT   Return Visit with Kellie Granados, PhD    [facility]  ([facility])    [facility] [place] [facility]  [address]  [address]  [facility] [address] [phone]              Who to contact     Please call your clinic at [phone] to:    Ask questions about your health    Make or cancel appointments    Discuss your medicines    Learn about your test results    Speak to your doctor            Additional Information  About Your Visit        [employer] Information     [employer] gives you secure access to your electronic health record. If you see a primary care provider, you can also send messages to your care team and make appointments. If you have questions, please call your primary care clinic.  If you do not have a primary care provider, please call [phone] and they will assist you.      [employer] is an electronic gateway that provides easy, online access to your medical records. With [employer], you can request a clinic appointment, read your test results, renew a prescription or communicate with your care team.     To access your existing account, please contact your [facility] or call [phone] for assistance.        Care EveryWhere ID     This is your Care EveryWhere ID. This could be used by other organizations to access your [facility] medical records  [id number]        Your Vitals Were     Last Period                   10/16/2015 (Exact Date)            Blood Pressure from Last 3 Encounters:   08/11/17 121/77   06/28/17 110/72   11/06/15 117/68    Weight from Last 3 Encounters:   06/28/17 89 kg (196 lb 4.8 oz)   11/02/15 79.4 kg (175 lb)   10/28/14 79.4 kg (175 lb)              We Performed the Following     Individual Psychotherapy - Psychotherapy with pt and/or family present  60 mins [53+ mins] (64549)        Primary Care Provider Office Phone # Fax #    Dinorah Alonso [phone] [address] [address]        Equal Access to Services     KAREN DAVID AH: Hadii aad ku hadasho Soomaali, waaxda luqadaha, qaybta kaalmada adeegyada, key lesterin haymamadou tao . So [facility] [phone].    ATENCIÓN: Si habla español, tiene a phelps disposición servicios gratuitos de asistencia lingüística. Llame al [phone].    We comply with applicable federal civil rights laws and [place] laws. We do not discriminate on the basis of race,  color, national origin, age, disability, sex, sexual orientation, or gender identity.            Thank you!     Thank you for choosing [facility]   for your care. Our goal is always to provide you with excellent care. Hearing back from our patients is one way we can continue to improve our services. Please take a few minutes to complete the written survey that you may receive in the mail after your visit with us. Thank you!             Your Updated Medication List - Protect others around you: Learn how to safely use, store and throw away your medicines at www.disposemymeds.org.          This list is accurate as of 6/1/18  5:41 PM.  Always use your most recent med list.                   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       * estradiol 0.1 MG/GM cream    ESTRACE VAGINAL    42.5 g    Place 2 g vaginally once a week    Menopause       * estradiol 0.1 MG/GM cream    ESTRACE VAGINAL    42.5 g    Place 2 g vaginally once a week    Menopause       fluticasone 50 MCG/ACT spray    FLONASE     Spray 2 sprays into both nostrils daily        ketoprofen 10% in PLO 10% topical gel     30 g    Apply a pea-sized amount to the CMC joint on the Right    CMC arthritis       magnesium 100 MG Caps           progesterone 100 MG    ENDOMETRIN    90 each    Take 100mg progesterone by mouth at bedtime    Menopause       triamcinolone 0.1 % cream    KENALOG    80 g    Apply sparingly to affected area three  times daily as needed for 1-2 weeks    Eczema, unspecified type       vitamin B complex with vitamin C Tabs tablet      Take 1 tablet by mouth daily        * Notice:  This list has 2 medication(s) that are the same as other medications prescribed for you. Read the directions carefully, and ask your doctor or other care provider to review them with you.

## 2018-06-20 ENCOUNTER — OFFICE VISIT (OUTPATIENT)
Dept: PSYCHOLOGY | Facility: CLINIC | Age: 53
End: 2018-06-20
Payer: COMMERCIAL

## 2018-06-20 DIAGNOSIS — F41.9 ANXIETY DISORDER, UNSPECIFIED TYPE: Primary | ICD-10-CM
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
## 2018-06-20 NOTE — MR AVS SNAPSHOT
After Visit Summary   6/20/2018    Nkechi Manrique    MRN: [id number]           Patient Information     Date Of Birth          1965        Visit Information        Provider Department      6/20/2018 9:00 AM Kellie Granados, PhD  [facility]         Today's Diagnoses     Anxiety disorder, unspecified type    -  1       Follow-ups after your visit        Your next 10 appointments already scheduled     Jul 11, 2018 10:00 AM CDT   Return Visit with Kellie Granados PhD SHANNON   [facility]  ([facility])    [facility]  [address]  [address]  [facility] [address]   [phone]            Jul 25, 2018 10:00 AM CDT   Return Visit with Kellie Granados, PhD SHANNON   [facility]  ([facility])    [facility]  [address]  [address]  [facility] [address] [phone]            Aug 15, 2018  2:00 PM CDT   Return Visit with Kellie Granados, PhD    [facility]  ([facility])    [facility]  [address]  [address]  [facility] [address] [phone]            Aug 27, 2018  9:00 AM CDT   Return Visit with Kellie Granados, PhD    [facility]  ([facility])    [facility]  [address]  [address]  [facility] [id number] [phone]              Who to contact     Please call your clinic at [phone] to:    Ask questions about your health    Make or cancel appointments    Discuss your medicines    Learn about your test results    Speak to your doctor            Additional Information About Your Visit        MyChart Information     [employer] gives you secure access to your electronic health record. If you see a primary care provider, you can also send messages to your care team and make appointments. If you have questions, please  call your primary care clinic.  If you do not have a primary care provider, please call [phone] and they will assist you.      [employer] is an electronic gateway that provides easy, online access to your medical records. With [employer], you can request a clinic appointment, read your test results, renew a prescription or communicate with your care team.     To access your existing account, please contact your [facility] or call [phone] for assistance.        Care EveryWhere ID     This is your Care EveryWhere ID. This could be used by other organizations to access your [facility] medical records  [id number]        Your Vitals Were     Last Period                   10/16/2015 (Exact Date)            Blood Pressure from Last 3 Encounters:   08/11/17 121/77   06/28/17 110/72   11/06/15 117/68    Weight from Last 3 Encounters:   06/28/17 89 kg (196 lb 4.8 oz)   11/02/15 79.4 kg (175 lb)   10/28/14 79.4 kg (175 lb)              We Performed the Following     Individual Psychotherapy - Psychotherapy with pt and/or family present  60 mins [53+ mins] ([address])        Primary Care Provider Office Phone # Fax #    Dinorah Alonso [phone] [address] [address]        Equal Access to Services     KAREN DAVID AH: Hadii aad ku hadasho Soomaali, waaxda luqadaha, qaybta kaalmada adeegyada, waxnicky idiin hayaan dg hinson. So [facility] [phone].    ATENCIÓN: Si habla español, tiene a phelps disposición servicios gratuitos de asistencia lingüística. Llame al [phone].    We comply with applicable federal civil rights laws and [place] laws. We do not discriminate on the basis of race, color, national origin, age, disability, sex, sexual orientation, or gender identity.            Thank you!     Thank you for choosing [facility]   for your care. Our goal is always to provide you with excellent care. Hearing  back from our patients is one way we can continue to improve our services. Please take a few minutes to complete the written survey that you may receive in the mail after your visit with us. Thank you!             Your Updated Medication List - Protect others around you: Learn how to safely use, store and throw away your medicines at www.disposemymeds.org.          This list is accurate as of 6/20/18  6:33 PM.  Always use your most recent med list.                   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       * estradiol 0.1 MG/GM cream    ESTRACE VAGINAL    42.5 g    Place 2 g vaginally once a week    Menopause       * estradiol 0.1 MG/GM cream    ESTRACE VAGINAL    42.5 g    Place 2 g vaginally once a week    Menopause       fluticasone 50 MCG/ACT spray    FLONASE     Spray 2 sprays into both nostrils daily        ketoprofen 10% in PLO 10% topical gel     30 g    Apply a pea-sized amount to the CMC joint on the Right    CMC arthritis       magnesium 100 MG Caps           progesterone 100 MG    ENDOMETRIN    90 each    Take 100mg progesterone by mouth at bedtime    Menopause       triamcinolone 0.1 % cream    KENALOG    80 g    Apply sparingly to affected area three times daily as needed for 1-2 weeks    Eczema, unspecified type       vitamin B complex with vitamin C Tabs tablet      Take 1 tablet by mouth daily        * Notice:  This list has 2 medication(s) that are the same as other medications prescribed  for you. Read the directions carefully, and ask your doctor or other care provider to review them with you.

## 2018-07-25 ENCOUNTER — OFFICE VISIT (OUTPATIENT)
Dept: PSYCHOLOGY | Facility: CLINIC | Age: 53
End: 2018-07-25
Payer: COMMERCIAL

## 2018-07-25 DIAGNOSIS — F41.9 ANXIETY DISORDER, UNSPECIFIED TYPE: Primary | ICD-10-CM

## 2018-07-25 NOTE — MR AVS SNAPSHOT
After Visit Summary   7/25/2018    Nkechi Manrique    MRN: [id number]           Patient Information     Date Of Birth          1965        Visit Information        Provider Department      7/25/2018 10:00 AM Kellie Granados, PhD  [facility]         Today's Diagnoses     Anxiety disorder, unspecified type    -  1       Follow-ups after your visit        Your next 10 appointments already scheduled     Aug 15, 2018  2:00 PM CDT   Return Visit with Kellie Granados, PhD SHANNON   [facility]  ([facility])    [place] [facility]  [address]  [facility] [address]   [phone]            Aug 27, 2018  9:00 AM CDT   Return Visit with Kellie Granados, PhD    [facility]  ([facility])    [place] [facility]  [address]  [address]  [facility] [address]   [phone]            Oct 05, 2018  3:00 PM CDT   Return Visit with Kellie Granados, PhD    [facility]  ([facility])    [place] [facility]  [address]  [facility] [address]   [phone]            Oct 24, 2018  1:00 PM CDT   Return Visit with Kellie Granados, PhD    [facility]  ([facility])    [place] [facility]  [address]  [facility] [address]   [phone]            Nov 07, 2018  3:00 PM CST   Return Visit with Kellie Granados, PhD    [facility]  ([facility])    [place] [facility]  [address]  [facility] [address]   [phone]            Nov 27, 2018  8:00 AM CST   Return Visit with Kellie Granados, PhD    [facility]  ([facility])    [place] [facility]  [address]  [address]  [facility] [address]   [phone]               Who to contact     Please call your clinic at [phone] to:    Ask questions about your health    Make or cancel appointments    Discuss your medicines    Learn about your test results    Speak to your doctor            Additional Information About Your Visit        [employer] Information     [employer] gives you secure access to your electronic health record. If you see a primary care provider, you can also send messages to your care team and make appointments. If you have questions, please call your primary care clinic.  If you do not have a primary care provider, please call [phone] and they will assist you.      [employer] is an electronic gateway that provides easy, online access to your medical records. With [employer], you can request a clinic appointment, read your test results, renew a prescription or communicate with your care team.     To access your existing account, please contact your [facility] or call [phone] for assistance.        Care EveryWhere ID     This is your Care EveryWhere ID. This could be used by other organizations to access your [facility] medical records  [id number]        Your Vitals Were     Last Period                   10/16/2015 (Exact Date)            Blood Pressure from Last 3 Encounters:   08/11/17 121/77   06/28/17 110/72   11/06/15 117/68    Weight from Last 3 Encounters:   06/28/17 89 kg (196 lb 4.8 oz)   11/02/15 79.4 kg (175 lb)   10/28/14 79.4 kg (175 lb)              We Performed the Following     Individual Psychotherapy - Psychotherapy with pt and/or family present  60 mins [53+ mins] (36292)        Primary Care Provider Office Phone # Fax #    Dinorah Alonso [phone] [address] [address]        Equal Access to Services     KAREN DAVID : Janice Copeland, bell toth, key parra . [facility]  [phone].    ATENCIÓN: Si perico edmonds, tiene a phelps disposición servicios gratuitos de asistencia lingüística. Claire martini [phone].    We comply with applicable federal civil rights laws and [place] laws. We do not discriminate on the basis of race, color, national origin, age, disability, sex, sexual orientation, or gender identity.            Thank you!     Thank you for choosing [facility]   for your care. Our goal is always to provide you with excellent care. Hearing back from our patients is one way we can continue to improve our services. Please take a few minutes to complete the written survey that you may receive in the mail after your visit with us. Thank you!             Your Updated Medication List - Protect others around you: Learn how to safely use, store and throw away your medicines at www.disposemymeds.org.          This list is accurate as of 7/25/18 11:59 PM.  Always use your most recent med list.                   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       * estradiol 0.1 MG/GM cream    ESTRACE VAGINAL    42.5 g    Place 2 g vaginally once a week    Menopause       * estradiol 0.1 MG/GM cream    ESTRACE VAGINAL    42.5 g    Place 2 g vaginally once a week    Menopause       fluticasone 50 MCG/ACT spray    FLONASE     Spray 2 sprays into both nostrils daily        ketoprofen 10% in PLO 10% topical gel     30 g    Apply a pea-sized amount to the CMC joint on the  Right    CMC arthritis       magnesium 100 MG Caps           progesterone 100 MG    ENDOMETRIN    90 each    Take 100mg progesterone by mouth at bedtime    Menopause       triamcinolone 0.1 % cream    KENALOG    80 g    Apply sparingly to affected area three times daily as needed for 1-2 weeks    Eczema, unspecified type       vitamin B complex with vitamin C Tabs tablet      Take 1 tablet by mouth daily        * Notice:  This list has 2 medication(s) that are the same as other medications prescribed for you. Read the directions carefully, and ask your doctor or other care provider to review them with you.
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
## 2018-08-15 ENCOUNTER — OFFICE VISIT (OUTPATIENT)
Dept: PSYCHOLOGY | Facility: CLINIC | Age: 53
End: 2018-08-15
Attending: ADVANCED PRACTICE MIDWIFE
Payer: COMMERCIAL

## 2018-08-15 DIAGNOSIS — F41.9 ANXIETY DISORDER, UNSPECIFIED TYPE: Primary | ICD-10-CM
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
## 2018-08-15 NOTE — MR AVS SNAPSHOT
After Visit Summary   8/15/2018    Nkechi Manrique    MRN: [id number]           Patient Information     Date Of Birth          1965        Visit Information        Provider Department      8/15/2018 2:00 PM Kellie Granados, PhD  [facility]         Today's Diagnoses     Anxiety disorder, unspecified type    -  1       Follow-ups after your visit        Your next 10 appointments already scheduled     Aug 27, 2018  9:00 AM CDT   Return Visit with Kellie Granados, PhD SHANNON   [facility]  ([facility])    [facility]  [address]  [facility] [address]   [phone]            Sep 12, 2018  4:00 PM CDT   (Arrive by 3:45 PM)   New Patient Visit with Iris Cabral MD   [facility] ([facility])    [address] [place] [address]  [address]th [facility] [address]   [phone]            Oct 05, 2018  3:00 PM CDT   Return Visit with Kellie Granados,     [facility]  ([facility])    [facility]  [address]  [address]  [facility] [address]   [phone]            Oct 24, 2018  1:00 PM CDT   Return Visit with Kellie Granados, PhD    [facility]  ([facility])    [facility]  [address]  [address]  [facility] [address]   [phone]            Nov 07, 2018  3:00 PM CST   Return Visit with Kellie Granados, PhD    [facility]  ([facility])    [facility]  [address]  [address]  [facility] [address]   [phone]            Nov 27, 2018  8:00 AM CST   Return Visit with Kellie Granados, PhD    [facility]  ([facility])    [facility]  [address]  [address]  [facility] [address]   [phone]               Who to contact     Please call your clinic at [phone] to:    Ask questions about your health    Make or cancel appointments    Discuss your medicines    Learn about your test results    Speak to your doctor            Additional Information About Your Visit        [employer] Information     [employer] gives you secure access to your electronic health record. If you see a primary care provider, you can also send messages to your care team and make appointments. If you have questions, please call your primary care clinic.  If you do not have a primary care provider, please call [phone] and they will assist you.      [employer] is an electronic gateway that provides easy, online access to your medical records. With [employer], you can request a clinic appointment, read your test results, renew a prescription or communicate with your care team.     To access your existing account, please contact your [facility] or call [phone] for assistance.        Care EveryWhere ID     This is your Care EveryWhere ID. This could be used by other organizations to access your [facility] medical records  [id number]        Your Vitals Were     Last Period                   10/16/2015 (Exact Date)            Blood Pressure from Last 3 Encounters:   08/11/17 121/77   06/28/17 110/72   11/06/15 117/68    Weight from Last 3 Encounters:   06/28/17 89 kg (196 lb 4.8 oz)   11/02/15 79.4 kg (175 lb)   10/28/14 79.4 kg (175 lb)              We Performed the Following     Individual Psychotherapy - Psychotherapy with pt and/or family present  60 mins [53+ mins] (68267)        Primary Care Provider Office Phone # Fax #    Dinorah Alonso [phone] [address] [address]        Equal Access to Services     KAREN DAVID : Janice Copeland, bell toth, key parra . So [facility]  [phone].    ATENCIÓN: Si perico edmonds, tiene a phelps disposición servicios gratuitos de asistencia lingüística. Claire martini [phone].    We comply with applicable federal civil rights laws and [place] laws. We do not discriminate on the basis of race, color, national origin, age, disability, sex, sexual orientation, or gender identity.            Thank you!     Thank you for choosing [facility]   for your care. Our goal is always to provide you with excellent care. Hearing back from our patients is one way we can continue to improve our services. Please take a few minutes to complete the written survey that you may receive in the mail after your visit with us. Thank you!             Your Updated Medication List - Protect others around you: Learn how to safely use, store and throw away your medicines at www.disposemymeds.org.          This list is accurate as of 8/15/18  4:50 PM.  Always use your most recent med list.                   Brand Name Dispense Instructions for use Diagnosis    albuterol 108 (90 Base) MCG/ACT inhaler    PROAIR HFA    2 Inhaler    Inhale 1-2 puffs into the lungs every 6 hours as needed for shortness of breath / dyspnea    Mild persistent asthma without complication       CALCIUM & VIT D3 BONE HEALTH PO           CLARITIN PO      1 TABLET DAILY        EPINEPHrine 0.3 MG/0.3ML injection 2-pack    EPIPEN/ADRENACLICK/or ANY BX GENERIC EQUIV    0.3 mL    Inject 0.3 mLs (0.3 mg) into the muscle once as needed for anaphylaxis    Allergic reaction to bee sting       * estradiol 0.1 MG/GM cream    ESTRACE VAGINAL    42.5 g    Place 2 g vaginally once a week    Menopause       * estradiol 0.1 MG/GM cream    ESTRACE VAGINAL    42.5 g    Place 2 g vaginally once a week    Menopause       fluticasone 50 MCG/ACT spray    FLONASE     Spray 2 sprays into both nostrils daily        ketoprofen 10% in PLO 10% topical gel     30 g    Apply a pea-sized amount to the CMC joint on the  Right    CMC arthritis       magnesium 100 MG Caps           progesterone 100 MG    ENDOMETRIN    90 each    Take 100mg progesterone by mouth at bedtime    Menopause       triamcinolone 0.1 % cream    KENALOG    80 g    Apply sparingly to affected area three times daily as needed for 1-2 weeks    Eczema, unspecified type       vitamin B complex with vitamin C Tabs tablet      Take 1 tablet by mouth daily        * Notice:  This list has 2 medication(s) that are the same as other medications prescribed for you. Read the directions carefully, and ask your doctor or other care provider to review them with you.

## 2018-08-27 ENCOUNTER — OFFICE VISIT (OUTPATIENT)
Dept: PSYCHOLOGY | Facility: CLINIC | Age: 53
End: 2018-08-27
Payer: COMMERCIAL

## 2018-08-27 DIAGNOSIS — F41.9 ANXIETY DISORDER, UNSPECIFIED TYPE: Primary | ICD-10-CM

## 2018-08-27 NOTE — MR AVS SNAPSHOT
After Visit Summary   8/27/2018    Nkechi Manrique    MRN: [id number]           Patient Information     Date Of Birth          1965        Visit Information        Provider Department      8/27/2018 9:00 AM Kellie Granados, PhD  [facility]         Today's Diagnoses     Anxiety disorder, unspecified type    -  1       Follow-ups after your visit        Your next 10 appointments already scheduled     Sep 12, 2018  4:00 PM CDT   (Arrive by 3:45 PM)   New Patient Visit with Iris Cabral MD   [facility] ([facility])    [address] [place] [address]  [address]th [facility] [address]   [phone]            Oct 05, 2018  3:00 PM CDT   Return Visit with Kellie Granados, PhD    [facility]  ([facility])    [facility]  [address]  [address]  [facility] [address]   [phone]            Oct 24, 2018  1:00 PM CDT   Return Visit with Kellie Granados, PhD    [facility]  ([facility])    [facility]  [address]  [address]  [facility] [address]   [phone]            Nov 07, 2018  3:00 PM CST   Return Visit with Kellie Granados, PhD    [facility]  ([facility])    [facility]  [address]  [address]  [facility] [address]   [phone]            Nov 27, 2018  8:00 AM CST   Return Visit with Kellie Granados, PhD    [facility]  ([facility])    [facility]  [address]  [address]  [facility] [address] [phone]              Who to contact     Please call your clinic at [phone] to:    Ask questions about your health    Make or cancel appointments    Discuss your medicines    Learn about your test results    Speak to your doctor            Additional  Information About Your Visit        [employer] Information     [facility] gives you secure access to your electronic health record. If you see a primary care provider, you can also send messages to your care team and make appointments. If you have questions, please call your primary care clinic.  If you do not have a primary care provider, please call [phone] and they will assist you.      [facility] is an electronic gateway that provides easy, online access to your medical records. With [facility], you can request a clinic appointment, read your test results, renew a prescription or communicate with your care team.     To access your existing account, please contact your [facility] or call [phone] for assistance.        Care EveryWhere ID     This is your Care EveryWhere ID. This could be used by other organizations to access your [place] medical records  [id number]        Your Vitals Were     Last Period                   10/16/2015 (Exact Date)            Blood Pressure from Last 3 Encounters:   08/11/17 121/77   06/28/17 110/72   11/06/15 117/68    Weight from Last 3 Encounters:   06/28/17 89 kg (196 lb 4.8 oz)   11/02/15 79.4 kg (175 lb)   10/28/14 79.4 kg (175 lb)              We Performed the Following     Individual Psychotherapy - Psychotherapy with pt and/or family present  60 mins [53+ mins] ([id number])        Primary Care Provider Office Phone # Fax #    Dinorah Alonso [phone] [address] [address]        Equal Access to Services     KAREN DAVID : Hadii aad ku hadasho Soomaali, waaxda luqadaha, qaybta kaalmada adeegyada, waxay trevonin haymamadou tao . So [facility] [phone].    ATENCIÓN: Si habla español, tiene a phelps disposición servicios gratuitos de asistencia lingüística. Llame al [phone].    We comply with applicable federal civil rights laws and [place] laws. We do not discriminate on the basis  of race, color, national origin, age, disability, sex, sexual orientation, or gender identity.            Thank you!     Thank you for choosing [facility]   for your care. Our goal is always to provide you with excellent care. Hearing back from our patients is one way we can continue to improve our services. Please take a few minutes to complete the written survey that you may receive in the mail after your visit with us. Thank you!             Your Updated Medication List - Protect others around you: Learn how to safely use, store and throw away your medicines at www.disposemymeds.org.          This list is accurate as of 8/27/18 10:16 AM.  Always use your most recent med list.                   Brand Name Dispense Instructions for use Diagnosis    albuterol 108 (90 Base) MCG/ACT inhaler    PROAIR HFA    2 Inhaler    Inhale 1-2 puffs into the lungs every 6 hours as needed for shortness of breath / dyspnea    Mild persistent asthma without complication       CALCIUM & VIT D3 BONE HEALTH PO           CLARITIN PO      1 TABLET DAILY        EPINEPHrine 0.3 MG/0.3ML injection 2-pack    EPIPEN/ADRENACLICK/or ANY BX GENERIC EQUIV    0.3 mL    Inject 0.3 mLs (0.3 mg) into the muscle once as needed for anaphylaxis    Allergic reaction to bee sting       * estradiol 0.1 MG/GM cream    ESTRACE VAGINAL    42.5 g    Place 2 g vaginally once a week    Menopause       * estradiol 0.1 MG/GM cream    ESTRACE VAGINAL    42.5 g    Place 2 g vaginally once a week    Menopause       fluticasone 50 MCG/ACT spray    FLONASE     Spray 2 sprays into both nostrils daily        ketoprofen 10% in PLO 10% topical gel     30 g    Apply a pea-sized amount to the CMC joint on the Right    CMC arthritis       magnesium 100 MG Caps           progesterone 100 MG    ENDOMETRIN    90 each    Take 100mg progesterone by mouth at bedtime    Menopause       triamcinolone 0.1 % cream    KENALOG    80 g    Apply sparingly to affected  area three times daily as needed for 1-2 weeks    Eczema, unspecified type       vitamin B complex with vitamin C Tabs tablet      Take 1 tablet by mouth daily        * Notice:  This list has 2 medication(s) that are the same as other medications prescribed for you. Read the directions carefully, and ask your doctor or other care provider to review them with you.
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
## 2018-08-31 DIAGNOSIS — Z78.0 MENOPAUSE: ICD-10-CM

## 2018-08-31 RX ORDER — ESTRADIOL 0.1 MG/G
2 CREAM VAGINAL WEEKLY
Qty: 42.5 G | Refills: 0 | Status: SHIPPED | OUTPATIENT
Start: 2018-08-31 | End: 2018-10-10

## 2018-08-31 NOTE — TELEPHONE ENCOUNTER
Received refill request for estrace vaginal cream.  Last in clinic 8/2017.    Tried to reach Nkechi but received voicemail.  Left message that refill request was received and a one month supply can be sent to pharmacy but office visit is due for further refills. Please call [phone] to schedule.

## 2018-10-05 ENCOUNTER — OFFICE VISIT (OUTPATIENT)
Dept: PSYCHOLOGY | Facility: CLINIC | Age: 53
End: 2018-10-05
Payer: COMMERCIAL

## 2018-10-05 DIAGNOSIS — F41.9 ANXIETY DISORDER, UNSPECIFIED TYPE: Primary | ICD-10-CM

## 2018-10-05 NOTE — MR AVS SNAPSHOT
After Visit Summary   10/5/2018    Nkechi Manrique    MRN: [id number]           Patient Information     Date Of Birth          1965        Visit Information        Provider Department      10/5/2018 3:00 PM Kellie Granados, PhD  [facility]         Today's Diagnoses     Anxiety disorder, unspecified type    -  1       Follow-ups after your visit        Your next 10 appointments already scheduled     Oct 24, 2018  1:00 PM CDT   Return Visit with Kellie Granados, PhD SHANNON   [facility]  ([facility])    [place] [facility]  [address]  [facility] [address]   [phone]            Nov 07, 2018  3:00 PM CST   Return Visit with Kellie Granados, PhD SHANNON   [facility]  ([facility])    [place] [facility] [employer]  [address]  [address]  [facility] [address]   [phone]            Nov 27, 2018  8:00 AM CST   Return Visit with Kellie Granados, PhD    [facility]  ([facility])    [place] [employer] [facility]  [address]  [address]  [facility] [address] [phone]              Who to contact     Please call your clinic at [phone] to:    Ask questions about your health    Make or cancel appointments    Discuss your medicines    Learn about your test results    Speak to your doctor            Additional Information About Your Visit        [employer] Information     [employer] gives you secure access to your electronic health record. If you see a primary care provider, you can also send messages to your care team and make appointments. If you have questions, please call your primary care clinic.  If you do not have a primary care provider, please call [phone] and they will assist you.      [employer] is an electronic gateway that provides easy, online access to your medical records. With [employer], you can  request a clinic appointment, read your test results, renew a prescription or communicate with your care team.     To access your existing account, please contact your [facility] or call [phone] for assistance.        Care EveryWhere ID     This is your Care EveryWhere ID. This could be used by other organizations to access your [place] medical records  [id number]        Your Vitals Were     Last Period                   10/16/2015 (Exact Date)            Blood Pressure from Last 3 Encounters:   08/11/17 121/77   06/28/17 110/72   11/06/15 117/68    Weight from Last 3 Encounters:   06/28/17 89 kg (196 lb 4.8 oz)   11/02/15 79.4 kg (175 lb)   10/28/14 79.4 kg (175 lb)              We Performed the Following     Individual Psychotherapy - Psychotherapy with pt and/or family present  60 mins [53+ mins] (63432)        Primary Care Provider Office Phone # Fax #    Dinorah Alonso [phone] [address] [place] [phone]        Equal Access to Services     [facility]: Hadii aad ku hadasho Soomaali, waaxda luqadaha, qaybta kaalmada adedeviyajose, key tao . So [facility] [phone].    ATENCIÓN: Si habla español, tiene a phelps disposición servicios gratsivaos de asistencia lingüística. [facility] [phone].    We comply with applicable federal civil rights laws and [place] laws. We do not discriminate on the basis of race, color, national origin, age, disability, sex, sexual orientation, or gender identity.            Thank you!     Thank you for choosing [facility]   for your care. Our goal is always to provide you with excellent care. Hearing back from our patients is one way we can continue to improve our services. Please take a few minutes to complete the written survey that you may receive in the mail after your visit with us. Thank you!             Your Updated Medication List -  Protect others around you: Learn how to safely use, store and throw away your medicines at www.disposemymeds.org.          This list is accurate as of 10/5/18  4:24 PM.  Always use your most recent med list.                   Brand Name Dispense Instructions for use Diagnosis    albuterol 108 (90 Base) MCG/ACT inhaler    PROAIR HFA    2 Inhaler    Inhale 1-2 puffs into the lungs every 6 hours as needed for shortness of breath / dyspnea    Mild persistent asthma without complication       CALCIUM & VIT D3 BONE HEALTH PO           CLARITIN PO      1 TABLET DAILY        EPINEPHrine 0.3 MG/0.3ML injection 2-pack    EPIPEN/ADRENACLICK/or ANY BX GENERIC EQUIV    0.3 mL    Inject 0.3 mLs (0.3 mg) into the muscle once as needed for anaphylaxis    Allergic reaction to bee sting       * estradiol 0.1 MG/GM cream    ESTRACE VAGINAL    42.5 g    Place 2 g vaginally once a week    Menopause       * estradiol 0.1 MG/GM cream    ESTRACE VAGINAL    42.5 g    Place 2 g vaginally once a week    Menopause       fluticasone 50 MCG/ACT spray    FLONASE     Spray 2 sprays into both nostrils daily        ketoprofen 10% in PLO 10% topical gel     30 g    Apply a pea-sized amount to the CMC joint on the Right    CMC arthritis       magnesium 100 MG Caps           progesterone 100 MG    ENDOMETRIN    90 each    Take 100mg progesterone by mouth at bedtime    Menopause       triamcinolone 0.1 % cream    KENALOG    80 g    Apply sparingly to affected area three times daily as needed for 1-2 weeks    Eczema, unspecified type       vitamin B complex with vitamin C Tabs tablet      Take 1 tablet by mouth daily        * Notice:  This list has 2 medication(s) that are the same as other medications prescribed for you. Read the directions carefully, and ask your doctor or other care provider to review them with you.
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
## 2018-10-10 ENCOUNTER — MYC MEDICAL ADVICE (OUTPATIENT)
Dept: OBGYN | Facility: CLINIC | Age: 53
End: 2018-10-10

## 2018-10-10 DIAGNOSIS — Z78.0 MENOPAUSE: ICD-10-CM

## 2018-10-10 RX ORDER — ESTRADIOL 0.1 MG/G
2 CREAM VAGINAL WEEKLY
Qty: 42.5 G | Refills: 3 | Status: SHIPPED | OUTPATIENT
Start: 2018-10-10 | End: 2018-10-31

## 2018-10-11 ENCOUNTER — TELEPHONE (OUTPATIENT)
Dept: OBGYN | Facility: CLINIC | Age: 53
End: 2018-10-11

## 2018-10-11 NOTE — TELEPHONE ENCOUNTER
Prior Authorization Retail Medication Request    Medication/Dose: Progesterone 100mg  ICD code (if different than what is on RX):     Previously Tried and Failed:    Rationale:      Insurance Name:  [employer]  Insurance ID:  [id number]       Pharmacy Information (if different than what is on RX)  Name:    Phone:

## 2018-10-15 NOTE — TELEPHONE ENCOUNTER
-Central Prior Authorization Team   Phone: [phone]      PA Initiation    Medication: progesterone (ENDOMETRIN) 100 MG  Insurance Company: [employer] - Phone [phone] Fax [phone]  Pharmacy Filling the Rx: [place], [place] - [address]  Filling Pharmacy Phone: [phone]  Filling Pharmacy Fax: [phone]  Start Date: 10/15/2018

## 2018-10-18 NOTE — TELEPHONE ENCOUNTER
PRIOR AUTHORIZATION DENIED    Medication: progesterone (ENDOMETRIN) 100 MG- P/A DENIED    Denial Date: 10/18/2018    Denial Rational: Medication must be used for one of the following; 1) Treatment of Secondary Amenorrhea, Or 2) Prevention of Endometrial Hyperplasia in patients taking Hormone Therapy.        Appeal Information:

## 2018-10-18 NOTE — TELEPHONE ENCOUNTER
Called [facility] ([phone]), spoke to Guillermo,  to check status of P/A request. Says it didn't meet criteria for approval, so it was sent to their clinical review team for futher review. Should have a determination by Monday 10/22.

## 2018-10-22 DIAGNOSIS — Z78.0 MENOPAUSE: ICD-10-CM

## 2018-10-22 DIAGNOSIS — N95.1 SYMPTOMATIC MENOPAUSAL OR FEMALE CLIMACTERIC STATES: Primary | ICD-10-CM

## 2018-10-22 RX ORDER — ESTRADIOL 0.1 MG/G
2 CREAM VAGINAL WEEKLY
Qty: 42.5 G | Refills: 3 | Status: SHIPPED | OUTPATIENT
Start: 2018-10-22 | End: 2019-09-09

## 2018-10-24 ENCOUNTER — OFFICE VISIT (OUTPATIENT)
Dept: PSYCHOLOGY | Facility: CLINIC | Age: 53
End: 2018-10-24
Payer: COMMERCIAL

## 2018-10-24 DIAGNOSIS — F41.9 ANXIETY DISORDER, UNSPECIFIED TYPE: Primary | ICD-10-CM
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
## 2018-10-24 NOTE — MR AVS SNAPSHOT
After Visit Summary   10/24/2018    Nkechi Manrique    MRN: [id number]           Patient Information     Date Of Birth          1965        Visit Information        Provider Department      10/24/2018 1:00 PM Kellie Granados, PhD  [facility]         Today's Diagnoses     Anxiety disorder, unspecified type    -  1       Follow-ups after your visit        Your next 10 appointments already scheduled     Oct 31, 2018  1:45 PM CDT   Annual Visit with Elaina Bullard MD   [facility] ([facility])    [facility] [address]  [address]  [address]  [facility] [address]   [phone]            Nov 07, 2018  3:00 PM CST   Return Visit with Kellie Granados, PhD    [facility]  ([facility])    [facility]  [address]  [address]  [facility] [address]   [phone]            Nov 27, 2018  8:00 AM CST   Return Visit with Kellie Granados, PhD    [facility]  ([facility])    [facility]  [address]  [address]  [facility] [address] [phone]              Who to contact     Please call your clinic at [phone] to:    Ask questions about your health    Make or cancel appointments    Discuss your medicines    Learn about your test results    Speak to your doctor            Additional Information About Your Visit        [employer] Information     [employer] gives you secure access to your electronic health record. If you see a primary care provider, you can also send messages to your care team and make appointments. If you have questions, please call your primary care clinic.  If you do not have a primary care provider, please call [phone] and they will assist you.      [employer] is an electronic gateway that provides easy, online access to your medical records. With [employer], you  can request a clinic appointment, read your test results, renew a prescription or communicate with your care team.     To access your existing account, please contact your [facility] or call [phone] for assistance.        Care EveryWhere ID     This is your Care EveryWhere ID. This could be used by other organizations to access your [place] medical records  [id number]        Your Vitals Were     Last Period                   10/16/2015 (Exact Date)            Blood Pressure from Last 3 Encounters:   08/11/17 121/77   06/28/17 110/72   11/06/15 117/68    Weight from Last 3 Encounters:   06/28/17 89 kg (196 lb 4.8 oz)   11/02/15 79.4 kg (175 lb)   10/28/14 79.4 kg (175 lb)              We Performed the Following     Individual Psychotherapy - Psychotherapy with pt and/or family present  60 mins [53+ mins] (36077)        Primary Care Provider Office Phone # Fax #    Dinorah Alonso [phone] [address] [address]        Equal Access to Services     [facility]: Hadii aad ku hadasho Soomaali, waaxda luqadaha, qaybta kaalmada ademelly, key tao . So [facility] [phone].    ATENCIÓN: Si habla español, tiene a phelps disposición servicios gratsivaos de asistencia lingüística. [facility] [phone].    We comply with applicable federal civil rights laws and [place] laws. We do not discriminate on the basis of race, color, national origin, age, disability, sex, sexual orientation, or gender identity.            Thank you!     Thank you for choosing [facility]   for your care. Our goal is always to provide you with excellent care. Hearing back from our patients is one way we can continue to improve our services. Please take a few minutes to complete the written survey that you may receive in the mail after your visit with us. Thank you!             Your Updated Medication List -  Protect others around you: Learn how to safely use, store and throw away your medicines at www.disposemymeds.org.          This list is accurate as of 10/24/18  5:05 PM.  Always use your most recent med list.                   Brand Name Dispense Instructions for use Diagnosis    albuterol 108 (90 Base) MCG/ACT inhaler    PROAIR HFA    2 Inhaler    Inhale 1-2 puffs into the lungs every 6 hours as needed for shortness of breath / dyspnea    Mild persistent asthma without complication       CALCIUM & VIT D3 BONE HEALTH PO           CLARITIN PO      1 TABLET DAILY        EPINEPHrine 0.3 MG/0.3ML injection 2-pack    EPIPEN/ADRENACLICK/or ANY BX GENERIC EQUIV    0.3 mL    Inject 0.3 mLs (0.3 mg) into the muscle once as needed for anaphylaxis    Allergic reaction to bee sting       * estradiol 0.1 MG/GM cream    ESTRACE VAGINAL    42.5 g    Place 2 g vaginally once a week    Menopause       * estradiol 0.1 MG/GM cream    ESTRACE VAGINAL    42.5 g    Place 2 g vaginally once a week    Menopause       fluticasone 50 MCG/ACT spray    FLONASE     Spray 2 sprays into both nostrils daily        ketoprofen 10% in PLO 10% topical gel     30 g    Apply a pea-sized amount to the CMC joint on the Right    CMC arthritis       magnesium 100 MG Caps           progesterone 100 MG    ENDOMETRIN    90 each    Take 100mg progesterone by mouth at bedtime    Menopause       PROGESTERONE 100MG CAPSULES COMPOUND     180 capsule    1-2 capsules by mouth every night at bedtime    Symptomatic menopausal or female climacteric states       triamcinolone 0.1 % cream    KENALOG    80 g    Apply sparingly to affected area three times daily as needed for 1-2 weeks    Eczema, unspecified type       vitamin B complex with vitamin C Tabs tablet      Take 1 tablet by mouth daily        * Notice:  This list has 2 medication(s) that are the same as other medications prescribed for you. Read the directions carefully, and ask your doctor or other care provider  to review them with you.

## 2018-10-31 ENCOUNTER — OFFICE VISIT (OUTPATIENT)
Dept: OBGYN | Facility: CLINIC | Age: 53
End: 2018-10-31
Attending: OBSTETRICS & GYNECOLOGY
Payer: COMMERCIAL

## 2018-10-31 VITALS
HEIGHT: 68 IN | SYSTOLIC BLOOD PRESSURE: 118 MMHG | BODY MASS INDEX: 29.85 KG/M2 | DIASTOLIC BLOOD PRESSURE: 78 MMHG | HEART RATE: 77 BPM

## 2018-10-31 DIAGNOSIS — Z12.4 SCREENING FOR MALIGNANT NEOPLASM OF CERVIX: ICD-10-CM

## 2018-10-31 DIAGNOSIS — Z01.419 ENCOUNTER FOR GYNECOLOGICAL EXAMINATION WITHOUT ABNORMAL FINDING: ICD-10-CM

## 2018-10-31 DIAGNOSIS — Z00.00 VISIT FOR PREVENTIVE HEALTH EXAMINATION: Primary | ICD-10-CM

## 2018-10-31 PROCEDURE — G0463 HOSPITAL OUTPT CLINIC VISIT: HCPCS | Mod: 25

## 2018-10-31 PROCEDURE — 90750 HZV VACC RECOMBINANT IM: CPT | Mod: ZF

## 2018-10-31 PROCEDURE — 87624 HPV HI-RISK TYP POOLED RSLT: CPT | Performed by: OBSTETRICS & GYNECOLOGY

## 2018-10-31 PROCEDURE — 90471 IMMUNIZATION ADMIN: CPT | Mod: ZF

## 2018-10-31 PROCEDURE — 25000581 ZZH RX MED A9270 GY (STAT IND- M) 250: Mod: ZF

## 2018-10-31 PROCEDURE — G0145 SCR C/V CYTO,THINLAYER,RESCR: HCPCS | Performed by: OBSTETRICS & GYNECOLOGY

## 2018-10-31 NOTE — LETTER
10/31/2018       RE: Nkechi Manrique  [address] [facility] [address]     Dear Colleague,    Thank you for referring your patient, Nkechi Manrique, to the [facility] at [facility]. Please see a copy of my visit note below.    SUBJECTIVE   Nkechi Manrique is a 52 year old , Patient's last menstrual period was 10/16/2015 (exact date)., here for an annual preventive exam.    Specific concerns today:  -- wondering about shingles vaccine  -- update pap    Gynecologic History  Patient's last menstrual period was 10/16/2015 (exact date).   Menstrual History:  Menstrual History 2013 10/28/2014 2015   LAST MENSTRUAL PERIOD 2013 2014 10/16/2015     Current contraception: menopause    Lab Results   Component Value Date    PAP NIL 2013      History of abnormal Pap smear: Yes: remote, not recent, no LEEP/CKC/cryo    Obstetric History  Obstetric History       T0      L0     SAB0   TAB0   Ectopic0   Multiple0   Live Births0            Health Maintenance  Immunization History   Administered Date(s) Administered     HEPA 1999, 2004     HepB 1994     Influenza Vaccine IM 3yrs+ 4 Valent IIV4 2013, 10/28/2014     Influenza Vaccine, 3 YRS +, IM (QUADRIVALENT W/PRESERVATIVES) 10/12/2015     Pneumococcal 23 valent 2011     TD (ADULT, 7+) 1971, 2003     TDAP Vaccine (Adacel) 2007     Typhoid Oral 2004     Varicella Pt Report Hx of Varicella/Chicken Pox 1965     Yellow Fever 2004     Health Maintenance   Topic Date Due     HIV SCREEN (SYSTEM ASSIGNED)  1982     ASTHMA CONTROL TEST Q6 MOS  2016     PAP Q3 YR  2016     ASTHMA ACTION PLAN Q1 YR  2016     TETANUS IMMUNIZATION (SYSTEM ASSIGNED)  2017     PHQ-2 Q1 YR  2018     INFLUENZA VACCINE (1) 2018     MAMMO Q1 YR  2019     LIPID SCREEN Q5 YR FEMALE (SYSTEM  ASSIGNED)  10/28/2019     COLON CANCER SCREEN (SYSTEM ASSIGNED)  10/26/2025     HEPATITIS C SCREENING  Completed     Lab Results   Component Value Date    CHOL 212 10/28/2014     Lab Results   Component Value Date     10/28/2014     Lab Results   Component Value Date    LDL 93 10/28/2014     Lab Results   Component Value Date    TSH 1.66 06/29/2017       Colonoscopy 10/2015  Mammogram 2/2018    Past Medical History  Past Medical History:   Diagnosis Date     Abnormal Pap smear 1990    Biopsy- ok     Blood dyscrasia     Factor 5 Leiden HTZ     Breast lump 9/27/2013     CMC arthritis, thumb, degenerative 11/3/2015     Congenital deficiency of other clotting factors     Factor V Leiden positive, had superficial thrombus, no DVT     Mild intermittent asthma with exacerbation     seasonally related, rare albuterol     Plantar fascia syndrome     L ft in '10, R ft in '11     Seasonal allergic rhinitis     claritin spring/August     Stress fracture of lower leg ~2002, 2009     Varicosities 2015    Varicose veins removed       Past Surgical History  Past Surgical History:   Procedure Laterality Date     BIOPSY  1998 2000 2017    Skin tags moles cysts     COLONOSCOPY  2015    Polyp removed     PHLEBECTOMY MULTIPLE STAB  10/15    [place] [facility], Dr. Ramirez       Medications  Current Outpatient Prescriptions   Medication     CLARITIN OR     estradiol (ESTRACE VAGINAL) 0.1 MG/GM cream     fluticasone (FLONASE) 50 MCG/ACT nasal spray     PROGESTERONE 100MG CAPSULES COMPOUND     RaNITidine HCl (ZANTAC PO)     vitamin B complex with vitamin C (VITAMIN  B COMPLEX) TABS tablet     VITAMIN D, CHOLECALCIFEROL, PO     albuterol (ALBUTEROL) 108 (90 BASE) MCG/ACT inhaler     EPINEPHrine 0.3 MG/0.3ML injection     [DISCONTINUED] COMPRESSION STOCKINGS     No current facility-administered medications for this visit.        Allergies  Allergies   Allergen Reactions     Septra [Bactrim] Hives     Wasps [Hornets] Swelling     Significant leg  swelling- epi pen rec in case worsening sx's       Social History  Social History     Social History     Marital status: Single     Spouse name: N/A     Number of children: N/A     Years of education: N/A     Occupational History      [employer]     Birthplace     Social History Main Topics     Smoking status: Never Smoker     Smokeless tobacco: Never Used     Alcohol use 0.6 oz/week      Comment: 2 drinks per week     Drug use: No     Sexual activity: Yes     Partners: Male     Birth control/ protection: Post-menopausal, Male Surgical     Other Topics Concern     Not on file     Social History Narrative    Caffeine intake/servings daily - 2-3    Calcium intake/servings daily - 2-3    Exercise 6-7 times weekly - describe biking, swimming, wts    Sunscreen used - Yes    Seatbelts used - Yes    Guns stored in the home - No    Self Breast Exam - No    Pap test up to date -  Yes    Eye exam up to date -  Yes    Dental exam up to date -  Yes    DEXA scan up to date -  Not Applicable    Flex Sig/Colonoscopy up to date -  Not Applicable    Mammography up to date -  Yes    Immunizations reviewed and up to date - Yes    Abuse: Current or Past (Physical, Sexual or Emotional) - No    Do you feel safe in your environment - Yes    Do you cope well with stress - Yes    Do you suffer from insomnia - No    Rosy Lam LPN      '10                                 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 depression, schizophrenia     Other Cancer Brother      Pancreatic     Genetic Disorder Brother      Autoimmune  "IGG     OBJECTIVE   /78  Pulse 77  Ht 1.727 m (5' 8\")  LMP 10/16/2015 (Exact Date)  BMI 29.85 kg/m2  BMI: Body mass index is 29.85 kg/(m^2).  General:  Alert, no distress   HEENT:  Normocephalic, without obvious abnormality  No thyromegaly   Breasts: Within normal limits bilaterally, no LAD   Abdomen:  Soft, non-tender, non-distended, bowel sounds normal   Pelvic: -nefg  -vagina normal without discharge  -cervix normal in appearance, no masses  -uterus small, mobile, NT  -no adnexal masses, NT  -anus, perineum wnl   Extremities:  normal       ASSESSMENT   Nkechi Manrique is a 52 year old G0, annual preventive exam within normal limits.    PLAN   Age 40-64 Annual Preventive Exam  1.  Screening   Cervical cancer: cotesting today   CBE and Mammography yearly: UTD   Colorectal cancer - UTD   Diabetes - due 2020   HIV based on RF - declines   Cholesterol and triglycerides - due 2019 Thyroid - due 2022    2.  Immunizations   UTD, Shingrix today    3.  Patient Education   a.  Additional teaching done at this visit included: calcium (1200 mg per day), exercise and menopause   b.  Discussed with patient risks/ benefits and treatment options of prescribed medications or other treatment modalities.    4. Anxiety: cont psychotherapy with Dr. Granados    RTFERMIN in one year for annual exam or with concerns.    Elaina Garcia MD MPH      "

## 2018-10-31 NOTE — PROGRESS NOTES
SUBJECTIVE   Nkechi Manrique is a 52 year old , Patient's last menstrual period was 10/16/2015 (exact date)., here for an annual preventive exam.    Specific concerns today:  -- wondering about shingles vaccine  -- update pap    Gynecologic History  Patient's last menstrual period was 10/16/2015 (exact date).   Menstrual History:  Menstrual History 2013 10/28/2014 2015   LAST MENSTRUAL PERIOD 2013 2014 10/16/2015     Current contraception: menopause    Lab Results   Component Value Date    PAP NIL 2013      History of abnormal Pap smear: Yes: remote, not recent, no LEEP/CKC/cryo    Obstetric History  Obstetric History       T0      L0     SAB0   TAB0   Ectopic0   Multiple0   Live Births0            Health Maintenance  Immunization History   Administered Date(s) Administered     HEPA 1999, 2004     HepB 1994     Influenza Vaccine IM 3yrs+ 4 Valent IIV4 2013, 10/28/2014     Influenza Vaccine, 3 YRS +, IM (QUADRIVALENT W/PRESERVATIVES) 10/12/2015     Pneumococcal 23 valent 2011     TD (ADULT, 7+) 1971, 2003     TDAP Vaccine (Adacel) 2007     Typhoid Oral 2004     Varicella Pt Report Hx of Varicella/Chicken Pox 1965     Yellow Fever 2004     Health Maintenance   Topic Date Due     HIV SCREEN (SYSTEM ASSIGNED)  1982     ASTHMA CONTROL TEST Q6 MOS  2016     PAP Q3 YR  2016     ASTHMA ACTION PLAN Q1 YR  2016     TETANUS IMMUNIZATION (SYSTEM ASSIGNED)  2017     PHQ-2 Q1 YR  2018     INFLUENZA VACCINE (1) 2018     MAMMO Q1 YR  2019     LIPID SCREEN Q5 YR FEMALE (SYSTEM ASSIGNED)  10/28/2019     COLON CANCER SCREEN (SYSTEM ASSIGNED)  10/26/2025     HEPATITIS C SCREENING  Completed     Lab Results   Component Value Date    CHOL 212 10/28/2014     Lab Results   Component Value Date     10/28/2014     Lab Results   Component Value Date    LDL 93 10/28/2014     Lab  Results   Component Value Date    TSH 1.66 06/29/2017       Colonoscopy 10/2015  Mammogram 2/2018    Past Medical History  Past Medical History:   Diagnosis Date     Abnormal Pap smear 1990    Biopsy- ok     Blood dyscrasia     Factor 5 Leiden HTZ     Breast lump 9/27/2013     CMC arthritis, thumb, degenerative 11/3/2015     Congenital deficiency of other clotting factors     Factor V Leiden positive, had superficial thrombus, no DVT     Mild intermittent asthma with exacerbation     seasonally related, rare albuterol     Plantar fascia syndrome     L ft in '10, R ft in '11     Seasonal allergic rhinitis     claritin spring/August     Stress fracture of lower leg ~2002, 2009     Varicosities 2015    Varicose veins removed       Past Surgical History  Past Surgical History:   Procedure Laterality Date     BIOPSY  1998 2000 2017    Skin tags moles cysts     COLONOSCOPY  2015    Polyp removed     PHLEBECTOMY MULTIPLE STAB  10/15    [place] [facility], Dr. Ramirez       Medications  Current Outpatient Prescriptions   Medication     CLARITIN OR     estradiol (ESTRACE VAGINAL) 0.1 MG/GM cream     fluticasone (FLONASE) 50 MCG/ACT nasal spray     PROGESTERONE 100MG CAPSULES COMPOUND     RaNITidine HCl (ZANTAC PO)     vitamin B complex with vitamin C (VITAMIN  B COMPLEX) TABS tablet     VITAMIN D, CHOLECALCIFEROL, PO     albuterol (ALBUTEROL) 108 (90 BASE) MCG/ACT inhaler     EPINEPHrine 0.3 MG/0.3ML injection     [DISCONTINUED] COMPRESSION STOCKINGS     No current facility-administered medications for this visit.        Allergies  Allergies   Allergen Reactions     Septra [Bactrim] Hives     Wasps [Hornets] Swelling     Significant leg swelling- epi pen rec in case worsening sx's       Social History  Social History     Social History     Marital status: Single     Spouse name: N/A     Number of children: N/A     Years of education: N/A     Occupational History      [employer]     Birthplace     Social History Main  Topics     Smoking status: Never Smoker     Smokeless tobacco: Never Used     Alcohol use 0.6 oz/week      Comment: 2 drinks per week     Drug use: No     Sexual activity: Yes     Partners: Male     Birth control/ protection: Post-menopausal, Male Surgical     Other Topics Concern     Not on file     Social History Narrative    Caffeine intake/servings daily - 2-3    Calcium intake/servings daily - 2-3    Exercise 6-7 times weekly - describe biking, swimming, wts    Sunscreen used - Yes    Seatbelts used - Yes    Guns stored in the home - No    Self Breast Exam - No    Pap test up to date -  Yes    Eye exam up to date -  Yes    Dental exam up to date -  Yes    DEXA scan up to date -  Not Applicable    Flex Sig/Colonoscopy up to date -  Not Applicable    Mammography up to date -  Yes    Immunizations reviewed and up to date - Yes    Abuse: Current or Past (Physical, Sexual or Emotional) - No    Do you feel safe in your environment - Yes    Do you cope well with stress - Yes    Do you suffer from insomnia - No    Rosy Lam LPN      '10                                 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 depression, schizophrenia     Other Cancer Brother      Pancreatic     Genetic Disorder Brother      Autoimmune IGG     Review of Systems  CONSTITUTIONAL: NEGATIVE for fever, chills  EYES: NEGATIVE for vision changes   RESP: NEGATIVE for significant cough or SOB  CV: NEGATIVE for chest pain, palpitations   GI: NEGATIVE for nausea, abdominal pain, heartburn, or change in bowel habits  : NEGATIVE for frequency,  "dysuria, or hematuria  MUSCULOSKELETAL: NEGATIVE for significant arthralgias or myalgia  NEURO: NEGATIVE for weakness, dizziness or paresthesias or headache    OBJECTIVE   /78  Pulse 77  Ht 1.727 m (5' 8\")  LMP 10/16/2015 (Exact Date)  BMI 29.85 kg/m2  BMI: Body mass index is 29.85 kg/(m^2).  General:  Alert, no distress   HEENT:  Normocephalic, without obvious abnormality  No thyromegaly   Breasts: Within normal limits bilaterally, no LAD   Abdomen:  Soft, non-tender, non-distended, bowel sounds normal   Pelvic: -nefg  -vagina normal without discharge  -cervix normal in appearance, no masses  -uterus small, mobile, NT  -no adnexal masses, NT  -anus, perineum wnl   Extremities:  normal       ASSESSMENT   Nkechi Manrique is a 52 year old G0, annual preventive exam within normal limits.    PLAN   Age 40-64 Annual Preventive Exam  1.  Screening   Cervical cancer: cotesting today   CBE and Mammography yearly: UTD   Colorectal cancer - UTD   Diabetes - due 2020   HIV based on RF - declines   Cholesterol and triglycerides - due 2019 Thyroid - due 2022    2.  Immunizations   UTD, Shingrix today    3.  Patient Education   a.  Additional teaching done at this visit included: calcium (1200 mg per day), exercise and menopause   b.  Discussed with patient risks/ benefits and treatment options of prescribed medications or other treatment modalities.    4. Anxiety: cont psychotherapy with Dr. Darwin GAFFNEY in one year for annual exam or with concerns.    Elaina Garcia MD MPH      "

## 2018-10-31 NOTE — MR AVS SNAPSHOT
After Visit Summary   10/31/2018    Nkechi Manrique    MRN: [id number]           Patient Information     Date Of Birth          1965        Visit Information        Provider Department      10/31/2018 1:45 PM Elaina Joel MD [facility]        Today's Diagnoses     Visit for preventive health examination    -  1    Screening for malignant neoplasm of cervix        Encounter for gynecological examination without abnormal finding          Care Instructions      PREVENTIVE HEALTH RECOMMENDATIONS:   Most women need a yearly breast and pelvic exam.    A PAP screen, a test done DURING a pelvic exam, is NO longer recommended yearly.    March 2013, screening guidelines recommended by ACOG for PAP screen are:    1) First pap at age 21.    2) Pap every 3 years until age 30.    3) After age 30, pap every 3 years or Pap with HR HPV screen every 5 years until age 65.  4) Women do NOT need a vaginal Pap screen after a hysterectomy (surgical removal of the uterus) when they have not had cancer.    Exceptions:  1) Yearly pap if HIV+ or immunosuppressed secondary to organ transplant  2) JAYSON II-III continue routine screening for 20 years.    I encourage you continue looking for opportunities to choose a healthy lifestyle:       * Choose to eat a heart healthy diet. Check out the FOOD PLATE guidelines at: http://www.choosemyplate.gov/ for helpful hints on weight and cholesterol management.  Balance your caloric intake with exercise to maintain a BMI in the 22 to 26 range. For bone health: Eat calcium-rich foods like yogurt, broccoli or take chewable calcium pills (500 to 600 mg) twice a day with food.       * Exercise for at least an average of 30 minutes a day, 5 days of the week. This will help you control your weight, release stress, and help prevent disease.      * Take a Vitamin D3 supplement daily fall through spring and during summer unless you [id number]' full body sun  exposure to skin without sunscreen.      * DO wear sunscreen to prevent skin cancer after the first 15-30 minutes.      * Identify stressors in your life, find ways to release the stress, and, make time for yourself. PLEASE ask for help if mood changes last longer than two weeks.     * Limit alcohol to one drink per day.  No smoking.  Avoid second hand smoke. If you smoke, ask for help to stop.       *  If you are in a sexual relationship, talk with your partner about possible infection risks and take action to protect yourself from exposure to a sexual infection.    Please request an infection screen for STIs (sexually transmitted infections) if you are less than age 26 OR believe that you may be at risk.     Get a flu shot each year. Get a tetanus shot every 10 years. EVERYONE needs a pertussis (Whooping cough) booster.    See your dentist twice a year for an exam and preventive care cleaning.     Consider the following screen tests:    1) cholesterol test every 5 years.     2) yearly mammogram after age 40 unless you have identified risks.    3) colonoscopy every 10 years after age 50 unless you have identified risks.    4) diabetes blood test screening if you are at risk for diabetes.      Additional information that you may also find helpful:  The Internet now gives us access to LOTS of information -- some of it helpful, research documented and also plenty of harmful, anecdotal information that may not pertain to your situtaion. Consider visiting the following websites for accurate health information:    www.vitamindcouncil.org/ : Info and ongoing research re Vitamin D    www.fairview.org : Up to date and easily searchable information on multiple topics.    www.medlineplus.gov : medication info, interactive tutorials, watch real surgeries online    www.cdc.gov : public health info, travel advisories, epidemics (H1N1)    www.fady/std.org: current research re diagnosis, treatment and prevention of sexually  contacted infections.    www.health.state.[place] : [place] dept of heat, public health issues in [place], N1N1    www.familydoctor.org : good info from the Academy of Family Physicians                Follow-ups after your visit        Follow-up notes from your care team     Return in 1 year (on 10/31/2019) for Preventative Health Visit.      Your next 10 appointments already scheduled     Nov 07, 2018  3:00 PM CST   Return Visit with Kellie Granados PhD SHANNON   [facility]  ([facility])    [facility]  [address]  [facility] [id number] [phone]            Nov 27, 2018  8:00 AM CST   Return Visit with Kellie Granados, PhD SHANNON   [facility]  ([facility])    [facility]  [address]  [address]  [facility] [id number] [phone]            Jan 11, 2019  3:00 PM CST   Return Visit with Kellie Granados, PhD    [facility]  ([facility])    [facility]  [address]  [address]  [facility] [id number] [phone]            Jan 25, 2019 11:00 AM CST   Return Visit with Kellie Granados, PhD    [facility]  ([facility])    [facility]  [address]  [address]  [facility] [address] [phone]            Feb 06, 2019  3:00 PM CST   Return Visit with Kellie Granados, PhD    [facility]  ([facility])    [facility]  [address]  [address]  [facility] [address] [phone]            Feb 20, 2019  3:00 PM CST   Return Visit with Kellie Granados, PhD    [facility]  ([facility])    [facility]  [address]  [address]  [facility] [address] [phone]              Who to contact     Please call your clinic at [phone]  "to:    Ask questions about your health    Make or cancel appointments    Discuss your medicines    Learn about your test results    Speak to your doctor            Additional Information About Your Visit        [employer] Information     [email or web address] gives you secure access to your electronic health record. If you see a primary care provider, you can also send messages to your care team and make appointments. If you have questions, please call your primary care clinic.  If you do not have a primary care provider, please call [phone] and they will assist you.      [email or web address] is an electronic gateway that provides easy, online access to your medical records. With [email or web address], you can request a clinic appointment, read your test results, renew a prescription or communicate with your care team.     To access your existing account, please contact your [facility] or call [phone] for assistance.        Care EveryWhere ID     This is your Care EveryWhere ID. This could be used by other organizations to access your [place] medical records  [id number]        Your Vitals Were     Pulse Height Last Period BMI (Body Mass Index)          77 1.727 m (5' 8\") 10/16/2015 (Exact Date) 29.85 kg/m2         Blood Pressure from Last 3 Encounters:   10/31/18 118/78   08/11/17 121/77   06/28/17 110/72    Weight from Last 3 Encounters:   06/28/17 89 kg (196 lb 4.8 oz)   11/02/15 79.4 kg (175 lb)   10/28/14 79.4 kg (175 lb)              We Performed the Following     HPV High Risk Types DNA Cervical     Pap imaged thin layer screen with HPV - recommended age 30 - 65 years (select HPV order below)     Pap Smear Exam [] Do Not Remove     Pelvic and Breast Exam Procedure []     ZOSTER VACCINE RECOMBINANT ADJUVANTED IM NJX          Today's Medication Changes          These changes are accurate as of 10/31/18  4:10 PM.  If you have any questions, ask your nurse or doctor.               These medicines have " changed or have updated prescriptions.        Dose/Directions    estradiol 0.1 MG/GM cream   Commonly known as:  ESTRACE VAGINAL   This may have changed:  Another medication with the same name was removed. Continue taking this medication, and follow the directions you see here.   Used for:  Menopause   Changed by:  Elaina Joel MD        Dose:  2 g   Place 2 g vaginally once a week   Quantity:  42.5 g   Refills:  3       vitamin B complex with vitamin C Tabs tablet   This may have changed:  Another medication with the same name was removed. Continue taking this medication, and follow the directions you see here.   Changed by:  Elaina Joel MD        Dose:  1 tablet   Take 1 tablet by mouth daily   Refills:  0         Stop taking these medicines if you haven't already. Please contact your care team if you have questions.     CALCIUM & VIT D3 BONE HEALTH PO   Stopped by:  Elaina Joel MD           ketoprofen 10% in PLO 10% topical gel   Stopped by:  Elaina Joel MD           magnesium 100 MG Caps   Stopped by:  Elaina Joel MD           progesterone 100 MG   Commonly known as:  ENDOMETRIN   Stopped by:  Elaina Joel MD           triamcinolone 0.1 % cream   Commonly known as:  KENALOG   Stopped by:  Elaina Joel MD                    Primary Care Provider Office Phone # Fax #    Dinorah Alonso [phone] [address] [id number]        Equal Access to Services     [facility] AH: Hadii aad ku hadasho Soomaali, waaxda luqadaha, qaybta kaalmada adeegyada, waxay lavon tao . So [facility] [phone].    ATENCIÓN: Si habla español, tiene a phelps disposición servicios gratuitos de asistencia lingüística. Llame al [phone].    We comply with applicable federal civil rights laws and [place] laws. We do not discriminate  on the basis of race, color, national origin, age, disability, sex, sexual orientation, or gender identity.            Thank you!     Thank you for choosing [facility]  for your care. Our goal is always to provide you with excellent care. Hearing back from our patients is one way we can continue to improve our services. Please take a few minutes to complete the written survey that you may receive in the mail after your visit with us. Thank you!             Your Updated Medication List - Protect others around you: Learn how to safely use, store and throw away your medicines at www.disposemymeds.org.          This list is accurate as of 10/31/18  4:10 PM.  Always use your most recent med list.                   Brand Name Dispense Instructions for use Diagnosis    albuterol 108 (90 Base) MCG/ACT inhaler    PROAIR HFA    2 Inhaler    Inhale 1-2 puffs into the lungs every 6 hours as needed for shortness of breath / dyspnea    Mild persistent asthma without complication       CLARITIN PO      1 TABLET DAILY        EPINEPHrine 0.3 MG/0.3ML injection 2-pack    EPIPEN/ADRENACLICK/or ANY BX GENERIC EQUIV    0.3 mL    Inject 0.3 mLs (0.3 mg) into the muscle once as needed for anaphylaxis    Allergic reaction to bee sting       estradiol 0.1 MG/GM cream    ESTRACE VAGINAL    42.5 g    Place 2 g vaginally once a week    Menopause       fluticasone 50 MCG/ACT spray    FLONASE     Spray 2 sprays into both nostrils daily        PROGESTERONE 100MG CAPSULES COMPOUND     180 capsule    1-2 capsules by mouth every night at bedtime    Symptomatic menopausal or female climacteric states       vitamin B complex with vitamin C Tabs tablet      Take 1 tablet by mouth daily        VITAMIN D (CHOLECALCIFEROL) PO      Take by mouth daily        ZANTAC PO

## 2018-10-31 NOTE — PATIENT INSTRUCTIONS

## 2018-11-02 LAB
COPATH REPORT: NORMAL
PAP: NORMAL

## 2018-11-05 LAB
FINAL DIAGNOSIS: NORMAL
HPV HR 12 DNA CVX QL NAA+PROBE: NEGATIVE
HPV16 DNA SPEC QL NAA+PROBE: NEGATIVE
HPV18 DNA SPEC QL NAA+PROBE: NEGATIVE
SPECIMEN DESCRIPTION: NORMAL
SPECIMEN SOURCE CVX/VAG CYTO: NORMAL

## 2018-11-07 ENCOUNTER — OFFICE VISIT (OUTPATIENT)
Dept: PSYCHOLOGY | Facility: CLINIC | Age: 53
End: 2018-11-07
Payer: COMMERCIAL

## 2018-11-07 DIAGNOSIS — F41.9 ANXIETY DISORDER, UNSPECIFIED TYPE: Primary | ICD-10-CM

## 2018-11-07 NOTE — MR AVS SNAPSHOT
After Visit Summary   11/7/2018    Nkechi Manrique    MRN: [id number]           Patient Information     Date Of Birth          1965        Visit Information        Provider Department      11/7/2018 3:00 PM Kellie Granados, PhD  [facility]         Today's Diagnoses     Anxiety disorder, unspecified type    -  1       Follow-ups after your visit        Your next 10 appointments already scheduled     Nov 27, 2018  8:00 AM CST   Return Visit with Kellie Granados PhD SHANNON   [facility]  ([facility])    [facility]  [address]  [address]  [facility] [address]   [phone]            Jan 11, 2019  3:00 PM CST   Return Visit with Kellie Granados, PhD SHANNON   [facility]  ([facility])    [facility]  [address]  [address]  [facility] [address]   [phone]            Jan 25, 2019 11:00 AM CST   Return Visit with Kellie Granados, PhD    [facility]  ([facility])    [facility]  [address]  [address]  [facility] [address]   [phone]            Feb 06, 2019  3:00 PM CST   Return Visit with Kellie Granados, PhD    [facility]  ([facility])    [facility]  [address]  [address]  [facility] [address] [phone]            Feb 20, 2019  3:00 PM CST   Return Visit with Kellie Granados, PhD    [facility]  ([facility])    [facility]  [address]  [address]  [facility] [address] [phone]              Who to contact     Please call your clinic at [phone] to:    Ask questions about your health    Make or cancel appointments    Discuss your medicines    Learn about your test results    Speak to your doctor            Additional Information  About Your Visit        [employer] Information     [employer] gives you secure access to your electronic health record. If you see a primary care provider, you can also send messages to your care team and make appointments. If you have questions, please call your primary care clinic.  If you do not have a primary care provider, please call [phone] and they will assist you.      [employer] is an electronic gateway that provides easy, online access to your medical records. With [employer], you can request a clinic appointment, read your test results, renew a prescription or communicate with your care team.     To access your existing account, please contact your [facility] or call [phone] for assistance.        Care EveryWhere ID     This is your Care EveryWhere ID. This could be used by other organizations to access your [place] medical records  [id number]        Your Vitals Were     Last Period                   10/16/2015 (Exact Date)            Blood Pressure from Last 3 Encounters:   10/31/18 118/78   08/11/17 121/77   06/28/17 110/72    Weight from Last 3 Encounters:   06/28/17 89 kg (196 lb 4.8 oz)   11/02/15 79.4 kg (175 lb)   10/28/14 79.4 kg (175 lb)              We Performed the Following     Individual Psychotherapy - Psychotherapy with pt and/or family present  60 mins [53+ mins] (02692)        Primary Care Provider Office Phone # Fax #    Dinorah Alonso [phone] [address] [address]        Equal Access to Services     [facility] AH: Hadii aad ku hadasho Soomaali, waaxda luqadaha, qaybta kaalmada adeegyada, key lesterin haymamadou tao . So [facility] [phone].    ATENCIÓN: Si habla español, tiene a phelps disposición servicios gratuitos de asistencia lingüística. Llame al [phone].    We comply with applicable federal civil rights laws and [place] laws. We do not discriminate on the basis of race,  color, national origin, age, disability, sex, sexual orientation, or gender identity.            Thank you!     Thank you for choosing [facility]   for your care. Our goal is always to provide you with excellent care. Hearing back from our patients is one way we can continue to improve our services. Please take a few minutes to complete the written survey that you may receive in the mail after your visit with us. Thank you!             Your Updated Medication List - Protect others around you: Learn how to safely use, store and throw away your medicines at www.disposemymeds.org.          This list is accurate as of 11/7/18  4:19 PM.  Always use your most recent med list.                   Brand Name Dispense Instructions for use Diagnosis    albuterol 108 (90 Base) MCG/ACT inhaler    PROAIR HFA    2 Inhaler    Inhale 1-2 puffs into the lungs every 6 hours as needed for shortness of breath / dyspnea    Mild persistent asthma without complication       CLARITIN PO      1 TABLET DAILY        EPINEPHrine 0.3 MG/0.3ML injection 2-pack    EPIPEN/ADRENACLICK/or ANY BX GENERIC EQUIV    0.3 mL    Inject 0.3 mLs (0.3 mg) into the muscle once as needed for anaphylaxis    Allergic reaction to bee sting       estradiol 0.1 MG/GM cream    ESTRACE VAGINAL    42.5 g    Place 2 g vaginally once a week    Menopause       fluticasone 50 MCG/ACT spray    FLONASE     Spray 2 sprays into both nostrils daily        PROGESTERONE 100MG CAPSULES COMPOUND     180 capsule    1-2 capsules by mouth every night at bedtime    Symptomatic menopausal or female climacteric states       vitamin B complex with vitamin C Tabs tablet      Take 1 tablet by mouth daily        VITAMIN D (CHOLECALCIFEROL) PO      Take by mouth daily        ZANTAC PO
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
## 2018-11-16 ENCOUNTER — TRANSFERRED RECORDS (OUTPATIENT)
Dept: HEALTH INFORMATION MANAGEMENT | Facility: CLINIC | Age: 53
End: 2018-11-16

## 2018-11-27 ENCOUNTER — OFFICE VISIT (OUTPATIENT)
Dept: PSYCHOLOGY | Facility: CLINIC | Age: 53
End: 2018-11-27
Payer: COMMERCIAL

## 2018-11-27 DIAGNOSIS — F41.9 ANXIETY DISORDER, UNSPECIFIED TYPE: Primary | ICD-10-CM

## 2018-11-27 NOTE — MR AVS SNAPSHOT
After Visit Summary   11/27/2018    Nkechi Manrique    MRN: [id number]           Patient Information     Date Of Birth          1965        Visit Information        Provider Department      11/27/2018 8:00 AM Kellie Granados, PhD  [facility]         Today's Diagnoses     Anxiety disorder, unspecified type    -  1       Follow-ups after your visit        Your next 10 appointments already scheduled     Jan 11, 2019  3:00 PM CST   Return Visit with Kellie Granados PhD SHANNON   [facility]  ([facility])    [facility]  [address]  [facility] [address]   [phone]            Jan 25, 2019 11:00 AM CST   Return Visit with Kellie Granados, PhD SHANNON   [facility]  ([facility])    [facility]  [address]  [address]  [facility] [address]   [phone]            Feb 06, 2019  3:00 PM CST   Return Visit with Kellie Granados, PhD    [facility]  ([facility])    [facility]  [address]  [address]  [facility] [address] [phone]            Feb 20, 2019  3:00 PM CST   Return Visit with Kellie Granados, PhD    [facility]  ([facility])    [facility]  [address]  [address]  [facility] [address] [phone]              Who to contact     Please call your clinic at [phone] to:    Ask questions about your health    Make or cancel appointments    Discuss your medicines    Learn about your test results    Speak to your doctor            Additional Information About Your Visit        MyChart Information     [employer] gives you secure access to your electronic health record. If you see a primary care provider, you can also send messages to your care team and make appointments. If you have questions, please  call your primary care clinic.  If you do not have a primary care provider, please call [phone] and they will assist you.      [employer] is an electronic gateway that provides easy, online access to your medical records. With [employer], you can request a clinic appointment, read your test results, renew a prescription or communicate with your care team.     To access your existing account, please contact your [facility] or call [phone] for assistance.        Care EveryWhere ID     This is your Care EveryWhere ID. This could be used by other organizations to access your [place] medical records  [id number]        Your Vitals Were     Last Period                   10/16/2015 (Exact Date)            Blood Pressure from Last 3 Encounters:   10/31/18 118/78   08/11/17 121/77   06/28/17 110/72    Weight from Last 3 Encounters:   06/28/17 89 kg (196 lb 4.8 oz)   11/02/15 79.4 kg (175 lb)   10/28/14 79.4 kg (175 lb)              We Performed the Following     Individual Psychotherapy - Psychotherapy with pt and/or family present  60 mins [53+ mins] (10054)        Primary Care Provider Office Phone # Fax #    Dinorah Alonso [phone] [address] [address]        Equal Access to Services     KAREN DAVID AH: Hadii aad ku hadasho Soomaali, waaxda luqadaha, qaybta kaalmada adeegyada, waxnicky idiin hayaan dg hinson. So [facility] [phone].    ATENCIÓN: Si habla español, tiene a phelps disposición servicios gratuitos de asistencia lingüística. Llame al [phone].    We comply with applicable federal civil rights laws and [place] laws. We do not discriminate on the basis of race, color, national origin, age, disability, sex, sexual orientation, or gender identity.            Thank you!     Thank you for choosing [facility]   for your care. Our goal is always to provide you with excellent care. Hearing  back from our patients is one way we can continue to improve our services. Please take a few minutes to complete the written survey that you may receive in the mail after your visit with us. Thank you!             Your Updated Medication List - Protect others around you: Learn how to safely use, store and throw away your medicines at www.disposemymeds.org.          This list is accurate as of 11/27/18 11:59 PM.  Always use your most recent med list.                   Brand Name Dispense Instructions for use Diagnosis    albuterol 108 (90 Base) MCG/ACT inhaler    PROAIR HFA    2 Inhaler    Inhale 1-2 puffs into the lungs every 6 hours as needed for shortness of breath / dyspnea    Mild persistent asthma without complication       CLARITIN PO      1 TABLET DAILY        EPINEPHrine 0.3 MG/0.3ML injection 2-pack    EPIPEN/ADRENACLICK/or ANY BX GENERIC EQUIV    0.3 mL    Inject 0.3 mLs (0.3 mg) into the muscle once as needed for anaphylaxis    Allergic reaction to bee sting       estradiol 0.1 MG/GM vaginal cream    ESTRACE VAGINAL    42.5 g    Place 2 g vaginally once a week    Menopause       fluticasone 50 MCG/ACT nasal spray    FLONASE     Spray 2 sprays into both nostrils daily        PROGESTERONE 100MG CAPSULES COMPOUND     180 capsule    1-2 capsules by mouth every night at bedtime    Symptomatic menopausal or female climacteric states       vitamin B complex with vitamin C tablet      Take 1 tablet by mouth daily        VITAMIN D (CHOLECALCIFEROL) PO      Take by mouth daily        ZANTAC PO
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
## 2019-01-11 ENCOUNTER — OFFICE VISIT (OUTPATIENT)
Dept: PSYCHOLOGY | Facility: CLINIC | Age: 54
End: 2019-01-11
Payer: COMMERCIAL

## 2019-01-11 DIAGNOSIS — F41.9 ANXIETY DISORDER, UNSPECIFIED TYPE: Primary | ICD-10-CM
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
## 2019-01-17 ENCOUNTER — TRANSFERRED RECORDS (OUTPATIENT)
Dept: HEALTH INFORMATION MANAGEMENT | Facility: CLINIC | Age: 54
End: 2019-01-17

## 2019-01-25 ENCOUNTER — OFFICE VISIT (OUTPATIENT)
Dept: PSYCHOLOGY | Facility: CLINIC | Age: 54
End: 2019-01-25
Payer: COMMERCIAL

## 2019-01-25 DIAGNOSIS — F41.9 ANXIETY DISORDER, UNSPECIFIED TYPE: Primary | ICD-10-CM
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
## 2019-02-05 ENCOUNTER — ALLIED HEALTH/NURSE VISIT (OUTPATIENT)
Dept: OBGYN | Facility: CLINIC | Age: 54
End: 2019-02-05
Payer: COMMERCIAL

## 2019-02-05 DIAGNOSIS — Z23 ENCOUNTER FOR IMMUNIZATION: Primary | ICD-10-CM

## 2019-02-05 PROCEDURE — 25000581 ZZH RX MED A9270 GY (STAT IND- M) 250: Mod: ZF

## 2019-02-05 PROCEDURE — 40000269 ZZH STATISTIC NO CHARGE FACILITY FEE: Mod: ZF

## 2019-02-05 PROCEDURE — 90471 IMMUNIZATION ADMIN: CPT | Mod: ZF

## 2019-02-05 PROCEDURE — 90750 HZV VACC RECOMBINANT IM: CPT | Mod: ZF

## 2019-02-06 ENCOUNTER — OFFICE VISIT (OUTPATIENT)
Dept: PSYCHOLOGY | Facility: CLINIC | Age: 54
End: 2019-02-06
Payer: COMMERCIAL

## 2019-02-06 DIAGNOSIS — F41.9 ANXIETY DISORDER, UNSPECIFIED TYPE: Primary | ICD-10-CM
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
## 2019-02-19 ENCOUNTER — ANCILLARY PROCEDURE (OUTPATIENT)
Dept: MAMMOGRAPHY | Facility: CLINIC | Age: 54
End: 2019-02-19
Payer: COMMERCIAL

## 2019-02-19 DIAGNOSIS — Z12.39 SCREENING BREAST EXAMINATION: ICD-10-CM

## 2019-03-22 ENCOUNTER — OFFICE VISIT (OUTPATIENT)
Dept: PSYCHOLOGY | Facility: CLINIC | Age: 54
End: 2019-03-22
Payer: COMMERCIAL

## 2019-03-22 DIAGNOSIS — F41.9 ANXIETY DISORDER, UNSPECIFIED TYPE: Primary | ICD-10-CM
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
## 2019-04-08 ENCOUNTER — OFFICE VISIT (OUTPATIENT)
Dept: PSYCHOLOGY | Facility: CLINIC | Age: 54
End: 2019-04-08
Payer: COMMERCIAL

## 2019-04-08 DIAGNOSIS — F41.9 ANXIETY DISORDER, UNSPECIFIED TYPE: Primary | ICD-10-CM
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
## 2019-04-30 ENCOUNTER — OFFICE VISIT (OUTPATIENT)
Dept: PSYCHOLOGY | Facility: CLINIC | Age: 54
End: 2019-04-30
Payer: COMMERCIAL

## 2019-04-30 DIAGNOSIS — F41.9 ANXIETY DISORDER, UNSPECIFIED TYPE: Primary | ICD-10-CM
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
## 2019-05-17 ENCOUNTER — OFFICE VISIT (OUTPATIENT)
Dept: PSYCHOLOGY | Facility: CLINIC | Age: 54
End: 2019-05-17
Payer: COMMERCIAL

## 2019-05-17 DIAGNOSIS — F41.9 ANXIETY DISORDER, UNSPECIFIED TYPE: Primary | ICD-10-CM
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
## 2019-05-30 ENCOUNTER — TRANSFERRED RECORDS (OUTPATIENT)
Dept: HEALTH INFORMATION MANAGEMENT | Facility: CLINIC | Age: 54
End: 2019-05-30

## 2019-05-31 ENCOUNTER — TRANSFERRED RECORDS (OUTPATIENT)
Dept: HEALTH INFORMATION MANAGEMENT | Facility: CLINIC | Age: 54
End: 2019-05-31

## 2019-06-05 ENCOUNTER — OFFICE VISIT (OUTPATIENT)
Dept: PSYCHOLOGY | Facility: CLINIC | Age: 54
End: 2019-06-05
Payer: COMMERCIAL

## 2019-06-05 DIAGNOSIS — F41.9 ANXIETY DISORDER, UNSPECIFIED TYPE: Primary | ICD-10-CM
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
## 2019-06-10 ENCOUNTER — TELEPHONE (OUTPATIENT)
Dept: DERMATOLOGY | Facility: CLINIC | Age: 54
End: 2019-06-10

## 2019-06-10 NOTE — TELEPHONE ENCOUNTER
ONCOLOGY INTAKE: Records Information      APPT INFORMATION: 06/28 evan/ Analy  Referring provider:  self  Referring provider s clinic:  self  Reason for visit/diagnosis:   Mylanoma  Has patient been notified of appointment date and time?: yes    RECORDS INFORMATION:  Were the records received with the referral (via [employer])? no    Has patient been seen for any external appt for this diagnosis? yes    If yes, where? [facility]     Has patient had any imaging or procedures outside of [facility] for this condition? no      If Yes, where? na    ADDITIONAL INFORMATION:  Dx: Mylanoma: Caller pt: Ref by: Self Records at [facility] [place]:    Bx No Scans

## 2019-06-10 NOTE — TELEPHONE ENCOUNTER
[facility] Call Center    Phone Message    May a detailed message be left on voicemail: yes    Reason for Call: Other: Patient sent in an appointment request to be seen for 'melanoma in situ, strong family history of cancer'. Patient can be reached at [phone] for scheduling.      Action Taken: Message routed to:  [facility] ([facility]): Dermatology

## 2019-06-11 NOTE — TELEPHONE ENCOUNTER
RECORDS STATUS - ALL OTHER DIAGNOSIS      RECORDS RECEIVED FROM: [facility]   DATE RECEIVED: 06/18/2019   NOTES STATUS DETAILS   OFFICE NOTE from referring provider yes In epic   OFFICE NOTE from medical oncologist na    DISCHARGE SUMMARY from hospital na    DISCHARGE REPORT from the ER na    OPERATIVE REPORT na    MEDICATION LIST na    CLINICAL TRIAL TREATMENTS TO DATE na    LABS     PATHOLOGY REPORTS yes Received from [facility] skin left anterior lower leg sent to 5th floor pathology   ANYTHING RELATED TO DIAGNOSIS na    GENONOMIC TESTING na    TYPE:     IMAGING (NEED IMAGES & REPORT) na    CT SCANS     MRI     MAMMO     ULTRASOUND     PET       Action Q[place]a   Action Taken Request sent to [facility]. Records bx pending

## 2019-06-11 NOTE — TELEPHONE ENCOUNTER
Pt is already scheduled to be seen in the oncology clinic and would like to keep that appointment. Fax number was provided for her to send the biopsy result.

## 2019-06-12 ENCOUNTER — TRANSFERRED RECORDS (OUTPATIENT)
Dept: HEALTH INFORMATION MANAGEMENT | Facility: CLINIC | Age: 54
End: 2019-06-12

## 2019-06-12 ENCOUNTER — OFFICE VISIT (OUTPATIENT)
Dept: FAMILY MEDICINE | Facility: CLINIC | Age: 54
End: 2019-06-12
Payer: COMMERCIAL

## 2019-06-12 VITALS
HEIGHT: 68 IN | TEMPERATURE: 98.9 F | SYSTOLIC BLOOD PRESSURE: 111 MMHG | HEART RATE: 83 BPM | OXYGEN SATURATION: 95 % | BODY MASS INDEX: 31.67 KG/M2 | RESPIRATION RATE: 16 BRPM | WEIGHT: 209 LBS | DIASTOLIC BLOOD PRESSURE: 73 MMHG

## 2019-06-12 DIAGNOSIS — Z80.9 FAMILY HISTORY OF CANCER: ICD-10-CM

## 2019-06-12 DIAGNOSIS — R63.5 WEIGHT GAIN: ICD-10-CM

## 2019-06-12 DIAGNOSIS — T63.441A ALLERGIC REACTION TO BEE STING: ICD-10-CM

## 2019-06-12 DIAGNOSIS — Z00.00 ROUTINE GENERAL MEDICAL EXAMINATION AT A HEALTH CARE FACILITY: Primary | ICD-10-CM

## 2019-06-12 LAB — T3 SERPL-MCNC: 94 NG/DL (ref 60–181)

## 2019-06-12 PROCEDURE — 82947 ASSAY GLUCOSE BLOOD QUANT: CPT | Performed by: FAMILY MEDICINE

## 2019-06-12 PROCEDURE — 84443 ASSAY THYROID STIM HORMONE: CPT | Performed by: FAMILY MEDICINE

## 2019-06-12 PROCEDURE — 90714 TD VACC NO PRESV 7 YRS+ IM: CPT | Performed by: FAMILY MEDICINE

## 2019-06-12 PROCEDURE — 84439 ASSAY OF FREE THYROXINE: CPT | Performed by: FAMILY MEDICINE

## 2019-06-12 PROCEDURE — 90471 IMMUNIZATION ADMIN: CPT | Performed by: FAMILY MEDICINE

## 2019-06-12 PROCEDURE — 99396 PREV VISIT EST AGE 40-64: CPT | Mod: 25 | Performed by: FAMILY MEDICINE

## 2019-06-12 PROCEDURE — 83721 ASSAY OF BLOOD LIPOPROTEIN: CPT | Performed by: FAMILY MEDICINE

## 2019-06-12 PROCEDURE — 36415 COLL VENOUS BLD VENIPUNCTURE: CPT | Performed by: FAMILY MEDICINE

## 2019-06-12 PROCEDURE — 84480 ASSAY TRIIODOTHYRONINE (T3): CPT | Performed by: FAMILY MEDICINE

## 2019-06-12 PROCEDURE — 99213 OFFICE O/P EST LOW 20 MIN: CPT | Mod: 25 | Performed by: FAMILY MEDICINE

## 2019-06-12 PROCEDURE — 83718 ASSAY OF LIPOPROTEIN: CPT | Performed by: FAMILY MEDICINE

## 2019-06-12 RX ORDER — EPINEPHRINE 0.3 MG/.3ML
0.3 INJECTION SUBCUTANEOUS
Qty: 0.3 ML | Refills: 3 | Status: SHIPPED | OUTPATIENT
Start: 2019-06-12 | End: 2021-05-27

## 2019-06-12 ASSESSMENT — MIFFLIN-ST. JEOR: SCORE: 1596.52

## 2019-06-12 NOTE — NURSING NOTE
"Chief Complaint   Patient presents with     Physical     /73 (BP Location: Left arm, Patient Position: Sitting, Cuff Size: Adult Large)   Pulse 83   Temp 98.9  F (37.2  C) (Oral)   Resp 16   Ht 1.727 m (5' 8\")   Wt 94.8 kg (209 lb)   LMP 10/16/2015 (Exact Date)   SpO2 95%   Breastfeeding? No   BMI 31.78 kg/m   Estimated body mass index is 31.78 kg/m  as calculated from the following:    Height as of this encounter: 1.727 m (5' 8\").    Weight as of this encounter: 94.8 kg (209 lb).  bp completed using cuff size: large       Health Maintenance addressed:  ACT    Possibly completing today per provider review.    Александр Higgins MA     "

## 2019-06-12 NOTE — PROGRESS NOTES
SUBJECTIVE:   CC: Nkechi Manrique is an 53 year old woman who presents for preventive health visit.     Healthy Habits:     Getting at least 3 servings of Calcium per day:  Yes    Bi-annual eye exam:  Yes    Dental care twice a year:  Yes    Sleep apnea or symptoms of sleep apnea:  None    Diet:  Regular (no restrictions)    Frequency of exercise:  4-5 days/week    Duration of exercise:  45-60 minutes    Taking medications regularly:  Yes    Barriers to taking medications:  None    Medication side effects:  None    PHQ-2 Total Score: 0    Additional concerns today:  Yes     Well adult:  UTD mammogram and vaccines show due for td  Not fasting today but can do non fasting LDL  utd colon screening    Overweight - trying diet and exercise but wonders about thyroid check - has had constipation    Recently diagnosis with Melanoma. Left leg  Has fam hisotry of aggressive cancers  Brother  age 25 pancreatic cancer  Brother diagnosed 56 with thymus cancer    Today's PHQ-2 Score:   PHQ-2 (  [employer]) 2017   Q1: Little interest or pleasure in doing things 0   Q2: Feeling down, depressed or hopeless 0   PHQ-2 Score 0   Q1: Little interest or pleasure in doing things Not at all   Q2: Feeling down, depressed or hopeless Not at all   PHQ-2 Score 0       Abuse: Current or Past(Physical, Sexual or Emotional)- No  Do you feel safe in your environment? Yes    Social History     Tobacco Use     Smoking status: Never Smoker     Smokeless tobacco: Never Used   Substance Use Topics     Alcohol use: Yes     Alcohol/week: 0.6 oz     Comment: 2 drinks per week     If you drink alcohol do you typically have >3 drinks per day or >7 drinks per week? No    Alcohol Use 2017   Prescreen: >3 drinks/day or >7 drinks/week? The patient does not drink >3 drinks per day nor >7 drinks per week.   No flowsheet data found.    Reviewed orders with patient.  Reviewed health maintenance and updated orders accordingly - Yes  Lab work is in  process    Mammogram Screening: Patient over age 50, mutual decision to screen reflected in health maintenance.    Pertinent mammograms are reviewed under the imaging tab.  History of abnormal Pap smear: NO - age 30-65 PAP every 5 years with negative HPV co-testing recommended  PAP / HPV Latest Ref Rng & Units 10/31/2018 9/27/2013 3/16/2010   PAP - NIL NIL NIL   HPV 16 DNA NEG:Negative Negative - -   HPV 18 DNA NEG:Negative Negative - -   OTHER HR HPV NEG:Negative Negative - -     Reviewed and updated as needed this visit by clinical staff         Reviewed and updated as needed this visit by Provider        Past Medical History:   Diagnosis Date     Abnormal Pap smear 1990    Biopsy- ok     Anxiety      Blood dyscrasia     Factor 5 Leiden HTZ     Breast lump 9/27/2013     CMC arthritis, thumb, degenerative 11/3/2015     Congenital deficiency of other clotting factors     Factor V Leiden positive, had superficial thrombus, no DVT     Mild intermittent asthma with exacerbation     seasonally related, rare albuterol     Plantar fascia syndrome     L ft in '10, R ft in '11     Seasonal allergic rhinitis     claritin spring/August     Stress fracture of lower leg ~2002, 2009     Varicosities 2015    Varicose veins removed      Past Surgical History:   Procedure Laterality Date     BIOPSY  1998 2000 2017    Skin tags moles cysts     COLONOSCOPY  2015    Polyp removed     PHLEBECTOMY MULTIPLE STAB  10/15    [facility], Dr. Ramirez       Review of Systems  CONSTITUTIONAL: NEGATIVE for fever, chills, change in weight  INTEGUMENTARU/SKIN: NEGATIVE for worrisome rashes, moles or lesions  EYES: NEGATIVE for vision changes or irritation  ENT: NEGATIVE for ear, mouth and throat problems  RESP: NEGATIVE for significant cough or SOB  BREAST: NEGATIVE for masses, tenderness or discharge  CV: NEGATIVE for chest pain, palpitations or peripheral edema  GI: NEGATIVE for nausea, abdominal pain, heartburn, or change in bowel habits  :  "NEGATIVE for unusual urinary or vaginal symptoms. Periods are regular.  MUSCULOSKELETAL: NEGATIVE for significant arthralgias or myalgia  NEURO: NEGATIVE for weakness, dizziness or paresthesias  PSYCHIATRIC: NEGATIVE for changes in mood or affect     OBJECTIVE:   /73 (BP Location: Left arm, Patient Position: Sitting, Cuff Size: Adult Large)   Pulse 83   Temp 98.9  F (37.2  C) (Oral)   Resp 16   Ht 1.727 m (5' 8\")   Wt 94.8 kg (209 lb)   LMP 10/16/2015 (Exact Date)   SpO2 95%   Breastfeeding? No   BMI 31.78 kg/m    Physical Exam  GENERAL: healthy, alert and no distress  EYES: Eyes grossly normal to inspection, PERRL and conjunctivae and sclerae normal  HENT: ear canals and TM's normal, nose and mouth without ulcers or lesions  NECK: no adenopathy, no asymmetry, masses, or scars and thyroid normal to palpation  RESP: lungs clear to auscultation - no rales, rhonchi or wheezes  BREAST: normal without masses, tenderness or nipple discharge and no palpable axillary masses or adenopathy  CV: regular rate and rhythm, normal S1 S2, no S3 or S4, no murmur, click or rub, no peripheral edema and peripheral pulses strong  ABDOMEN: soft, nontender, no hepatosplenomegaly, no masses and bowel sounds normal  MS: no gross musculoskeletal defects noted, no edema  SKIN: no suspicious lesions or rashes  NEURO: Normal strength and tone, mentation intact and speech normal  PSYCH: mentation appears normal, affect normal/bright    Diagnostic Test Results:  Labs reviewed in Epic    ASSESSMENT/PLAN:   1. Routine general medical examination at a health care facility  Reviewed well adult screens and guidance     2. Allergic reaction to bee sting  refills  - EPINEPHrine (EPIPEN/ADRENACLICK/OR ANY BX GENERIC EQUIV) 0.3 MG/0.3ML injection 2-pack; Inject 0.3 mLs (0.3 mg) into the muscle once as needed for anaphylaxis  Dispense: 0.3 mL; Refill: 3    3. Family history of cancer  Encouraged genetics counseling  - CANCER RISK MGMT/CANCER " "GENETIC COUNSELING REFERRAL    4. Weight gain  Reviewed lifestyle diet exercise and labs as noted below  - T3, total  - T4, free  - TSH  - LDL cholesterol direct  - HDL cholesterol  - **Glucose FUTURE 1yr    COUNSELING:  Reviewed preventive health counseling, as reflected in patient instructions       Regular exercise       Healthy diet/nutrition       Colon cancer screening       (Vandana)menopause management    Estimated body mass index is 29.85 kg/m  as calculated from the following:    Height as of 10/31/18: 1.727 m (5' 8\").    Weight as of 6/28/17: 89 kg (196 lb 4.8 oz).         reports that she has never smoked. She has never used smokeless tobacco.      Counseling Resources:  ATP IV Guidelines  Pooled Cohorts Equation Calculator  Breast Cancer Risk Calculator  FRAX Risk Assessment  ICSI Preventive Guidelines  Dietary Guidelines for Americans, 2010  USDA's MyPlate  ASA Prophylaxis  Lung CA Screening    Kiki Liao MD  [facility]  "

## 2019-06-13 LAB
GLUCOSE SERPL-MCNC: 110 MG/DL (ref 70–99)
HDLC SERPL-MCNC: 79 MG/DL
LDLC SERPL DIRECT ASSAY-MCNC: 107 MG/DL
T4 FREE SERPL-MCNC: 1.02 NG/DL (ref 0.76–1.46)
TSH SERPL DL<=0.005 MIU/L-ACNC: 1.49 MU/L (ref 0.4–4)

## 2019-06-13 ASSESSMENT — ASTHMA QUESTIONNAIRES: ACT_TOTALSCORE: 25

## 2019-06-13 NOTE — TELEPHONE ENCOUNTER
ONCOLOGY INTAKE: Records Information      APPT INFORMATION:  Referring provider:  Kiki Liao MD  Referring provider s clinic:   [place]  Reason for visit/diagnosis:  Genetics - Family Hx multiple cancers  Has patient been notified of appointment date and time?: Per PT    RECORDS INFORMATION:  Were the records received with the referral (via [employer])? No - Internal referral    Has patient been seen for any external appt for this diagnosis? Records in Epic    ADDITIONAL INFORMATION:  NA

## 2019-06-13 NOTE — RESULT ENCOUNTER NOTE
Hello,    The labs showed a slightly elevated glucose however you were not fasting so this is at a reasonable level.  The rest of the labs were all well within normal limits. Although the LDl flags as abnormal this is absolutely fine for a non diabetic patient - no concern.    Kiki Liao MD

## 2019-06-20 PROCEDURE — 00000346 ZZHCL STATISTIC REVIEW OUTSIDE SLIDES TC 88321: Performed by: INTERNAL MEDICINE

## 2019-06-26 LAB — COPATH REPORT: NORMAL

## 2019-06-27 NOTE — PROGRESS NOTES
[facility] CANCER CLINIC AT [facility]  HEMATOLOGY AND ONCOLOGY  NEW PATIENT VISIT    NAME: Nkechi Manrique RADHA: 2019   MRN: [id number]      REFERRING PHYSICIAN: Referred Self       Chief complaint:   Recent diagnosis of melanoma in situ         History of Present Illness:   Nkechi Manrique is a 53 year old female who was recently found melanoma in situ in her left lower extremity. She states that she missed her annual dermatologic exam 2 years ago and noticed a change of mole in her left lower anterior lower extremity, which became larger and bigger. When she had an annual total body skin exam with dermatologist (Dr. Pamela Hawley) 19, the lesion in left lower leg was measured 8 mm irregular brown macule with atypia on dermoscopy. Pathology from shave biopsy on 19 showed melanoma in situ with negative margin, but < 1mm in the plane of section examined. Thus, re-excision with 0.5 cm clear margin is recommended.    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    Her family history is significant for the following:  Brother - pancreatic cancer,  at age of 25  Brother - Thymus cancer  Sister -  from non cancer related medical problem  Maternal grandmother - breast cancer   No family history of skin cancer    She already scheduled an appointment with a genetic counselor.         Review of Systems:   ROS: Comprehensive 10 point ROS negative except for that detailed above.         Past Medical History:     Past Medical History:   Diagnosis Date     Abnormal Pap smear     Biopsy- ok     Anxiety      Blood dyscrasia     Factor 5 Leiden HTZ     Breast lump 2013     CMC arthritis, thumb, degenerative 11/3/2015     Congenital deficiency of other clotting factors     Factor V Leiden positive, had superficial thrombus, no DVT     Mild intermittent asthma with exacerbation      seasonally related, rare albuterol     Plantar fascia syndrome     L ft in '10, R ft in '11     Seasonal allergic rhinitis     claritin spring/August     Stress fracture of lower leg ~2002, 2009     Varicosities 2015    Varicose veins removed              Past Surgical History:     Past Surgical History:   Procedure Laterality Date     BIOPSY  1998 2000 2017    Skin tags moles cysts     COLONOSCOPY  2015    Polyp removed     PHLEBECTOMY MULTIPLE STAB  10/15    [facility], Dr. Ramirez            Social History:     Social History     Socioeconomic History     Marital status: Single     Spouse name: Not on file     Number of children: Not on file     Years of education: Not on file     Highest education level: Not on file   Occupational History     Employer: [employer]     Comment: Birthplace   Social Needs     Financial resource strain: Not on file     Food insecurity:     Worry: Not on file     Inability: Not on file     Transportation needs:     Medical: Not on file     Non-medical: Not on file   Tobacco Use     Smoking status: Never Smoker     Smokeless tobacco: Never Used   Substance and Sexual Activity     Alcohol use: Yes     Alcohol/week: 0.6 oz     Comment: 2 drinks per week     Drug use: No     Sexual activity: Yes     Partners: Male     Birth control/protection: Post-menopausal, Male Surgical   Lifestyle     Physical activity:     Days per week: Not on file     Minutes per session: Not on file     Stress: Not on file   Relationships     Social connections:     Talks on phone: Not on file     Gets together: Not on file     Attends Buddhist service: Not on file     Active member of club or organization: Not on file     Attends meetings of clubs or organizations: Not on file     Relationship status: Not on file     Intimate partner violence:     Fear of current or ex partner: Not on file     Emotionally abused: Not on file     Physically abused: Not on file     Forced sexual activity: Not on  "file   Other Topics Concern     Not on file   Social History Narrative    Caffeine intake/servings daily - 2-3    Calcium intake/servings daily - 2-3    Exercise 6-7 times weekly - describe biking, swimming, wts    Sunscreen used - Yes    Seatbelts used - Yes    Guns stored in the home - No    Self Breast Exam - No    Pap test up to date -  Yes    Eye exam up to date -  Yes    Dental exam up to date -  Yes    DEXA scan up to date -  Not Applicable    Flex Sig/Colonoscopy up to date -  Not Applicable    Mammography up to date -  Yes    Immunizations reviewed and up to date - Yes    Abuse: Current or Past (Physical, Sexual or Emotional) - No    Do you feel safe in your environment - Yes    Do you cope well with stress - Yes    Do you suffer from insomnia - No    Rosy Lam LPN      '10                                    Family History:     Family History   Problem Relation Age of Onset     Hypertension Mother         meds     Depression Mother      Cerebrovascular Disease Father      Circulatory Father         phlebitis, blood clots in leg veins     Alzheimer Disease Father         dx ~ in mid 60s     Breast Cancer Maternal Grandmother         postmenopausal     Cancer Brother         pancreas,  age 25     Psychotic Disorder Sister         ? depression, schizophrenia     Other Cancer Brother         Pancreatic     Cancer Brother         Thymus Cancer--\"Goods Symdrone\"     Genetic Disorder Brother         Autoimmune IGG            Allergies:     Allergies   Allergen Reactions     Septra [Bactrim] Hives     Wasps [Hornets] Swelling     Significant leg swelling- epi pen rec in case worsening sx's            Home Medications:     Current Outpatient Medications   Medication     albuterol (ALBUTEROL) 108 (90 BASE) MCG/ACT inhaler     CLARITIN OR     EPINEPHrine (EPIPEN/ADRENACLICK/OR ANY BX GENERIC EQUIV) 0.3 MG/0.3ML injection 2-pack     estradiol (ESTRACE VAGINAL) 0.1 MG/GM cream     fluticasone (FLONASE) 50 " "MCG/ACT nasal spray     PROGESTERONE 100MG CAPSULES COMPOUND     RaNITidine HCl (ZANTAC PO)     vitamin B complex with vitamin C (VITAMIN  B COMPLEX) TABS tablet     VITAMIN D, CHOLECALCIFEROL, PO     No current facility-administered medications for this visit.             Physical Exam:     /73 (BP Location: Right arm, Patient Position: Sitting, Cuff Size: Adult Large)   Pulse 61   Temp 97.6  F (36.4  C) (Oral)   Resp 16   Ht 1.727 m (5' 7.99\")   Wt 94.9 kg (209 lb 4.8 oz)   LMP 10/16/2015 (Exact Date)   SpO2 97%   BMI 31.83 kg/m      ECO   General:  no acute distress.  Skin: No concerning lesions or rash on exposed surfaces. Well healed biopsy site in the left anterior lower extremity  HEENT: NCAT. anicteric sclera. Oral mucosa pink and moist with no lesions or thrush.  Neck: Neck supple.  Respiratory: Non-labored breathing, good air exchange, lungs clear to auscultation bilaterally.  Cardiovascular: Regular rate and rhythm. No murmur or rub.   Abdomen: Normoactive bowel sounds. Abdomen soft, non-distended, and non-tender.   Extremities: grossly normal, non-tender, no edema. Good strength and ROM.  Neurologic: A&O x 3, CNs 2-12 grossly intact, speech normal. Grossly non-focal.   Psychiatric: Mentation and affect appear normal.           Data:          Labs:   CBC   Lab Results   Component Value Date/Time    WBC 8.6 2017 03:50 PM    HGB 13.7 2017 03:50 PM     2017 03:50 PM      BMP   Lab Results   Component Value Date/Time     2017 03:50 PM    POTASSIUM 4.0 2017 03:50 PM    BUN 13 2017 03:50 PM    CR 0.79 2017 03:50 PM    KIM 9.2 2017 03:50 PM      LFTs   Lab Results   Component Value Date/Time    BILITOTAL 0.4 2017 03:50 PM    ALKPHOS 77 2017 03:50 PM    AST 21 2017 03:50 PM    ALT 26 2017 03:50 PM                  Images:     Results for orders placed or performed in visit on 19   MA Screen Bilateral " w/Alfredo    Narrative    Examination: Bilateral digital screening mammography with  tomosynthesis and computer aided detection.    TECHNIQUE: Tomosynthesis    Comparison: Mammograms 2/13/2018, 2/3/2017, 1/5/2016, 12/3/2014    History: No symptoms, routine screening. Two relatives diagnosed with  breast cancer, youngest age of diagnosis in 50s or 60s. Currently on  hormone replacement therapy.    BREAST DENSITY: Heterogeneously dense.    COMMENTS: There has been no significant change.       Impression    IMPRESSION: BI-RADS CATEGORY: 1 -  NEGATIVE.    RECOMMENDED FOLLOW-UP: Annual Mammography.      Results will be sent to the patient.      I have personally reviewed the examination and initial interpretation  and I agree with the findings.    JANES ROBLEDO MD                Pathology:     Lab Results   Component Value Date    PATH  06/20/2019     Patient Name: ROSANNE ALVA  MR#: [id number]  Specimen #: [id number]  Collected: 6/20/2019  Received: 6/20/2019  Reported: 6/26/2019 11:04  Ordering Phy(s): LISA KEARNEY  Additional Phy(s): [facility]    For improved result formatting, select 'View Enhanced Report Format' under   Linked Documents section.    TEST(S):  1 slide, case #[id number]    FINAL DIAGNOSIS:  CASE FROM [facility], [place], [place] ([id number], OBTAINED   05/30/2019):  Skin, left anterior lower leg, shave removal:  - Melanoma in situ, narrowly excised in the planes examined - (see comment   and description)    COMMENT:  Reexcision is recommended this site.  I have personally reviewed all specimens and/or slides, including the   listed special stains, and used them  with my medical judgement to determine or confirm the final diagnosis.    Electronically signed out by:    Edgard Sun M.D., [facility]    CLINICAL HISTORY:  The patient is a 53-year-old female.    GROSS:  Received from [facility] in [place], [place] is 1 stained slide   labeled [id number] (obtained  05/31/2019)  and a copy of the referring pathologist's report with patient identifying   information.  All slides are  returned.    MICROSCOPIC:  On a background of sun damaged skin, there is a poorly defined junctional   melanocytic proliferation comprised  of moderate to severely cytologically atypical melanocytes demonstrating   confluent junctional growth  predominantly as single cells and focally collecting into small irregular   nests. The lesion closely  approximates, but does not extend to, the lateral margin.    The technical component of this testing was completed at the [facility], with the professional component performed   at the [facility], [address] [place] [phone] ([phone])    CPT Codes:  A: 23513-UKB6    COLLECTION SITE:  Client:  [facility]  Location:  [facility] (B)                      Assessment :   # Melanoma in site w/ incomplete surgical margin (< 1mm) 5/31/19    The patient was recently found melanoma in situ in left anterior lower leg from shave removal. The biopsy showed margin negative, but < 1mm in the plane of section examined. Re-excision with 0.5 cm clear margin is recommended. She will return to her local dermatologist to have a complete excision. At this point, there is no further treatment required. Continue the surveillance with her dermatologist.    # Multiple family members affected by cancers  Pending a consult with genetic counselor.    Patient was seen, care plan discussed and staffed with Dr. Beckford.    Se jatinder Valdez MD  [facility]  Hematology oncology and transplantation fellow  Pager [phone]      ---  I evaluated the patient and reviewed the plan of care with the patient and the Oncology Fellow. I agree with the assessment and plan documented in the clinical note.    Patient had a  melanoma in situ lesion removed from the left leg. She should have complete excision. No need for adjuvant therapies or oncologic follow-up.    Ilya Calvo M.D.   of Medicine  Hematology, Oncology and Transplantation  [facility]

## 2019-06-28 ENCOUNTER — PRE VISIT (OUTPATIENT)
Dept: ONCOLOGY | Facility: CLINIC | Age: 54
End: 2019-06-28

## 2019-06-28 ENCOUNTER — ONCOLOGY VISIT (OUTPATIENT)
Dept: ONCOLOGY | Facility: CLINIC | Age: 54
End: 2019-06-28
Attending: INTERNAL MEDICINE
Payer: COMMERCIAL

## 2019-06-28 VITALS
HEART RATE: 61 BPM | HEIGHT: 68 IN | RESPIRATION RATE: 16 BRPM | WEIGHT: 209.3 LBS | OXYGEN SATURATION: 97 % | DIASTOLIC BLOOD PRESSURE: 73 MMHG | SYSTOLIC BLOOD PRESSURE: 115 MMHG | TEMPERATURE: 97.6 F | BODY MASS INDEX: 31.72 KG/M2

## 2019-06-28 DIAGNOSIS — D03.72 MELANOMA IN SITU OF LEFT LOWER LEG (H): Primary | ICD-10-CM

## 2019-06-28 PROCEDURE — 99204 OFFICE O/P NEW MOD 45 MIN: CPT | Mod: GC | Performed by: INTERNAL MEDICINE

## 2019-06-28 PROCEDURE — G0463 HOSPITAL OUTPT CLINIC VISIT: HCPCS | Mod: ZF

## 2019-06-28 ASSESSMENT — MIFFLIN-ST. JEOR: SCORE: 1597.75

## 2019-06-28 ASSESSMENT — PAIN SCALES - GENERAL: PAINLEVEL: NO PAIN (0)

## 2019-06-28 NOTE — LETTER
2019       RE: Nkechi Manrique  [address] [address]     Dear Colleague,    Thank you for referring your patient, Nkechi Manrique, to the [facility] CANCER [facility]INIC. Please see a copy of my visit note below.    [facility] CANCER CLINIC AT [facility]  HEMATOLOGY AND ONCOLOGY  NEW PATIENT VISIT    NAME: Nkechi Manrique RADHA: 2019   MRN: [id number]      REFERRING PHYSICIAN: Referred Self       Chief complaint:   Recent diagnosis of melanoma in situ         History of Present Illness:   Nkechi Manrique is a 53 year old female who was recently found melanoma in situ in her left lower extremity. She states that she missed her annual dermatologic exam 2 years ago and noticed a change of mole in her left lower anterior lower extremity, which became larger and bigger. When she had an annual total body skin exam with dermatologist (Dr. Pamela Hawley) 19, the lesion in left lower leg was measured 8 mm irregular brown macule with atypia on dermoscopy. Pathology from shave biopsy on 19 showed melanoma in situ with negative margin, but < 1mm in the plane of section examined. Thus, re-excision with 0.5 cm clear margin is recommended.    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    Her family history is significant for the following:  Brother - pancreatic cancer,  at age of 25  Brother - Thymus cancer  Sister -  from non cancer related medical problem  Maternal grandmother - breast cancer   No family history of skin cancer    She already scheduled an appointment with a genetic counselor.         Review of Systems:   ROS: Comprehensive 10 point ROS negative except for that detailed above.         Past Medical History:     Past Medical History:   Diagnosis Date     Abnormal Pap smear     Biopsy- ok     Anxiety      Blood dyscrasia     Factor 5 Leiden  HTZ     Breast lump 9/27/2013     CMC arthritis, thumb, degenerative 11/3/2015     Congenital deficiency of other clotting factors     Factor V Leiden positive, had superficial thrombus, no DVT     Mild intermittent asthma with exacerbation     seasonally related, rare albuterol     Plantar fascia syndrome     L ft in '10, R ft in '11     Seasonal allergic rhinitis     claritin spring/August     Stress fracture of lower leg ~2002, 2009     Varicosities 2015    Varicose veins removed              Past Surgical History:     Past Surgical History:   Procedure Laterality Date     BIOPSY  1998 2000 2017    Skin tags moles cysts     COLONOSCOPY  2015    Polyp removed     PHLEBECTOMY MULTIPLE STAB  10/15    [facility], Dr. Ramirez            Social History:     Social History     Socioeconomic History     Marital status: Single     Spouse name: Not on file     Number of children: Not on file     Years of education: Not on file     Highest education level: Not on file   Occupational History     Employer: [employer]     Comment: Birthplace   Social Needs     Financial resource strain: Not on file     Food insecurity:     Worry: Not on file     Inability: Not on file     Transportation needs:     Medical: Not on file     Non-medical: Not on file   Tobacco Use     Smoking status: Never Smoker     Smokeless tobacco: Never Used   Substance and Sexual Activity     Alcohol use: Yes     Alcohol/week: 0.6 oz     Comment: 2 drinks per week     Drug use: No     Sexual activity: Yes     Partners: Male     Birth control/protection: Post-menopausal, Male Surgical   Lifestyle     Physical activity:     Days per week: Not on file     Minutes per session: Not on file     Stress: Not on file   Relationships     Social connections:     Talks on phone: Not on file     Gets together: Not on file     Attends Mosque service: Not on file     Active member of club or organization: Not on file     Attends meetings of clubs or  "organizations: Not on file     Relationship status: Not on file     Intimate partner violence:     Fear of current or ex partner: Not on file     Emotionally abused: Not on file     Physically abused: Not on file     Forced sexual activity: Not on file   Other Topics Concern     Not on file   Social History Narrative    Caffeine intake/servings daily - 2-3    Calcium intake/servings daily - 2-3    Exercise 6-7 times weekly - describe biking, swimming, wts    Sunscreen used - Yes    Seatbelts used - Yes    Guns stored in the home - No    Self Breast Exam - No    Pap test up to date -  Yes    Eye exam up to date -  Yes    Dental exam up to date -  Yes    DEXA scan up to date -  Not Applicable    Flex Sig/Colonoscopy up to date -  Not Applicable    Mammography up to date -  Yes    Immunizations reviewed and up to date - Yes    Abuse: Current or Past (Physical, Sexual or Emotional) - No    Do you feel safe in your environment - Yes    Do you cope well with stress - Yes    Do you suffer from insomnia - No    Rosy Lam LPN      '10                                    Family History:     Family History   Problem Relation Age of Onset     Hypertension Mother         meds     Depression Mother      Cerebrovascular Disease Father      Circulatory Father         phlebitis, blood clots in leg veins     Alzheimer Disease Father         dx ~ in mid 60s     Breast Cancer Maternal Grandmother         postmenopausal     Cancer Brother         pancreas,  age 25     Psychotic Disorder Sister         ? depression, schizophrenia     Other Cancer Brother         Pancreatic     Cancer Brother         Thymus Cancer--\"Goods Symdrone\"     Genetic Disorder Brother         Autoimmune IGG            Allergies:     Allergies   Allergen Reactions     Septra [Bactrim] Hives     Wasps [Hornets] Swelling     Significant leg swelling- epi pen rec in case worsening sx's            Home Medications:     Current Outpatient Medications " "  Medication     albuterol (ALBUTEROL) 108 (90 BASE) MCG/ACT inhaler     CLARITIN OR     EPINEPHrine (EPIPEN/ADRENACLICK/OR ANY BX GENERIC EQUIV) 0.3 MG/0.3ML injection 2-pack     estradiol (ESTRACE VAGINAL) 0.1 MG/GM cream     fluticasone (FLONASE) 50 MCG/ACT nasal spray     PROGESTERONE 100MG CAPSULES COMPOUND     RaNITidine HCl (ZANTAC PO)     vitamin B complex with vitamin C (VITAMIN  B COMPLEX) TABS tablet     VITAMIN D, CHOLECALCIFEROL, PO     No current facility-administered medications for this visit.             Physical Exam:     /73 (BP Location: Right arm, Patient Position: Sitting, Cuff Size: Adult Large)   Pulse 61   Temp 97.6  F (36.4  C) (Oral)   Resp 16   Ht 1.727 m (5' 7.99\")   Wt 94.9 kg (209 lb 4.8 oz)   LMP 10/16/2015 (Exact Date)   SpO2 97%   BMI 31.83 kg/m       ECO   General:  no acute distress.  Skin: No concerning lesions or rash on exposed surfaces. Well healed biopsy site in the left anterior lower extremity  HEENT: NCAT. anicteric sclera. Oral mucosa pink and moist with no lesions or thrush.  Neck: Neck supple.  Respiratory: Non-labored breathing, good air exchange, lungs clear to auscultation bilaterally.  Cardiovascular: Regular rate and rhythm. No murmur or rub.   Abdomen: Normoactive bowel sounds. Abdomen soft, non-distended, and non-tender.   Extremities: grossly normal, non-tender, no edema. Good strength and ROM.  Neurologic: A&O x 3, CNs 2-12 grossly intact, speech normal. Grossly non-focal.   Psychiatric: Mentation and affect appear normal.           Data:          Labs:   CBC   Lab Results   Component Value Date/Time    WBC 8.6 2017 03:50 PM    HGB 13.7 2017 03:50 PM     2017 03:50 PM      BMP   Lab Results   Component Value Date/Time     2017 03:50 PM    POTASSIUM 4.0 2017 03:50 PM    BUN 13 2017 03:50 PM    CR 0.79 2017 03:50 PM    KIM 9.2 2017 03:50 PM      LFTs   Lab Results   Component Value " Date/Time    BILITOTAL 0.4 06/29/2017 03:50 PM    ALKPHOS 77 06/29/2017 03:50 PM    AST 21 06/29/2017 03:50 PM    ALT 26 06/29/2017 03:50 PM                  Images:     Results for orders placed or performed in visit on 02/19/19   MA Screen Bilateral w/Alfredo    Narrative    Examination: Bilateral digital screening mammography with  tomosynthesis and computer aided detection.    TECHNIQUE: Tomosynthesis    Comparison: Mammograms 2/13/2018, 2/3/2017, 1/5/2016, 12/3/2014    History: No symptoms, routine screening. Two relatives diagnosed with  breast cancer, youngest age of diagnosis in 50s or 60s. Currently on  hormone replacement therapy.    BREAST DENSITY: Heterogeneously dense.    COMMENTS: There has been no significant change.       Impression    IMPRESSION: BI-RADS CATEGORY: 1 -  NEGATIVE.    RECOMMENDED FOLLOW-UP: Annual Mammography.      Results will be sent to the patient.      I have personally reviewed the examination and initial interpretation  and I agree with the findings.    JANES ROBLEDO MD                Pathology:     Lab Results   Component Value Date    PATH  06/20/2019     Patient Name: ROSANNE ALVA  MR#: [id number]  Specimen #: [id number]  Collected: 6/20/2019  Received: 6/20/2019  Reported: 6/26/2019 11:04  Ordering Phy(s): LISA KEARNEY  Additional Phy(s): [facility]    For improved result formatting, select 'View Enhanced Report Format' under   Linked Documents section.    TEST(S):  1 slide, case #[id number]    FINAL DIAGNOSIS:  CASE FROM [facility], [place], [place] ([id number], OBTAINED   05/30/2019):  Skin, left anterior lower leg, shave removal:  - Melanoma in situ, narrowly excised in the planes examined - (see comment   and description)    COMMENT:  Reexcision is recommended this site.  I have personally reviewed all specimens and/or slides, including the   listed special stains, and used them  with my medical judgement to determine or confirm the final  diagnosis.    Electronically signed out by:    Edgard Sun M.D., [facility]    CLINICAL HISTORY:  The patient is a 53-year-old female.    GROSS:  Received from [facility] in [place], [place] is 1 stained slide   labeled [id number] (obtained 05/31/2019)  and a copy of the referring pathologist's report with patient identifying   information.  All slides are  returned.    MICROSCOPIC:  On a background of sun damaged skin, there is a poorly defined junctional   melanocytic proliferation comprised  of moderate to severely cytologically atypical melanocytes demonstrating   confluent junctional growth  predominantly as single cells and focally collecting into small irregular   nests. The lesion closely  approximates, but does not extend to, the lateral margin.    The technical component of this testing was completed at the [facility], with the professional component performed   at the [facility], [address] [place] [phone] ([phone])    CPT Codes:  A: 90310-TNB0    COLLECTION SITE:  Client:  [facility]  Location:  [facility] (B)                      Assessment :   # Melanoma in site w/ incomplete surgical margin (< 1mm) 5/31/19    The patient was recently found melanoma in situ in left anterior lower leg from shave removal. The biopsy showed margin negative, but < 1mm in the plane of section examined. Re-excision with 0.5 cm clear margin is recommended. She will return to her local dermatologist to have a complete excision. At this point, there is no further treatment required. Continue the surveillance with her dermatologist.    # Multiple family members affected by cancers  Pending a consult with genetic counselor.    Patient was seen, care plan discussed and staffed with Dr. Beckford.    Se jatinder Valdez MD  [facility]  [place]  Hematology oncology and transplantation fellow  Pager [phone]      ---  I evaluated the patient and reviewed the plan of care with the patient and the Oncology Fellow. I agree with the assessment and plan documented in the clinical note.    Patient had a melanoma in situ lesion removed from the left leg. She should have complete excision. No need for adjuvant therapies or oncologic follow-up.    Ilya Calvo M.D.   of Medicine  Hematology, Oncology and Transplantation  [facility]

## 2019-06-28 NOTE — NURSING NOTE
"Oncology Rooming Note    June 28, 2019 1:00 PM   Nkechi Manrique is a 53 year old female who presents for:    Chief Complaint   Patient presents with     Oncology Clinic Visit     lauri      Initial Vitals: LMP 10/16/2015 (Exact Date)  Estimated body mass index is 31.78 kg/m  as calculated from the following:    Height as of 6/12/19: 1.727 m (5' 8\").    Weight as of 6/12/19: 94.8 kg (209 lb). There is no height or weight on file to calculate BSA.  Data Unavailable Comment: Data Unavailable   Patient's last menstrual period was 10/16/2015 (exact date).  Allergies reviewed: Yes  Medications reviewed: Yes    Medications: Medication refills not needed today.  Pharmacy name entered into EPIC:    [facility]  [facility] [place], [place] - [address] [facility] [address] [place], [place] - [address]   [id number] [place], [place] - [address] [facility] [address]  [facility] [place] [place] - [address]     Clinical concerns: none        Mary Jorge, CMA              "

## 2019-07-02 ENCOUNTER — PRE VISIT (OUTPATIENT)
Dept: ONCOLOGY | Facility: CLINIC | Age: 54
End: 2019-07-02

## 2019-07-03 ENCOUNTER — OFFICE VISIT (OUTPATIENT)
Dept: PSYCHOLOGY | Facility: CLINIC | Age: 54
End: 2019-07-03
Payer: COMMERCIAL

## 2019-07-03 DIAGNOSIS — F41.9 ANXIETY DISORDER, UNSPECIFIED TYPE: Primary | ICD-10-CM
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
## 2019-07-18 ENCOUNTER — TRANSFERRED RECORDS (OUTPATIENT)
Dept: HEALTH INFORMATION MANAGEMENT | Facility: CLINIC | Age: 54
End: 2019-07-18

## 2019-07-22 ENCOUNTER — ONCOLOGY VISIT (OUTPATIENT)
Dept: ONCOLOGY | Facility: CLINIC | Age: 54
End: 2019-07-22
Attending: GENETIC COUNSELOR, MS
Payer: COMMERCIAL

## 2019-07-22 DIAGNOSIS — Z80.0 FAMILY HISTORY OF COLON CANCER: ICD-10-CM

## 2019-07-22 DIAGNOSIS — Z80.3 FAMILY HISTORY OF MALIGNANT NEOPLASM OF BREAST: ICD-10-CM

## 2019-07-22 DIAGNOSIS — Z80.0 FAMILY HISTORY OF PANCREATIC CANCER: Primary | ICD-10-CM

## 2019-07-22 DIAGNOSIS — Z80.8 FAMILY HISTORY OF MELANOMA: ICD-10-CM

## 2019-07-22 DIAGNOSIS — Z80.8 FAMILY HISTORY OF BRAIN CANCER: ICD-10-CM

## 2019-07-22 LAB — MISCELLANEOUS TEST: NORMAL

## 2019-07-22 PROCEDURE — 96040 ZZH GENETIC COUNSELING, EACH 30 MINUTES: CPT | Performed by: GENETIC COUNSELOR, MS

## 2019-07-22 NOTE — LETTER
Cancer Risk Management  Program Locations    [facility]  Mailing Address  Cancer Risk Management Program  [facility]  [address] [facility] [address]  [place] [address]    New patient appointments  [phone]  July 24, 2019    Nkechi Manrique  [address] [id number]      Dear Nkechi,    It was a pleasure meeting with you at [facility] on 7/22/2019.  Here is a copy of the progress note from your recent genetic counseling visit to the Cancer Risk Management Program.  If you have any additional questions, please feel free to call.    Referring Provider: Kiki Liao MD    Presenting Information:   I met with Nkechitano Manrique today for genetic counseling at the Cancer Risk Management Program at the [facility] to discuss her personal and family history of melanoma, pancreatic cancer, thymus cancer and breast cancer.  She is here today to review this history, cancer screening recommendations, and available genetic testing options.    Personal History:  Nkechi is a 53 year old female. She was diagnosed with melanoma in situ; treatment included excision of the site.      She had her first menstrual period at age 12 and is post-menopausal.  Nkechi has her ovaries, fallopian tubes and uterus in place, and she has not had ovarian cancer screening to date. She reports that she was on hormone replacement therapy for 2 years.     She has had clinical breast exams and mammograms; her most recent mammogram in 2019 was normal.  Her most recent colonoscopy in 2015 found one benign sessile polyp (9mm) in the ascending colon-proximal and follow-up was recommended in five years.  She does not regularly do any other cancer screening at this time.     Family History: (Please see  scanned pedigree for detailed family history information)    Her brother  at 25 from pancreatic cancer.    Another brother was diagnosed at 54 with Good's Syndrome and had a malignant thymoma.     Her maternal aunt had breast cancer likely post-menopausal and also had an unspecified type of neck cancer.     Her maternal uncle had prostate cancer at 67.    Her maternal grandfather had colon cancer at an unknown age.    Her paternal uncle had bone cancer before he  at 75. His son had advanced melanoma and  in his 40's.     Another paternal uncle had brain cancer and  at 52. His daughter also had brain cancer and  in her 50's.    Her maternal ethnicity is French, Indian and [place]. Her paternal ethnicity is Indian.  There is no known Ashkenazi Zoroastrianism ancestry on either side of her family. There is no reported consanguinity.    Discussion:    Nkechi's personal and family history of pancreatic and breast cancer, melanoma, colon cancer, malignant thymoma, and brain cancer is suggestive of a hereditary cancer syndrome. In looking at Nkechi's family history, it is possible that a cancer susceptibility gene is present due to the above cancers.  We reviewed the features of sporadic, familial, and hereditary cancers and discussed the natural history and genetics of pancreatic, breast, melanoma, thymoma and colon cancer.   We discussed Hereditary Breast and Ovarian Cancer (HBOC), which is caused by a mutation in the BRCA1 or BRCA2 gene.  HBOC typically presents with multiple family members diagnosed with breast cancer before age 50 and/or ovarian cancer. Other cancer risks associated with HBOC include male breast cancer, prostate cancer, pancreatic cancer, and melanoma.   We discussed Renteria syndrome, a condition that can be caused by a mutation in one of five genes:  MLH1, MSH2, MSH6, PMS2, and EPCAM.  Renteria syndrome may present with polyps, but typically a small number.  The highest cancer risks associated  with Renteria syndrome include colon cancer, endometrial/uterine cancer, gastric cancer, and ovarian cancer.    A detailed handout regarding HBOC, Renteria syndrome, and the information we discussed was provided to Nkechi at the end of our appointment today and can be found in the after visit summary. Topics included: inheritance pattern, cancer risks, cancer screening recommendations, and also risks, benefits and limitations of testing.    Based on her personal and family history, Nkechi meets current National Comprehensive Cancer Network (NCCN) criteria for genetic testing of BRCA1 and BRCA2.      We discussed that there are additional genes that could cause increased risk for pancreatic, breast, melanoma, colon and brain cancer. As many of these genes present with overlapping features in a family and accurate cancer risk cannot always be established based upon the pedigree analysis alone, it would be reasonable for Nkechi to consider panel genetic testing to analyze multiple genes at once.    Nkechi indicated that she wanted to know as much information as possible for peace of mind. Genetic testing is available for 67 genes associated with increased risk for many different cancers: CancerNext-Expanded (AIP, ALK, APC, VICTOR M, BAP1, BARD1, BLM, BRCA1, BRCA2, BRIP1, BMPR1A, CDH1, CDK4, CDKN1B, CDKN2A, CHEK2, DICER1, EPCAM, FANCC, FH, FLCN, GALNT12, GREM1, HOXB13, MAX, MEN1, MET, MITF, MLH1, MRE11A, MSH2, MSH6, MUTYH, NBN, NF1, NF2, PALB2, PHOX2B, PMS2, POLD1, POLE, POT1, OTYAT2P, PTCH1, PTEN, RAD50, RAD51C, RAD51D, RB1, RET, SDHA, SDHAF2, SDHB, SDHC, SDHD, SMAD4, SMARCA4, SMARCB1, SMARCE1, STK11, SUFU, [id number], TP53, TSC1, TSC2, VHL, and XRCC2).  We discussed that many of the genes in the CancerNext-Expanded panel are associated with specific hereditary cancer syndromes and have published management guidelines:  Hereditary Breast and Ovarian Cancer syndrome (BRCA1, BRCA2), Renteria syndrome (MLH1, MSH2, MSH6, PMS2, EPCAM),  Familial Adenomatous Polyposis (APC), Hereditary Diffuse Gastric Cancer (CDH1), Familial Atypical Multiple Mole Melanoma syndrome (CDK4, CDKN2A), Juvenile Polyposis syndrome (BMPR1A, SMAD4), Cowden syndrome (PTEN), Li Fraumeni syndrome (TP53), Peutz-Jeghers syndrome (STK11), MUTYH Associated Polyposis (MUTYH), Hereditary Leiomyomatosis and Renal Cell Cancer (FH), Bper-Gplj-Fiue (FLCN), Hereditary Papillary Renal Carcinoma (MET), Hereditary Paraganglioma and Pheochromocytoma syndrome (SDHA, SDHAF2, SDHB, SDHC, SDHD), Multiple Endocrine Neoplasia type 2 (RET), Tuberous sclerosis complex (TSC1, TSC2), Von Hippel-Lindau disease (VHL), Neurofibromatosis type 1 (NF1), Neurofibromatosis type 2 (NF1), Multiple Endocrine Neoplasia type 1 (MEN1), Multiple Endocrine Neoplasia type 4 (CDKN1B), Gorlin syndrome (SUFU, PTCH1), and Benton complex ([id number]).  The VICTOR M, BRIP1, CHEK2, GREM1, NBN, PALB2, POLD1, POLE, RAD51C, and RAD51D genes are associated with increased cancer risk and have published management guidelines for certain cancers.    The remaining genes (AIP, ALK, BAP1, BARD1, BLM, DICER1, FANCC, GALNT12, HOXB13, MRE11A, MAX, MET, MITF, PHOX2B, POT1, RAD50, SMARCA4, SMARCB1, SMARCE1, [id number], and XRCC2) are associated with increased cancer risk and may allow us to make medical recommendations when mutations are identified.    Nkechi was provided with a detailed brochure from [facility] explaining the CancerNext-Expanded testing.  Consent was obtained and genetic testing for CancerNext-Expanded was sent to [facility]. Turn around time: 4-6 weeks.     Medical Management: For Nkechi, we reviewed that the information from genetic testing may determine:    additional cancer screening for which Nkechi may qualify (i.e. mammogram and breast MRI, more frequent colonoscopies, more frequent dermatologic exams, etc.),    options for risk reducing surgeries Nkechi could consider (i.e. bilateral mastectomy, surgery to  remove her ovaries and/or uterus, etc.),      and targeted chemotherapies for Nkechi's if she were to develop certain cancers in the future (i.e. immunotherapy for individuals with Renteria syndrome, PARP inhibitors, etc.).     These recommendations and possible targeted chemotherapies will be discussed in detail once genetic testing is completed.     Plan:  1) Today Nkechi elected to proceed with CancerNext-Expanded through [facility].  2) This information should be available in 4-6 weeks.  3) Nkechi will return to clinic to discuss the results or make a scheduled phone call.    Maria De Jesus Lara MS  Genetic counselor

## 2019-07-22 NOTE — PROGRESS NOTES
2019    Referring Provider: Kiki Liao MD    Presenting Information:   I met with Nkechi Manrique today for genetic counseling at the Cancer Risk Management Program at the [facility] to discuss her personal and family history of melanoma, pancreatic cancer, thymus cancer and breast cancer.  She is here today to review this history, cancer screening recommendations, and available genetic testing options.    Personal History:  Nkechi is a 53 year old female. She was diagnosed with melanoma in situ; treatment included excision of the site.      She had her first menstrual period at age 12 and is post-menopausal.  Nkechi has her ovaries, fallopian tubes and uterus in place, and she has not had ovarian cancer screening to date. She reports that she was on hormone replacement therapy for 2 years.     She has had clinical breast exams and mammograms; her most recent mammogram in  was normal.  Her most recent colonoscopy in  found one benign sessile polyp (9mm) in the ascending colon-proximal and follow-up was recommended in five years.  She does not regularly do any other cancer screening at this time. Nkechi reported no issues with tobacco use or alcohol use.    Family History: (Please see scanned pedigree for detailed family history information)    Her brother  at 25 from pancreatic cancer.    Another brother was diagnosed at 54 with Good's Syndrome and had a malignant thymoma.     Her maternal aunt had breast cancer likely post-menopausal and also had an unspecified type of neck cancer.     Her maternal uncle had prostate cancer at 67.    Her maternal grandfather had colon cancer at an unknown age.    Her paternal uncle had bone cancer before he  at 75. His son had advanced melanoma and  in his 40's.     Another paternal uncle had brain cancer and  at 52. His daughter also had brain cancer and  in her 50's.    Her maternal ethnicity is Nigerian, Kosovan and Danish.  Her paternal ethnicity is [place]aim[place].  There is no known Ashkenazi Mu-ism ancestry on either side of her family. There is no reported consanguinity.    Discussion:    Nkechi's personal and family history of pancreatic and breast cancer, melanoma, colon cancer, malignant thymoma, and brain cancer is suggestive of a hereditary cancer syndrome. In looking at Nkechi's family history, it is possible that a cancer susceptibility gene is present due to the above cancers.  We reviewed the features of sporadic, familial, and hereditary cancers and discussed the natural history and genetics of pancreatic, breast, melanoma, thymoma and colon cancer.   We discussed Hereditary Breast and Ovarian Cancer (HBOC), which is caused by a mutation in the BRCA1 or BRCA2 gene.  HBOC typically presents with multiple family members diagnosed with breast cancer before age 50 and/or ovarian cancer. Other cancer risks associated with HBOC include male breast cancer, prostate cancer, pancreatic cancer, and melanoma.   We discussed Renteria syndrome, a condition that can be caused by a mutation in one of five genes:  MLH1, MSH2, MSH6, PMS2, and EPCAM.  Renteria syndrome may present with polyps, but typically a small number.  The highest cancer risks associated with Renteria syndrome include colon cancer, endometrial/uterine cancer, gastric cancer, and ovarian cancer.    A detailed handout regarding HBOC, Renteria syndrome, and the information we discussed was provided to Nkechi at the end of our appointment today and can be found in the after visit summary. Topics included: inheritance pattern, cancer risks, cancer screening recommendations, and also risks, benefits and limitations of testing.    Based on her personal and family history, Nkechi meets current National Comprehensive Cancer Network (NCCN) criteria for genetic testing of BRCA1 and BRCA2.      We discussed that there are additional genes that could cause increased risk for pancreatic, breast,  melanoma, colon and brain cancer. As many of these genes present with overlapping features in a family and accurate cancer risk cannot always be established based upon the pedigree analysis alone, it would be reasonable for Nkechi to consider panel genetic testing to analyze multiple genes at once.    Nkechi indicated that she wanted to know as much information as possible for peace of mind. Genetic testing is available for 67 genes associated with increased risk for many different cancers: CancerNext-Expanded (AIP, ALK, APC, VICTOR M, BAP1, BARD1, BLM, BRCA1, BRCA2, BRIP1, BMPR1A, CDH1, CDK4, CDKN1B, CDKN2A, CHEK2, DICER1, EPCAM, FANCC, FH, FLCN, GALNT12, GREM1, HOXB13, MAX, MEN1, MET, MITF, MLH1, MRE11A, MSH2, MSH6, MUTYH, NBN, NF1, NF2, PALB2, PHOX2B, PMS2, POLD1, POLE, POT1, GIDEG6X, PTCH1, PTEN, RAD50, RAD51C, RAD51D, RB1, RET, SDHA, SDHAF2, SDHB, SDHC, SDHD, SMAD4, SMARCA4, SMARCB1, SMARCE1, STK11, SUFU, FHKC102, TP53, TSC1, TSC2, VHL, and XRCC2).  We discussed that many of the genes in the CancerNext-Expanded panel are associated with specific hereditary cancer syndromes and have published management guidelines:  Hereditary Breast and Ovarian Cancer syndrome (BRCA1, BRCA2), Renteria syndrome (MLH1, MSH2, MSH6, PMS2, EPCAM), Familial Adenomatous Polyposis (APC), Hereditary Diffuse Gastric Cancer (CDH1), Familial Atypical Multiple Mole Melanoma syndrome (CDK4, CDKN2A), Juvenile Polyposis syndrome (BMPR1A, SMAD4), Cowden syndrome (PTEN), Li Fraumeni syndrome (TP53), Peutz-Jeghers syndrome (STK11), MUTYH Associated Polyposis (MUTYH), Hereditary Leiomyomatosis and Renal Cell Cancer (FH), Mvay-Zaks-Knfs (FLCN), Hereditary Papillary Renal Carcinoma (MET), Hereditary Paraganglioma and Pheochromocytoma syndrome (SDHA, SDHAF2, SDHB, SDHC, SDHD), Multiple Endocrine Neoplasia type 2 (RET), Tuberous sclerosis complex (TSC1, TSC2), Von Hippel-Lindau disease (VHL), Neurofibromatosis type 1 (NF1), Neurofibromatosis type 2 (NF1),  Multiple Endocrine Neoplasia type 1 (MEN1), Multiple Endocrine Neoplasia type 4 (CDKN1B), Gorlin syndrome (SUFU, PTCH1), and Benton complex (NYTIA2I).  The VICTOR M, BRIP1, CHEK2, GREM1, NBN, PALB2, POLD1, POLE, RAD51C, and RAD51D genes are associated with increased cancer risk and have published management guidelines for certain cancers.    The remaining genes (AIP, ALK, BAP1, BARD1, BLM, DICER1, FANCC, GALNT12, HOXB13, MRE11A, MAX, MET, MITF, PHOX2B, POT1, RAD50, SMARCA4, SMARCB1, SMARCE1, [id number], and XRCC2) are associated with increased cancer risk and may allow us to make medical recommendations when mutations are identified.    Nkechi was provided with a detailed brochure from [facility] explaining the CancerNext-Expanded testing.  Consent was obtained and genetic testing for CancerNext-Expanded was sent to [facility]. Turn around time: 4-6 weeks.     Medical Management: For Nkechi, we reviewed that the information from genetic testing may determine:    additional cancer screening for which Nkechi may qualify (i.e. mammogram and breast MRI, more frequent colonoscopies, more frequent dermatologic exams, etc.),    options for risk reducing surgeries Nkechi could consider (i.e. bilateral mastectomy, surgery to remove her ovaries and/or uterus, etc.),      and targeted chemotherapies for Nkechi's if she were to develop certain cancers in the future (i.e. immunotherapy for individuals with Renteria syndrome, PARP inhibitors, etc.).     These recommendations and possible targeted chemotherapies will be discussed in detail once genetic testing is completed.     Plan:  1) Today Nkechi elected to proceed with CancerNext-Expanded through [facility].  2) This information should be available in 4-6 weeks.  3) Nkechi will return to clinic to discuss the results or make a scheduled phone call.    Face to face time: 45 minutes    Maria De Jesus Lara MS  Genetic counselor    Attestation: Patient seen, evaluated and  discussed with the Genetic Counseling Intern. I have verified the content of the note, which accurately reflects my assessment of the patient and the plan of care.    Supervising Genetic Counselor  Yu Hsieh MS, [facility]  Licensed Genetic Counselor  [phone]                   Statement Selected

## 2019-07-22 NOTE — LETTER
2019         RE: Nkechi Manrique  [address] [facility] [address]        Dear Colleague,    Thank you for referring your patient, Nkechi Manrique, to the CANCER RISK MANAGEMENT PROGRAM. Please see a copy of my visit note below.    2019    Referring Provider: Kiki Liao MD    Presenting Information:   I met with Nkechi Manrique today for genetic counseling at the Cancer Risk Management Program at the [facility] to discuss her personal and family history of melanoma, pancreatic cancer, thymus cancer and breast cancer.  She is here today to review this history, cancer screening recommendations, and available genetic testing options.    Personal History:  Nkechi is a 53 year old female. She was diagnosed with melanoma in situ; treatment included excision of the site.      She had her first menstrual period at age 12 and is post-menopausal.  Nkechi has her ovaries, fallopian tubes and uterus in place, and she has not had ovarian cancer screening to date. She reports that she was on hormone replacement therapy for 2 years.     She has had clinical breast exams and mammograms; her most recent mammogram in  was normal.  Her most recent colonoscopy in  found one benign sessile polyp (9mm) in the ascending colon-proximal and follow-up was recommended in five years.  She does not regularly do any other cancer screening at this time. Nkechi reported no issues with tobacco use or alcohol use.    Family History: (Please see scanned pedigree for detailed family history information)    Her brother  at 25 from pancreatic cancer.    Another brother was diagnosed at 54 with Good's Syndrome and had a malignant thymoma.     Her maternal aunt had breast cancer likely post-menopausal and also had an unspecified type of neck cancer.     Her maternal uncle had prostate cancer at 67.    Her maternal grandfather had colon cancer at an unknown age.    Her paternal uncle had bone  cancer before he  at 75. His son had advanced melanoma and  in his 40's.     Another paternal uncle had brain cancer and  at 52. His daughter also had brain cancer and  in her 50's.    Her maternal ethnicity is Faroese, Nepali and [place] African. Her paternal ethnicity is Nepali.  There is no known Ashkenazi Quaker ancestry on either side of her family. There is no reported consanguinity.    Discussion:    Nkechi's personal and family history of pancreatic and breast cancer, melanoma, colon cancer, malignant thymoma, and brain cancer is suggestive of a hereditary cancer syndrome. In looking at Nkechi's family history, it is possible that a cancer susceptibility gene is present due to the above cancers.  We reviewed the features of sporadic, familial, and hereditary cancers and discussed the natural history and genetics of pancreatic, breast, melanoma, thymoma and colon cancer.   We discussed Hereditary Breast and Ovarian Cancer (HBOC), which is caused by a mutation in the BRCA1 or BRCA2 gene.  HBOC typically presents with multiple family members diagnosed with breast cancer before age 50 and/or ovarian cancer. Other cancer risks associated with HBOC include male breast cancer, prostate cancer, pancreatic cancer, and melanoma.   We discussed Renteria syndrome, a condition that can be caused by a mutation in one of five genes:  MLH1, MSH2, MSH6, PMS2, and EPCAM.  Renteria syndrome may present with polyps, but typically a small number.  The highest cancer risks associated with Renteria syndrome include colon cancer, endometrial/uterine cancer, gastric cancer, and ovarian cancer.    A detailed handout regarding HBOC, Renteria syndrome, and the information we discussed was provided to Nkechi at the end of our appointment today and can be found in the after visit summary. Topics included: inheritance pattern, cancer risks, cancer screening recommendations, and also risks, benefits and limitations of testing.    Based on her  personal and family history, Nkechi meets current National Comprehensive Cancer Network (NCCN) criteria for genetic testing of BRCA1 and BRCA2.      We discussed that there are additional genes that could cause increased risk for pancreatic, breast, melanoma, colon and brain cancer. As many of these genes present with overlapping features in a family and accurate cancer risk cannot always be established based upon the pedigree analysis alone, it would be reasonable for Nkechi to consider panel genetic testing to analyze multiple genes at once.    Nkechi indicated that she wanted to know as much information as possible for peace of mind. Genetic testing is available for 67 genes associated with increased risk for many different cancers: CancerNext-Expanded (AIP, ALK, APC, VICTOR M, BAP1, BARD1, BLM, BRCA1, BRCA2, BRIP1, BMPR1A, CDH1, CDK4, CDKN1B, CDKN2A, CHEK2, DICER1, EPCAM, FANCC, FH, FLCN, GALNT12, GREM1, HOXB13, MAX, MEN1, MET, MITF, MLH1, MRE11A, MSH2, MSH6, MUTYH, NBN, NF1, NF2, PALB2, PHOX2B, PMS2, POLD1, POLE, POT1, LHNRO7T, PTCH1, PTEN, RAD50, RAD51C, RAD51D, RB1, RET, SDHA, SDHAF2, SDHB, SDHC, SDHD, SMAD4, SMARCA4, SMARCB1, SMARCE1, STK11, SUFU, KAUH292, TP53, TSC1, TSC2, VHL, and XRCC2).  We discussed that many of the genes in the CancerNext-Expanded panel are associated with specific hereditary cancer syndromes and have published management guidelines:  Hereditary Breast and Ovarian Cancer syndrome (BRCA1, BRCA2), Renteria syndrome (MLH1, MSH2, MSH6, PMS2, EPCAM), Familial Adenomatous Polyposis (APC), Hereditary Diffuse Gastric Cancer (CDH1), Familial Atypical Multiple Mole Melanoma syndrome (CDK4, CDKN2A), Juvenile Polyposis syndrome (BMPR1A, SMAD4), Cowden syndrome (PTEN), Li Fraumeni syndrome (TP53), Peutz-Jeghers syndrome (STK11), MUTYH Associated Polyposis (MUTYH), Hereditary Leiomyomatosis and Renal Cell Cancer (FH), Vokv-Palc-Iayw (FLCN), Hereditary Papillary Renal Carcinoma (MET), Hereditary  Paraganglioma and Pheochromocytoma syndrome (SDHA, SDHAF2, SDHB, SDHC, SDHD), Multiple Endocrine Neoplasia type 2 (RET), Tuberous sclerosis complex (TSC1, TSC2), Von Hippel-Lindau disease (VHL), Neurofibromatosis type 1 (NF1), Neurofibromatosis type 2 (NF1), Multiple Endocrine Neoplasia type 1 (MEN1), Multiple Endocrine Neoplasia type 4 (CDKN1B), Gorlin syndrome (SUFU, PTCH1), and Benton complex ([id number]).  The VICTOR M, BRIP1, CHEK2, GREM1, NBN, PALB2, POLD1, POLE, RAD51C, and RAD51D genes are associated with increased cancer risk and have published management guidelines for certain cancers.    The remaining genes (AIP, ALK, BAP1, BARD1, BLM, DICER1, FANCC, GALNT12, HOXB13, MRE11A, MAX, MET, MITF, PHOX2B, POT1, RAD50, SMARCA4, SMARCB1, SMARCE1, SARN719, and XRCC2) are associated with increased cancer risk and may allow us to make medical recommendations when mutations are identified.    Nkechi was provided with a detailed brochure from [facility] explaining the CancerNext-Expanded testing.  Consent was obtained and genetic testing for CancerNext-Expanded was sent to [facility]. Turn around time: 4-6 weeks.     Medical Management: For Nkechi, we reviewed that the information from genetic testing may determine:    additional cancer screening for which Nkechi may qualify (i.e. mammogram and breast MRI, more frequent colonoscopies, more frequent dermatologic exams, etc.),    options for risk reducing surgeries Nkechi could consider (i.e. bilateral mastectomy, surgery to remove her ovaries and/or uterus, etc.),      and targeted chemotherapies for Nkechi's if she were to develop certain cancers in the future (i.e. immunotherapy for individuals with Renteria syndrome, PARP inhibitors, etc.).     These recommendations and possible targeted chemotherapies will be discussed in detail once genetic testing is completed.     Plan:  1) Today Nkechi elected to proceed with CancerNext-Expanded through [facility].  2) This information should be available in 4-6 weeks.  3) Nkechi will return to clinic to discuss the results or make a scheduled phone call.    Face to face time: 45 minutes    Maria De Jesus Lara MS  Genetic counselor    Attestation: Patient seen, evaluated and discussed with the Genetic Counseling Intern. I have verified the content of the note, which accurately reflects my assessment of the patient and the plan of care.    Supervising Genetic Counselor  Yu Hsieh MS, [facility]  Licensed Genetic Counselor  [phone]                       Again, thank you for allowing me to participate in the care of your patient.        Sincerely,        Yu Hsieh, GC

## 2019-07-22 NOTE — PATIENT INSTRUCTIONS
Assessing Cancer Risk  Only about 5-10% of cancers are thought to be due to an inherited cancer susceptibility gene.    These families often have:    Several people with the same or related types of cancer    Cancers diagnosed at a young age (before age 50)    Individuals with more than one primary cancer    Multiple generations of the family affected with cancer    Some people may be candidates for genetic testing of more than one gene.  For these families, genetic testing using a cancer panel may be offered.  These panels will test different genes known to increase the risk for breast, ovarian, uterine, and/or other cancers. All of the genes discussed below have published clinical management guidelines for individuals who are found to carry a mutation. The purpose of this handout is to serve as a brief summary of the genes analyzed by the panels used to inquire about hereditary breast and gynecologic cancer:  VICTOR M, BRCA1, BRCA2, BRIP1, CDH1, CHEK2, MLH1, MSH2, MSH6, PMS2, EPCAM, PTEN, PALB2, RAD51C, RAD51D, and TP53.  ______________________________________________________________________________  Hereditary Breast and Ovarian Cancer Syndrome   (BRCA1 and BRCA2)  A single mutation in one of the copies of BRCA1 or BRCA2 increases the risk for breast and ovarian cancer, among others.  The risk for pancreatic cancer and melanoma may also be slightly increased in some families.  The chart below shows the chance that someone with a BRCA mutation would develop cancer in his or her lifetime1,2,3,4.        A person s ethnic background is also important to consider, as individuals of Ashkenazi Episcopalian ancestry have a higher chance of having a BRCA gene mutation.  There are three BRCA mutations that occur more frequently in this population.    Renteria Syndrome   (MLH1, MSH2, MSH6, PMS2, and EPCAM)  Currently five genes are known to cause Renteria Syndrome: MLH1, MSH2, MSH6, PMS2, and EPCAM.  A single mutation in one of the  Renteria Syndrome genes increases the risk for colon, endometrial, ovarian, and stomach cancers.  Other cancers that occur less commonly in Renteria Syndrome include urinary tract, skin, and brain cancers.  The chart below shows the chance that a person with Renteria syndrome would develop cancer in his or her lifetime5.      *Cancer risk varies depending on Renteria syndrome gene found    Cowden Syndrome   (PTEN)  Cowden syndrome is a hereditary condition that increases the risk for breast, thyroid, endometrial, colon, and kidney cancer.  Cowden syndrome is caused by a mutation in the PTEN gene.  A single mutation in one of the copies of PTEN causes Cowden syndrome and increases cancer risk.  The chart below shows the chance that someone with a PTEN mutation would develop cancer in their lifetime6,7.  Other benign features seen in some individuals with Cowden syndrome include benign skin lesions (facial papules, keratoses, lipomas), learning disability, autism, thyroid nodules, colon polyps, and larger head size.      *One recent study found breast cancer risk to be increased to 85%    Li-Fraumeni Syndrome   (TP53)  Li-Fraumeni Syndrome (LFS) is a cancer predisposition syndrome caused by a mutation in the TP53 gene. A single mutation in one of the copies of TP53 increases the risk for multiple cancers. Individuals with LFS are at an increased risk for developing cancer at a young age. The lifetime risk for development of a LFS-associated cancer is 50% by age 30 and 90% by age 60.   Core Cancers: Sarcomas, Breast, Brain, Lung, Leukemias/Lymphomas, Adrenocortical carcinomas  Other Cancers: Gastrointestinal, Thyroid, Skin, Genitourinary    Hereditary Diffuse Gastric Cancer   (CDH1)  Currently, one gene is known to cause hereditary diffuse gastric cancer (HDGC): CDH1.  Individuals with HDGC are at increased risk for diffuse gastric cancer and lobular breast cancer. Of people diagnosed with HDGC, 30-50% have a mutation in the CDH1  gene.  This suggests there are likely other genes that may cause HDGC that have not been identified yet.      Lifetime Cancer Risks    General Population HDGC    Diffuse Gastric  <1% ~80%   Breast 12% 39-52%         Additional Genes  VICTOR M  VICTOR M is a moderate-risk breast cancer gene. Women who have a mutation in VICTOR M can have between a 2-4 fold increased risk for breast cancer compared to the general population8. VICTOR M mutations have also been associated with increased risk for pancreatic cancer, however an estimate of this cancer risk is not well understood9. Individuals who inherit two VICTOR M mutations have a condition called ataxia-telangiectasia (AT).  This rare autosomal recessive condition affects the nervous system and immune system, and is associated with progressive cerebellar ataxia beginning in childhood.  Individuals with ataxia-telangiectasia often have a weakened immune system and have an increased risk for childhood cancers.    PALB2  Mutations in PALB2 have been shown to increase the risk of breast cancer up to 33-58% in some families; where individuals fall within this risk range is dependent upon family [id number]. PALB2 mutations have also been associated with increased risk for pancreatic cancer, although this risk has not been quantified yet.  Individuals who inherit two PALB2 mutations--one from their mother and one from their father--have a condition called Fanconi Anemia.  This rare autosomal recessive condition is associated with short stature, developmental delay, bone marrow failure, and increased risk for childhood cancers.    CHEK2   CHEK2 is a moderate-risk breast cancer gene.  Women who have a mutation in CHEK2 have around a 2-fold increased risk for breast cancer compared to the general population, and this risk may be higher depending upon family history.11,12,13 Mutations in CHEK2 have also been shown to increase the risk of a number of other cancers, including colon and prostate, however  these cancer risks are currently not well understood.    BRIP1, RAD51C and RAD51D  Mutations in BRIP1, RAD51C, and RAD51D have been shown to increase the risk of ovarian cancer and possibly female breast cancer as well14,15 .       Lifetime Cancer Risk    General Population BRIP1 RAD51C RAD51D   Ovarian 1-2% ~5-8% ~5-9% ~7-15%           Inheritance  All of the cancer syndromes reviewed above are inherited in an autosomal dominant pattern.  This means that if a parent has a mutation, each of his or her children will have a 50% chance of inheriting that same mutation.  Therefore, each child--male or female--would have a 50% chance of being at increased risk for developing cancer.      Image obtained from Genetics Home Reference, 2013     Mutations in some genes can occur de phil, which means that a person s mutation occurred for the first time in them and was not inherited from a parent.  Now that they have the mutation, however, it can be passed on to future generations.    Genetic Testing  Genetic testing involves a blood test and will look at the genetic information in the VICTOR M, BRCA1, BRCA2, BRIP1, CDH1, CHEK2, MLH1, MSH2, MSH6, PMS2, EPCAM, PTEN, PALB2, RAD51C, RAD51D, and TP53 genes for any harmful mutations that are associated with increased cancer risk.  If possible, it is recommended that the person(s) who has had cancer be tested before other family members.  That person will give us the most useful information about whether or not a specific gene is associated with the cancer in the family.    Results  There are three possible results of genetic testing:    Positive--a harmful mutation was identified in one or more of the genes    Negative--no mutation was identified in any of the genes on this panel    Variant of unknown significance--a variation in one of the genes was identified, but it is unclear how this impacts cancer risk in the family    Advantages and Disadvantages   There are advantages and  disadvantages to genetic testing.    Advantages    May clarify your cancer risk    Can help you make medical decisions    May explain the cancers in your family    May give useful information to your family members (if you share your results)    Disadvantages    Possible negative emotional impact of learning about inherited cancer risk    Uncertainty in interpreting a negative test result in some situations    Possible genetic discrimination concerns (see below)    Genetic Information Nondiscrimination Act (NATHEN)  NATHEN is a federal law that protects individuals from health insurance or employment discrimination based on a genetic test result alone.  Although rare, there are currently no legal discrimination protections in terms of life insurance, long term care, or disability insurances.  Visit the [employer] website to learn more.    Reducing Cancer Risk  All of the genes described above have nationally recognized cancer screening guidelines that would be recommended for individuals who test positive.  In addition to increased cancer screening, surgeries may be offered or recommended to reduce cancer risk.  Recommendations are based upon an individual s genetic test result as well as their personal and family history of cancer.    Questions to Think About Regarding Genetic Testing:    What effect will the test result have on me and my relationship with my family members if I have an inherited gene mutation?  If I don t have a gene mutation?    Should I share my test results, and how will my family react to this news, which may also affect them?    Are my children ready to learn new information that may one day affect their own health?    Hereditary Cancer Resources    FORCE: Facing Our Risk of Cancer Empowered facingourrisk.org   Bright Pink bebrightpink.org   Li-Fraumeni Syndrome Association lfsassociation.org   PTEN World PTENworld.com   No stomach for cancer, Inc.  nostomachforcancer.org   Stomach cancer relief network Scrnet.org   Collaborative Group of the Americas on Inherited Colorectal Cancer (CGA) cgaicc.com    Cancer Care cancercare.org   American Cancer Society (ACS) cancer.org   National Cancer [place] (NCI) cancer.gov     Please call us if you have any questions or concerns.   Cancer Risk Management Program [phone]-CANCER ([phone])  ? Kiki Peterson, MS, [facility]  [phone]  ? Zion Nobles, MS  [phone]  ? Nga Rich, MS, [facility]  [phone]  ? Yu Hsieh, MS, [facility]  [phone]  ? Loida Lara, MS, [facility]  [phone]  ? Malena Nolan, MS, [facility] [phone]  ? Johana Pavon, MS, [facility] [phone]    References  1. Alexa A, Sheron PDP, Christine S, Drew CASTILLO, Malika JE, Zohra JL, Braulio N, Inocencia H, Chloe O, Remy A, Isaiasini B, Radibib P, Manoukijennifer S, Felipe DM, Otilio N, Melissa E, Steve H, Piotr E, Veto J, Gronbandar J, Tito B, Derek H, Thorlacius S, Eerola H, Nevjenniferlinna H, Preet K, Maurice OP. Average risks of breast and ovarian cancer associated with BRCA1 or BRCA2 mutations detected in case series unselected for family history: a combined analysis of 222 studies. Am J Hum Sasha. 2003;72:1117-30.  2. Santana N, Bing M, Joe G.  BRCA1 and BRCA2 Hereditary Breast and Ovarian Cancer. Gene Reviews online. 2013.  3. Jermaine YC, Kellen S, Chrissie G, Guevara S. Breast cancer risk among male BRCA1 and BRCA2 mutation carriers. J Natl Cancer Inst. 2007;99:1811-4.  4. Sekou DG, Claire I, Juan J, Bridgett E, Lyubov ER, Irene F. Risk of breast cancer in male BRCA2 carriers. J Med Sasha. 2010;47:710-1.  5. National Comprehensive Cancer Network. Clinical practice guidelines in oncology, colorectal cancer screening. Available online (registration required). 2015.  6. Dez CARRILLO, Drake J, David J, Niya LA, Donita MS, Brooke C. Lifetime cancer risks in individuals with germline PTEN mutations. Clin Cancer Res. 2012;18:400-7.  7. Pilarski R. Cowden  Syndrome: A Critical Review of the Clinical Literature. J Sasha . 2009:18:13-27.  8. Thao A, Orestes D, Dharmesh S, Trinh P, Ivet T, Terri M, Jonas B, Arya H, Ruth R, Sari K, Glory L, Sekou DG, Felipe D, Bryce DF, Angelic MR, The Breast Cancer Susceptibility Collaboration (UK) & Rebeca WHITE. VICTOR M mutations that cause ataxia-telangiectasia are breast cancer susceptibility alleles. Nature Genetics. 2006;38:873-875  9. Balwinder N , Christiano Y, Adri J, Huseyin L, Karlos GM , Rea ML, Gallinger S, Pimentel AG, Syngal S, Shayna ML, Yohan J , Dima R, Sulma SZ, Rebeca JR, Aydee VE, Dalila M, Vopablo B, Laura N, Nanda RH, Andreas KW, and Aram AP. VICTOR M mutations in patients with hereditary pancreatic cancer. Cancer Discover. 2012;2:41-46  10. Alexa ASHFORD, et al. Breast-Cancer Risk in Families with Mutations in PALB2. NEJM. 2014; 371(6):497-506.  11. CHEK2 Breast Cancer Case-Control Consortium. CHEK2*1100delC and susceptibility to breast cancer: A collaborative analysis involving 10,860 breast cancer cases and 9,065 controls from 10 studies. Am J Hum Sasha, 74 (2004), pp. 7639-9634  12. Fidencio T, Leann S, Shane K, et al. Spectrum of Mutations in BRCA1, BRCA2, CHEK2, and TP53 in Families at High Risk of Breast Cancer. VIVIAN. 2006;295(12):5362-8548.   13. Yasmin C, Nichol D, Bird A, et al. Risk of breast cancer in women with a CHEK2 mutation with and without a family history of breast cancer. J Clin Oncol. 2011;29:5788-1363.  14. Priyank H, Jody E, Jean SJ, et al. Contribution of germline mutations in the RAD51B, RAD51C, and RAD51D genes to ovarian cancer in the population. J Clin Oncol. 2015;33(26):9700-3943. Doi:10.1200/JCO.2015.61.2408.  15. Raffy T, Eladio PANIAGUA, En P, et al. Mutations in BRIP1 confer high risk of ovarian cancer. Bailey Sasha. 2011;43(11):2819-3229. doi:10.1038/ng.955.

## 2019-08-19 ENCOUNTER — TELEPHONE (OUTPATIENT)
Dept: ONCOLOGY | Facility: CLINIC | Age: 54
End: 2019-08-19

## 2019-08-19 NOTE — TELEPHONE ENCOUNTER
I contacted Nkechi Manrique today about a change order about her status regarding a genetic test ordered 7/22/2019 via [facility].     She was previously seen on 7/22/2019 for a personal history of melanoma and a family history of pancreatic, breast, colon, brain, and  thymoma cancer.      I left a non-detailed voicemail message with my contact information.    Maria De Jesus Lara MS  Genetic counselor  [phone]

## 2019-08-20 ENCOUNTER — TELEPHONE (OUTPATIENT)
Dept: ONCOLOGY | Facility: CLINIC | Age: 54
End: 2019-08-20

## 2019-08-20 ENCOUNTER — OFFICE VISIT (OUTPATIENT)
Dept: PSYCHOLOGY | Facility: CLINIC | Age: 54
End: 2019-08-20
Payer: COMMERCIAL

## 2019-08-20 DIAGNOSIS — F41.9 ANXIETY DISORDER, UNSPECIFIED TYPE: Primary | ICD-10-CM
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
## 2019-08-20 NOTE — TELEPHONE ENCOUNTER
I contacted Nkechi Manrique a second time about a change order about her status regarding a genetic test ordered 7/22/2019 via [facility].     She was previously seen on 7/22/2019 for a personal history of melanoma and a family history of pancreatic, breast, colon, brain, and  thymoma cancer.       I left a non-detailed voicemail message with my contact information.     Maria De Jesus Lara MS  Genetic counselor  [phone]

## 2019-08-21 ENCOUNTER — TELEPHONE (OUTPATIENT)
Dept: ONCOLOGY | Facility: CLINIC | Age: 54
End: 2019-08-21

## 2019-08-21 NOTE — TELEPHONE ENCOUNTER
I contacted Nkechi Manrique a third time about a change order about her status regarding a genetic test ordered 7/22/2019 via [facility].      She was previously seen on 7/22/2019 for a personal history of melanoma and a family history of pancreatic, breast, colon, brain, and  thymoma cancer.       I left a non-detailed voicemail message with my contact information.     Maria De Jesus Lara MS  Genetic counselor  [phone]

## 2019-08-21 NOTE — TELEPHONE ENCOUNTER
I spoke with Nkechi Manrique today about a change order about her status regarding a genetic test ordered 7/22/2019 via [facility].      She was previously seen on 7/22/2019 for a personal history of melanoma and a family history of pancreatic, breast, colon, brain, and  thymoma cancer.       Nkechi confirmed that she would like to cancel the genetic test CancerNext-Expanded previously ordered on 7/22/2019 through [facility]. She expressed that she is already doing cancer screening and did not think genetic testing would benefit her.     We reviewed that she should continue annual mammograms, colonoscopies as needed, annual skin checks, and other  screening as recommended by her health providers. It was discussed that pancreatic screening is limited at this time but that if anything changes, we would contact her in the future.    She was encouraged to contact us if her personal or family history changes, or if she would like to initiate genetic testing in the future.     Maria De Jesus Lara MS  Genetic counselor  [phone]

## 2019-09-09 ENCOUNTER — OFFICE VISIT (OUTPATIENT)
Dept: PEDIATRICS | Facility: CLINIC | Age: 54
End: 2019-09-09
Payer: COMMERCIAL

## 2019-09-09 VITALS
RESPIRATION RATE: 16 BRPM | HEIGHT: 68 IN | DIASTOLIC BLOOD PRESSURE: 66 MMHG | SYSTOLIC BLOOD PRESSURE: 112 MMHG | TEMPERATURE: 98.1 F | BODY MASS INDEX: 31.07 KG/M2 | OXYGEN SATURATION: 99 % | WEIGHT: 205 LBS | HEART RATE: 68 BPM

## 2019-09-09 DIAGNOSIS — N30.91 CYSTITIS WITH HEMATURIA: Primary | ICD-10-CM

## 2019-09-09 PROBLEM — D03.72 MELANOMA IN SITU OF LEFT LOWER EXTREMITY INCLUDING HIP (H): Status: ACTIVE | Noted: 2019-09-09

## 2019-09-09 LAB
ALBUMIN UR-MCNC: 100 MG/DL
APPEARANCE UR: ABNORMAL
BACTERIA #/AREA URNS HPF: ABNORMAL /HPF
BILIRUB UR QL STRIP: NEGATIVE
COLOR UR AUTO: YELLOW
GLUCOSE UR STRIP-MCNC: NEGATIVE MG/DL
HGB UR QL STRIP: ABNORMAL
KETONES UR STRIP-MCNC: NEGATIVE MG/DL
LEUKOCYTE ESTERASE UR QL STRIP: ABNORMAL
NITRATE UR QL: NEGATIVE
NON-SQ EPI CELLS #/AREA URNS LPF: ABNORMAL /LPF
PH UR STRIP: 5.5 PH (ref 5–7)
RBC #/AREA URNS AUTO: ABNORMAL /HPF
SOURCE: ABNORMAL
SP GR UR STRIP: <=1.005 (ref 1–1.03)
UROBILINOGEN UR STRIP-ACNC: 0.2 EU/DL (ref 0.2–1)
WBC #/AREA URNS AUTO: >100 /HPF

## 2019-09-09 PROCEDURE — 87088 URINE BACTERIA CULTURE: CPT | Performed by: PHYSICIAN ASSISTANT

## 2019-09-09 PROCEDURE — 99213 OFFICE O/P EST LOW 20 MIN: CPT | Performed by: PHYSICIAN ASSISTANT

## 2019-09-09 PROCEDURE — 87186 SC STD MICRODIL/AGAR DIL: CPT | Performed by: PHYSICIAN ASSISTANT

## 2019-09-09 PROCEDURE — 87086 URINE CULTURE/COLONY COUNT: CPT | Performed by: PHYSICIAN ASSISTANT

## 2019-09-09 PROCEDURE — 81001 URINALYSIS AUTO W/SCOPE: CPT | Performed by: PHYSICIAN ASSISTANT

## 2019-09-09 RX ORDER — PHENAZOPYRIDINE HYDROCHLORIDE 200 MG/1
200 TABLET, FILM COATED ORAL 3 TIMES DAILY PRN
Qty: 9 TABLET | Refills: 0 | Status: SHIPPED | OUTPATIENT
Start: 2019-09-09 | End: 2020-08-03

## 2019-09-09 RX ORDER — CEFDINIR 300 MG/1
300 CAPSULE ORAL 2 TIMES DAILY
Qty: 10 CAPSULE | Refills: 0 | Status: SHIPPED | OUTPATIENT
Start: 2019-09-09 | End: 2020-03-04

## 2019-09-09 ASSESSMENT — MIFFLIN-ST. JEOR: SCORE: 1578.37

## 2019-09-09 NOTE — PROGRESS NOTES
"Subjective     Nkechi Manrique is a 53 year old female who presents to clinic today for the following health issues:    HPI   URINARY TRACT SYMPTOMS  Onset: STARTED LAST NIGHT    Description:   Painful urination (Dysuria): YES           Frequency: YES  Blood in urine (Hematuria): YES- very mild, only noticed mild blood when wiping after urination  Delay in urine (Hesitency): no     Intensity: moderate    Progression of Symptoms:  worsening    Accompanying Signs & Symptoms:  Fever/chills: no   Flank pain no   Nausea and vomiting: no   Any vaginal symptoms: none  Abdominal/Pelvic Pain: YES- mild in lower suprapubic area    History:   History of frequent UTI's: no   History of kidney stones: no   Sexually Active: YES  Possibility of pregnancy: No    Precipitating factors:   none  Therapies Tried and outcome: OTC advil or tylenol didn't help much    Review of Systems   ROS COMP: Constitutional, HEENT, cardiovascular, pulmonary, gi and gu systems are negative, except as otherwise noted.      Objective    /66 (BP Location: Right arm, Patient Position: Chair, Cuff Size: Adult Large)   Pulse 68   Temp 98.1  F (36.7  C) (Oral)   Resp 16   Ht 1.727 m (5' 8\")   Wt 93 kg (205 lb)   LMP 10/16/2015 (Exact Date)   SpO2 99%   Breastfeeding? No   BMI 31.17 kg/m    Body mass index is 31.17 kg/m .  Physical Exam   GENERAL: alert, no distres  RESP: lungs clear to auscultation - no rales, rhonchi or wheezes  CV: regular rate and rhythm, normal S1 S2, no S3 or S4, no murmur  ABDOMEN: soft, nontender  BACK: no CVAT    Diagnostic Test Results:  Results for orders placed or performed in visit on 09/09/19 (from the past 24 hour(s))   UA reflex to Microscopic and Culture   Result Value Ref Range    Color Urine Yellow     Appearance Urine Cloudy     Glucose Urine Negative NEG^Negative mg/dL    Bilirubin Urine Negative NEG^Negative    Ketones Urine Negative NEG^Negative mg/dL    Specific Gravity Urine <=1.005 1.003 - 1.035    " Blood Urine Large (A) NEG^Negative    pH Urine 5.5 5.0 - 7.0 pH    Protein Albumin Urine 100 (A) NEG^Negative mg/dL    Urobilinogen Urine 0.2 0.2 - 1.0 EU/dL    Nitrite Urine Negative NEG^Negative    Leukocyte Esterase Urine Large (A) NEG^Negative    Source Midstream Urine    Urine Microscopic   Result Value Ref Range    WBC Urine >100 (A) OTO5^0 - 5 /HPF    RBC Urine O - 2 OTO2^O - 2 /HPF    Squamous Epithelial /LPF Urine Few FEW^Few /LPF    Bacteria Urine Moderate (A) NEG^Negative /HPF           Assessment & Plan   (N30.91) Cystitis with hematuria  (primary encounter diagnosis)  Comment: begin antibiotics. UC pending. Push fluids.  Plan: UA reflex to Microscopic and Culture, Urine         Microscopic, Urine Culture Aerobic Bacterial,         cefdinir (OMNICEF) 300 MG capsule,         phenazopyridine (PYRIDIUM) 200 MG tablet          Dave Neal PA-C  [facility] [place]

## 2019-09-09 NOTE — PROGRESS NOTES
"Subjective     Nkechi Manrique is a 53 year old female who presents to clinic today for the following health issues:    HPI   URINARY TRACT SYMPTOMS  Onset: ***    Description:   Painful urination (Dysuria): { :165052}           Frequency: { :860479}  Blood in urine (Hematuria): { :501231}  Delay in urine (Hesitency): { :228628}    Intensity: {.:017350}    Progression of Symptoms:  {.:286142}    Accompanying Signs & Symptoms:  Fever/chills: { :599746}  Flank pain { :101876}  Nausea and vomiting: { :300428}  Any vaginal symptoms: {.:160728::\"none\"}  Abdominal/Pelvic Pain: { :590840}    History:   History of frequent UTI's: { :372351}  History of kidney stones: { :903081}  Sexually Active: { :763236}  Possibility of pregnancy: { :691890::\"No\"}    Precipitating factors:   ***    Therapies Tried and outcome: {UTI sx treat:938040::\"***\"}  {additonal problems for provider to add (Optional):345164}    {HIST REVIEW/ LINKS 2 (Optional):586530}    Reviewed and updated as needed this visit by Provider         Review of Systems   {ROS COMP (Optional):226727}      Objective    LMP 10/16/2015 (Exact Date)   There is no height or weight on file to calculate BMI.  Physical Exam   {Exam List (Optional):888444}    {Diagnostic Test Results (Optional):863800::\"Diagnostic Test Results:\",\"Labs reviewed in Epic\"}        {PROVIDER CHARTING PREFERENCE:829522}    "

## 2019-09-10 LAB
BACTERIA SPEC CULT: ABNORMAL
SPECIMEN SOURCE: ABNORMAL

## 2019-09-17 ENCOUNTER — OFFICE VISIT (OUTPATIENT)
Dept: PSYCHOLOGY | Facility: CLINIC | Age: 54
End: 2019-09-17
Payer: COMMERCIAL

## 2019-09-17 DIAGNOSIS — F41.9 ANXIETY DISORDER, UNSPECIFIED TYPE: Primary | ICD-10-CM
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
## 2019-09-30 ENCOUNTER — HEALTH MAINTENANCE LETTER (OUTPATIENT)
Age: 54
End: 2019-09-30

## 2019-10-04 LAB
LOCATION PERFORMED: NORMAL
RESULT: NORMAL
SEND OUTS MISC TEST CODE: 8874
SEND OUTS MISC TEST SPECIMEN: NORMAL
TEST NAME: NORMAL

## 2019-10-29 ENCOUNTER — HEALTH MAINTENANCE LETTER (OUTPATIENT)
Age: 54
End: 2019-10-29

## 2019-11-21 ENCOUNTER — TRANSFERRED RECORDS (OUTPATIENT)
Dept: HEALTH INFORMATION MANAGEMENT | Facility: CLINIC | Age: 54
End: 2019-11-21

## 2019-12-17 ENCOUNTER — OFFICE VISIT (OUTPATIENT)
Dept: FAMILY MEDICINE | Facility: CLINIC | Age: 54
End: 2019-12-17
Payer: COMMERCIAL

## 2019-12-17 VITALS
DIASTOLIC BLOOD PRESSURE: 78 MMHG | TEMPERATURE: 97.8 F | OXYGEN SATURATION: 97 % | SYSTOLIC BLOOD PRESSURE: 128 MMHG | HEART RATE: 72 BPM

## 2019-12-17 DIAGNOSIS — Z71.84 ENCOUNTER FOR COUNSELING FOR TRAVEL: Primary | ICD-10-CM

## 2019-12-17 PROCEDURE — 99401 PREV MED CNSL INDIV APPRX 15: CPT | Performed by: PHYSICIAN ASSISTANT

## 2019-12-17 RX ORDER — AZITHROMYCIN 500 MG/1
TABLET, FILM COATED ORAL
Qty: 3 TABLET | Refills: 0 | Status: SHIPPED | OUTPATIENT
Start: 2019-12-17 | End: 2020-03-04

## 2019-12-17 NOTE — PATIENT INSTRUCTIONS
"See travel packet provided  Recommend ultrathon (mosquito repellant), pepto bismol and imodium  The food and drink choices you make while traveling can impact your likelihood of getting sick.   If you aren't sure if a food or drink is safe, the saying \" BOIL IT, COOK IT, PEEL IT, OR FORGET IT\" can help you decide whether it's okay to consume.   Also bring hand  and sun screen with you.  Safe Travels       Today December 17, 2019 you received the    Typhoid - Oral. This prescription has been faxed to your pharmacy, please take as directed. This is valid for 5 years.   .    These appointments can be made as a NURSE ONLY visit.    **It is very important for the vaccinations to be given on the scheduled day(s), this helps ensure you receive the full effectiveness of the vaccine.**    Please call [facility] with any questions [phone]    Thank you for visiting [facility]    "

## 2020-02-21 ENCOUNTER — TRANSFERRED RECORDS (OUTPATIENT)
Dept: HEALTH INFORMATION MANAGEMENT | Facility: CLINIC | Age: 55
End: 2020-02-21

## 2020-03-04 ENCOUNTER — OFFICE VISIT (OUTPATIENT)
Dept: PEDIATRICS | Facility: CLINIC | Age: 55
End: 2020-03-04
Payer: COMMERCIAL

## 2020-03-04 VITALS
WEIGHT: 180 LBS | BODY MASS INDEX: 27.28 KG/M2 | DIASTOLIC BLOOD PRESSURE: 74 MMHG | TEMPERATURE: 97.3 F | OXYGEN SATURATION: 100 % | SYSTOLIC BLOOD PRESSURE: 112 MMHG | HEIGHT: 68 IN | RESPIRATION RATE: 16 BRPM | HEART RATE: 69 BPM

## 2020-03-04 DIAGNOSIS — J30.2 SEASONAL ALLERGIC RHINITIS, UNSPECIFIED TRIGGER: ICD-10-CM

## 2020-03-04 DIAGNOSIS — J01.90 ACUTE SINUSITIS WITH SYMPTOMS > 10 DAYS: ICD-10-CM

## 2020-03-04 DIAGNOSIS — J34.89 SINUS PAIN: Primary | ICD-10-CM

## 2020-03-04 PROCEDURE — 99213 OFFICE O/P EST LOW 20 MIN: CPT | Performed by: NURSE PRACTITIONER

## 2020-03-04 ASSESSMENT — MIFFLIN-ST. JEOR: SCORE: 1459.97

## 2020-03-04 NOTE — PATIENT INSTRUCTIONS
10 days of antibiotic. Take with food. Probiotic or greek yogurt.  Consider re-starting flonase and claritin.  If not improving after antibiotics then see ENT for evaluation, you have to call and schedule this.

## 2020-03-04 NOTE — PROGRESS NOTES
"Subjective     Nkechi Manrique is a 54 year old female who presents to clinic today for the following health issues:    HPI   Acute Illness   Acute illness concerns: sinuses  Onset: on and off for for more than a couple of months     Fever: no     Chills/Sweats: no     Headache (location?): YES- occasion     Sinus Pressure:YES- tender     Conjunctivitis:  no    Ear Pain: no    Rhinorrhea: YES- sometimes     Congestion: no    Sore Throat: no     Cough: no    Wheeze: no    Decreased Appetite: no    Nausea: no    Vomiting: no    Diarrhea:  no    Dysuria/Freq.: no    Fatigue/Achiness: no    Sick/Strep Exposure: no     Therapies Tried and outcome: sudafed did not help     Hx of seasonal allergies and asthma.  Flonase and claritin, doesn't take during the winter months but no daily inhalers.  Left sided maxillary sinus tenderness and teeth sensitivity on and off over the past several months.      Reviewed and updated as needed this visit by Provider  Meds  Problems       Review of Systems   ROS COMP: Constitutional, HEENT, cardiovascular, pulmonary, gi and gu systems are negative, except as otherwise noted.      Objective    /74 (BP Location: Right arm, Patient Position: Chair, Cuff Size: Adult Regular)   Pulse 69   Temp 97.3  F (36.3  C) (Tympanic)   Resp 16   Ht 1.727 m (5' 8\")   Wt 81.6 kg (180 lb)   LMP 10/16/2015 (Exact Date)   SpO2 100%   BMI 27.37 kg/m    Body mass index is 27.37 kg/m .  Physical Exam   GENERAL: healthy, alert and no distress  EYES: Eyes grossly normal to inspection  HENT: both ears: red and boggy canal, nose and mouth without ulcers or lesions, green mucus in nose, oropharynx clear, oral mucous membranes moist and sinuses: maxillary, ethmoid tenderness on left  NECK: no adenopathy  RESP: lungs clear to auscultation - no rales, rhonchi or wheezes  CV: regular rate and rhythm, normal S1 S2, no S3 or S4, no murmur, click or rub    Diagnostic Test Results:  none         Assessment & " Plan       ICD-10-CM    1. Sinus pain J34.89 amoxicillin-clavulanate (AUGMENTIN) 875-125 MG tablet     OTOLARYNGOLOGY REFERRAL   2. Acute sinusitis with symptoms > 10 days J01.90 amoxicillin-clavulanate (AUGMENTIN) 875-125 MG tablet     OTOLARYNGOLOGY REFERRAL   3. Seasonal allergic rhinitis, unspecified trigger J30.2 OTOLARYNGOLOGY REFERRAL   See plan below in pt instructions.  Patient Instructions   10 days of antibiotic. Take with food. Probiotic or greek yogurt.  Consider re-starting flonase and claritin.  If not improving after antibiotics then see ENT for evaluation, you have to call and schedule this.      No follow-ups on file.    Debi Donovan NP  [facility]

## 2020-03-17 ENCOUNTER — ANCILLARY PROCEDURE (OUTPATIENT)
Dept: MAMMOGRAPHY | Facility: CLINIC | Age: 55
End: 2020-03-17
Attending: OBSTETRICS & GYNECOLOGY
Payer: COMMERCIAL

## 2020-03-17 DIAGNOSIS — Z12.31 VISIT FOR SCREENING MAMMOGRAM: ICD-10-CM

## 2020-05-19 ENCOUNTER — TELEPHONE (OUTPATIENT)
Dept: ORTHOPEDICS | Facility: CLINIC | Age: 55
End: 2020-05-19

## 2020-05-19 ENCOUNTER — TRANSFERRED RECORDS (OUTPATIENT)
Dept: HEALTH INFORMATION MANAGEMENT | Facility: CLINIC | Age: 55
End: 2020-05-19

## 2020-05-19 NOTE — TELEPHONE ENCOUNTER
Nkechi called in wondering if she can be seen for her L shoulder pain. She thinks it may have happened when she lifted her bicycle overhead around 4/2020.  She was more keen on an in-person appt because she believes a physical evaluation would provide a better diagnoses.    I helped her schedule a time to see Dr. Sanders at 8:20AM on 5/21.     - Jose CHOI ATC

## 2020-05-19 NOTE — TELEPHONE ENCOUNTER
Called patient and left voicemail. Requested that patient call back with more information regarding her shoulder pain,     Patient was previously seen by Dr. Cabral for a different body part in 2010.     If patient has not been seen for her shoulder she may benefit from by walk in/sports for virtual visit vs in person visit but this would need to be okayed by walk in staff.

## 2020-05-19 NOTE — TELEPHONE ENCOUNTER
[facility] Call Center    Phone Message    May a detailed message be left on voicemail: yes     Reason for Call: Other: pt calling because she is having pain in her shoulder and would like to be seen in clinic as soon as possible. Please call the pt back to discuss as soon as possible. Thank You.      Action Taken: Message routed to:  [facility]opedics    Travel Screening: Not Applicable

## 2020-05-21 ENCOUNTER — OFFICE VISIT (OUTPATIENT)
Dept: ORTHOPEDICS | Facility: CLINIC | Age: 55
End: 2020-05-21
Payer: COMMERCIAL

## 2020-05-21 ENCOUNTER — ANCILLARY PROCEDURE (OUTPATIENT)
Dept: GENERAL RADIOLOGY | Facility: CLINIC | Age: 55
End: 2020-05-21
Attending: STUDENT IN AN ORGANIZED HEALTH CARE EDUCATION/TRAINING PROGRAM
Payer: COMMERCIAL

## 2020-05-21 VITALS — SYSTOLIC BLOOD PRESSURE: 133 MMHG | DIASTOLIC BLOOD PRESSURE: 76 MMHG | RESPIRATION RATE: 16 BRPM

## 2020-05-21 DIAGNOSIS — M25.512 PAIN IN JOINT OF LEFT SHOULDER: Primary | ICD-10-CM

## 2020-05-21 DIAGNOSIS — M25.512 PAIN IN JOINT OF LEFT SHOULDER: ICD-10-CM

## 2020-05-21 RX ORDER — DICLOFENAC SODIUM 75 MG/1
75 TABLET, DELAYED RELEASE ORAL 2 TIMES DAILY
Qty: 28 TABLET | Refills: 1 | Status: SHIPPED | OUTPATIENT
Start: 2020-05-21 | End: 2020-08-03

## 2020-05-21 NOTE — PROGRESS NOTES
SPORTS & ORTHOPEDIC WALK-IN VISIT 5/21/2020    Primary Care Physician: None    Reason for visit:   Here today for left shoulder pain.  Initial injury occurred roughly 6 weeks ago when she was lifting her bike.  Felt a sharp pain in her anterior shoulder.  Has a throbbing aching pain at rest.  Sharp pain with activity particularly reaching out and overhead.  Has tried numerous home remedies including acetaminophen, NSAIDs, ice and heat as well as foam roller.  Does have less pain at rest.  No prior history of shoulder pain or injury.      What part of your body is injured / painful?  left shoulder    What caused the injury /pain? Lifting bike out of garage    How long ago did your injury occur or pain begin? 6 weeks ago    What are your most bothersome symptoms? Pain    How would you characterize your symptom?  aching    What makes your symptoms better? Rest, tylenol, ibuprofen, ice, heat, foam roller    What makes your symptoms worse? Other: horizontal abduction, outstretched    Have you been previously seen for this problem? No    Medical History:    Any recent changes to your medical history? No    Any new medication prescribed since last visit? No    Have you had surgery on this body part before? No    Social History:    Occupation: Nurse    Handedness: Right    Exercise: Most days/week    Review of Systems:    Do you have fever, chills, weight loss? No    Do you have any vision problems? Yes, left eye cataracts     Do you have any chest pain or edema? No    Do you have any shortness of breath or wheezing?  No    Do you have stomach problems? No    Do you have any numbness or focal weakness? No    Do you have diabetes? No    Do you have problems with bleeding or clotting? Factor 5 Leiden    Do you have an rashes or other skin lesions? No           Past Medical History, Current Medications, and Allergies are reviewed in the electronic medical record as appropriate.       EXAM:/76   Resp 16   LMP  10/16/2015 (Exact Date)     EXAM:  Alert, pleasant and conversational    Neck with full AROM, non-tender to midline cervical and upper thoracic spine palpation, negative Spurling's.  Elbow with FROM and non-tender to palpation      left shoulder:   Skin intact. No skin changes, deformity or atrophy    AROM: Forward Flexion 180 , Abduction 180 , External rotation approximately 70  Internal rotation to T7. positive  tenderness with [forward flexion and internal rotation].   symmetric ROM compared to uninjured side  symmetric Scapular motion    Strength testing: Abduction: 5/5, Abduction with 30  horizontal flexion and internal rotation: 5/5, External rotation: 5/5, Internal rotation 5/5.   Deltoids 5/5, Biceps 5/5, Triceps 5/5,  5/5.    Palpation: negative TTP of the Acromioclavicular joint  negative TTP of Sternoclavicular joint  negative TTP of posterior glenoid  negative TTP of scapular borders  positive  TTP of the bicipital tendon.     Special Tests: negative Lazar test  positive Neer's test.   positive Sundown's test   negative Speed's test.    Neurovascularly intact bilateral upper extremities.      Imagin view xrays of left shoulder performed and reviewed independently demonstrating no acute fracture or dislocation.  No significant degenerative change. See EMR for formal radiology report.     Assessment: Patient is a 54 year old female with 6 weeks of left anterior shoulder pain that is suspicious for subacromial bursitis and rotator cuff tendinopathy.    Recommendations:   Reviewed imaging and assessment with the patient detail  Discussed options for treatment at this time which include home exercise program and physical therapy  We will try diclofenac twice daily with food for up to 2 weeks  She will ice at least once daily  We discussed a subacromial steroid injection.  The patient would like to defer for now but may reconsider in the future for persistent or worsening symptoms.  She will  follow-up in 6 weeks if she is not improving.    Montana Sanders MD

## 2020-05-28 ENCOUNTER — TRANSFERRED RECORDS (OUTPATIENT)
Dept: HEALTH INFORMATION MANAGEMENT | Facility: CLINIC | Age: 55
End: 2020-05-28

## 2020-05-29 ENCOUNTER — RESULTS ONLY (OUTPATIENT)
Dept: LAB | Age: 55
End: 2020-05-29

## 2020-05-29 ENCOUNTER — APPOINTMENT (OUTPATIENT)
Dept: LAB | Facility: CLINIC | Age: 55
End: 2020-05-29
Payer: COMMERCIAL

## 2020-06-01 ENCOUNTER — THERAPY VISIT (OUTPATIENT)
Dept: PHYSICAL THERAPY | Facility: CLINIC | Age: 55
End: 2020-06-01
Attending: FAMILY MEDICINE
Payer: COMMERCIAL

## 2020-06-01 DIAGNOSIS — M25.512 PAIN IN JOINT OF LEFT SHOULDER: ICD-10-CM

## 2020-06-01 DIAGNOSIS — S49.92XA SHOULDER INJURY, LEFT, INITIAL ENCOUNTER: Primary | ICD-10-CM

## 2020-06-01 PROCEDURE — 97161 PT EVAL LOW COMPLEX 20 MIN: CPT | Mod: GP | Performed by: PHYSICAL THERAPIST

## 2020-06-01 PROCEDURE — 97110 THERAPEUTIC EXERCISES: CPT | Mod: GP | Performed by: PHYSICAL THERAPIST

## 2020-06-01 NOTE — PROGRESS NOTES
[facility] Initial Evaluation  Subjective:  [facility]   Physical Therapy Initial Examination/Evaluation  June 1, 2020    Nkechi Manrique is a 54 year old female referred to physical therapy for treatment of left shoulder pain with Precautions/Restrictions/MD instructions none  Twinge in left shoulder getting a bike off the rack.  Pt reports overall decreased in sxs since onset with time and meds.  Pain with raising arm out to side.  Pt is right hand dominant.      Subjective:  DOI/onset: 4/15/20 DOS: none  Acute Injury or Gradual Onset?: gradual  Mechanism of Injury: unknown  Related PMH: no hx of shoulder and neck injuries Previous Treatment: Nothing Effect of prior treatment: good  Imaging: x-ray  Chief Complaint/Functional Limitations:   Pain with reaching overhead or up the back and see below in therapy evaluation codes   Pain: rest 0 /10, activity 5/10 Location: superior and anterior shoulder Frequency: Intermittent Described as: aching and sharp Alleviated by: Nothing Progression of Symptoms: Gradually getting better. Time of day when pain is worse: Activity related  Sleeping: No issues/uninterrupted   Occupation: rn  Job duties: prolonged sitting, prolonged standing, prolonged walking  Current HEP/exercise regimen: running  Patient's goals are see chief complaints get rid of pain    Other pertinent PMH/Red Flags: None   Barriers at home/work: None as reported by patient  Pertinent Surgical History: none  Medications: Pain  General health as reported by patient: excellent  Return to MD:  As needed                        Objective:  System  TTP: left BLHT  Obs: pleasant female full symmetric, ant shoulder position     ROM pain with left abduction 110 or greater and endrange IR and ext  MMT: left shoulder full strength pain with resisted scaption  Negative obriens  Negative neer  decr pec flexibility               Physical Exam    General     ROS    Assessment/Plan:    Patient is a 54 year old  female with left side shoulder complaints.    Patient has the following significant findings with corresponding treatment plan.                Diagnosis 1:  Left RTC  Pain -  hot/cold therapy, manual therapy, self management, education and home program  Decreased ROM/flexibility - manual therapy and therapeutic exercise  Decreased strength - therapeutic exercise and therapeutic activities  Decreased function - therapeutic activities    Therapy Evaluation Codes:   1) History comprised of:   Personal factors that impact the plan of care:      None.    Comorbidity factors that impact the plan of care are:      None.     Medications impacting care: None.  2) Examination of Body Systems comprised of:   Body structures and functions that impact the plan of care:      Shoulder.   Activity limitations that impact the plan of care are:      Dressing and Lifting.  3) Clinical presentation characteristics are:   Stable/Uncomplicated.  4) Decision-Making    Low complexity using standardized patient assessment instrument and/or measureable assessment of functional outcome.  Cumulative Therapy Evaluation is: Low complexity.    Previous and current functional limitations:  (See Goal Flow Sheet for this information)    Short term and Long term goals: (See Goal Flow Sheet for this information)     Communication ability:  Patient appears to be able to clearly communicate and understand verbal and written communication and follow directions correctly.  Treatment Explanation - The following has been discussed with the patient:   RX ordered/plan of care  Anticipated outcomes  Possible risks and side effects  This patient would benefit from PT intervention to resume normal activities.   Rehab potential is good.    Frequency:  1 X every other week, once daily  Duration:  for 8 weeks  Discharge Plan:  Achieve all LTG.  Independent in home treatment program.  Reach maximal therapeutic benefit.    Please refer to the daily flowsheet for  treatment today, total treatment time and time spent performing 1:1 timed codes.

## 2020-06-02 LAB
COVID-19 SPIKE RBD ABY TITER: NORMAL
COVID-19 SPIKE RBD ABY: NEGATIVE

## 2020-08-03 ENCOUNTER — OFFICE VISIT (OUTPATIENT)
Dept: OPHTHALMOLOGY | Facility: CLINIC | Age: 55
End: 2020-08-03
Attending: OPHTHALMOLOGY
Payer: COMMERCIAL

## 2020-08-03 DIAGNOSIS — H25.13 NUCLEAR SCLEROTIC CATARACT OF BOTH EYES: Primary | ICD-10-CM

## 2020-08-03 DIAGNOSIS — H52.13 MYOPIC ASTIGMATISM OF BOTH EYES: ICD-10-CM

## 2020-08-03 DIAGNOSIS — H52.203 MYOPIC ASTIGMATISM OF BOTH EYES: ICD-10-CM

## 2020-08-03 DIAGNOSIS — H52.4 PRESBYOPIA: ICD-10-CM

## 2020-08-03 PROCEDURE — 76519 ECHO EXAM OF EYE: CPT | Mod: ZF | Performed by: OPHTHALMOLOGY

## 2020-08-03 PROCEDURE — G0463 HOSPITAL OUTPT CLINIC VISIT: HCPCS | Mod: ZF

## 2020-08-03 RX ORDER — FEXOFENADINE HCL 180 MG/1
180 TABLET ORAL EVERY MORNING
COMMUNITY
End: 2022-10-11

## 2020-08-03 ASSESSMENT — CUP TO DISC RATIO
OS_RATIO: 0.4
OD_RATIO: 0.5

## 2020-08-03 ASSESSMENT — REFRACTION_WEARINGRX
OD_ADD: +2.50
OD_SPHERE: -2.25
OS_CYLINDER: +1.75
OD_AXIS: 005
OD_CYLINDER: +0.50
OS_SPHERE: -5.75
OS_ADD: +2.50
OS_AXIS: 105

## 2020-08-03 ASSESSMENT — EXTERNAL EXAM - RIGHT EYE: OD_EXAM: NORMAL

## 2020-08-03 ASSESSMENT — VISUAL ACUITY
OS_PH_CC+: -2
OS_CC: 20/60
METHOD: SNELLEN - LINEAR
OD_CC: 20/20
OS_PH_CC: 20/25

## 2020-08-03 ASSESSMENT — SLIT LAMP EXAM - LIDS
COMMENTS: NORMAL
COMMENTS: NORMAL

## 2020-08-03 ASSESSMENT — TONOMETRY
IOP_METHOD: TONOPEN
OD_IOP_MMHG: 15
OS_IOP_MMHG: 15

## 2020-08-03 ASSESSMENT — CONF VISUAL FIELD
OD_NORMAL: 1
METHOD: COUNTING FINGERS
OS_NORMAL: 1

## 2020-08-03 ASSESSMENT — EXTERNAL EXAM - LEFT EYE: OS_EXAM: NORMAL

## 2020-08-03 NOTE — PROGRESS NOTES
HPI:  Nkechi Manrique is a 54 year old female here for cataract evaluation. Her last eye exam was at [facility] in May, and at that time, she was told that her vision was not correctable with glasses. She has noticed a slow worsening blurring of her left eye vision over the last several months. Her current glasses no longer help the vision. She notes the blurring for near and distance vision which interferes with her ability to read. There is associated tripling of her vision with the left eye and worsening glare with bright lights at night. No pain, redness, discharge. No flashes/floaters.    POH: Refractive error with contact lens wear > 5 years ago, now with glasses correction. No history of eye surgery or trauma  PMH: No DM or HTN  FH: No glc or AMD    Assessment & Plan     (H25.13) Nuclear sclerotic cataract of both eyes  (primary encounter diagnosis)  Comment: Visually significant left eye with BCVA of 20/60 with large myopic shift to SE of -9.75 at last refraction at [facility]. Dense NS on exam.    Dilates to: 7 mm  Alpha blockers/Flomax: None  Trauma/Pseudoxfoliation: None  Fuchs dystrophy/guttae: None    Diabetes: No  Anticoagulation: None    Cyl: 1.09 @ 058 right eye, 1.26 @ 108 left eye     We discussed the risks and benefits of cataract surgery in the left eye, and informed consent was obtained.  Proceed with CE/IOL OS.    Surgical plan:  Topical  Aim emmetropia, considering toric IOL left eye (she will call to let me know)    (H52.203,  H52.13) Myopic astigmatism of both eyes  (H52.4) Presbyopia  Comment: Vision limited by cataract, myopic shift as above  Plan: Hold off on updating glasses Rx until after CE/IOL      -----------------------------------------------------------------------------------    Patient disposition:   Return for scheduled procedure, or sooner as needed.    Teaching statement:  Complete documentation of historical and exam elements from today's encounter can be  found in the full encounter summary report (not reduplicated in this progress note). I personally obtained the chief complaint(s) and history of present illness.  I confirmed and edited as necessary the review of systems, past medical/surgical history, family history, social history, and examination findings as documented by others; and I examined the patient myself. I personally reviewed the relevant tests, images, and reports as documented above.     I formulated and edited as necessary the assessment and plan and discussed the findings and management plan with the patient and family.      Dianna Knowles MD  Comprehensive Ophthalmology & Ocular Pathology  Department of Ophthalmology and Visual Neurosciences  [email or web address]  Pager [phone]

## 2020-08-03 NOTE — NURSING NOTE
Chief Complaints and History of Present Illnesses   Patient presents with     Cataract     Chief Complaint(s) and History of Present Illness(es)     Cataract     Laterality: left eye    Severity: moderate    Onset: gradual    Frequency: constant    Context: distance vision, mid-range vision and near vision              Comments     Nkechi is here as a new patient in need of a cataract surgery. She found out about the provider through a friend. He says her left eye is not correctable with glasses which started recently. She says RE seems okay. She denies flashes floaters or eye pain.    Hai Walker COT 7:39 AM August 3, 2020

## 2020-08-04 ENCOUNTER — TELEPHONE (OUTPATIENT)
Dept: OPHTHALMOLOGY | Facility: CLINIC | Age: 55
End: 2020-08-04

## 2020-08-04 DIAGNOSIS — Z11.59 ENCOUNTER FOR SCREENING FOR OTHER VIRAL DISEASES: Primary | ICD-10-CM

## 2020-08-04 PROBLEM — H25.13 NUCLEAR SCLEROTIC CATARACT OF BOTH EYES: Status: ACTIVE | Noted: 2020-08-04

## 2020-08-04 NOTE — TELEPHONE ENCOUNTER
Spoke with patient to schedule left eye surgery with Dr. Dianna Knowles.    Surgery was scheduled on 8/28/2020 at [facility]  Patient will have H&P at [facility] on 8/24/2020.     Patient is aware they will receive a call to schedule a COVID-19 test before their procedure.   The test should be with-in 4 days of your procedure.     Patients covid test has been scheduled on 8/24/2020.  Post-Op visit was scheduled on 8/28/2020 and 9/14/2020.  Patient is aware a / is needed day of surgery.   Surgery packet was mailed 8/4, patient has my direct contact information for any further questions [phone].

## 2020-08-04 NOTE — TELEPHONE ENCOUNTER
Called patient to schedule procedure with Dr. Knowles, there was no answer.  Left message with my direct line [phone].

## 2020-08-05 NOTE — TELEPHONE ENCOUNTER
FUTURE VISIT INFORMATION      SURGERY INFORMATION:    Date: 8/28/20    Location:  OR    Surgeon:  Dianna Knowles MD    Anesthesia Type:  Combined MAC with Topical     Procedure: LEFT CATARACT REMOVAL WITH INTRAOCULAR TORIC LENS IMPLANT     Consult: OV 8/3/20    RECORDS REQUESTED FROM:       [facility]

## 2020-08-24 ENCOUNTER — ANESTHESIA EVENT (OUTPATIENT)
Dept: SURGERY | Facility: AMBULATORY SURGERY CENTER | Age: 55
End: 2020-08-24

## 2020-08-24 ENCOUNTER — OFFICE VISIT (OUTPATIENT)
Dept: SURGERY | Facility: CLINIC | Age: 55
End: 2020-08-24
Payer: COMMERCIAL

## 2020-08-24 ENCOUNTER — PRE VISIT (OUTPATIENT)
Dept: SURGERY | Facility: CLINIC | Age: 55
End: 2020-08-24

## 2020-08-24 VITALS
WEIGHT: 174 LBS | SYSTOLIC BLOOD PRESSURE: 128 MMHG | HEIGHT: 68 IN | OXYGEN SATURATION: 98 % | BODY MASS INDEX: 26.37 KG/M2 | HEART RATE: 73 BPM | DIASTOLIC BLOOD PRESSURE: 83 MMHG | RESPIRATION RATE: 15 BRPM | TEMPERATURE: 98.2 F

## 2020-08-24 DIAGNOSIS — H25.13 NUCLEAR SCLEROTIC CATARACT OF BOTH EYES: ICD-10-CM

## 2020-08-24 DIAGNOSIS — Z01.818 PRE-OP EVALUATION: Primary | ICD-10-CM

## 2020-08-24 DIAGNOSIS — Z11.59 ENCOUNTER FOR SCREENING FOR OTHER VIRAL DISEASES: ICD-10-CM

## 2020-08-24 ASSESSMENT — PAIN SCALES - GENERAL: PAINLEVEL: NO PAIN (0)

## 2020-08-24 ASSESSMENT — MIFFLIN-ST. JEOR: SCORE: 1432.76

## 2020-08-24 NOTE — PATIENT INSTRUCTIONS
Preparing for Your Surgery      Name:  Nkechi Manrique   MRN:  [id number]   :  1965   Today's Date:  2020         Arriving for surgery:  Surgery date:  20  Arrival time:  8:40 am    Restrictions due to COVID 19:  No visitors at the Surgery Center   parking is not available     Please come to:    [facility]  [address] [place] [id number]    Please check in on the 5th floor at the Ambulatory Surgery Center         What can I eat or drink?    -  You may eat and drink normally until 8 hours before surgery. (Until 2:00 am)  -  You may have clear liquids up to 4 hours before surgery. (Until 6:00 am)    Examples of clear liquids:  Water  Clear broth  Juices (apple, white grape, white cranberry  and cider) without pulp  Noncarbonated, powder based beverages  (lemonade and Mark-Aid)  Sodas (Sprite, 7-Up, ginger ale and seltzer)  Coffee or tea (without milk or cream)  Gatorade    --No alcohol for at least 24 hours before surgery    Which medicines can I take?    Hold aspirin for 7 days before surgery.   Hold multivitamins for 7 days before surgery.  Hold supplements for 7 days before surgery.  Hold Ibuprofen (Advil, Motrin) for 1 day before surgery--unless otherwise directed by surgeon.  Hold Naproxen (Aleve) for 4 days before surgery.    -  PLEASE TAKE the following medications the day of surgery   Fexofenadine (Allegra)  Fluticasone (Flonase)    How do I prepare myself?  - Please take 2 showers before surgery using Scrubcare or Hibiclens soap.    Use this soap only from the neck to your toes.     Leave the soap on your skin for one minute--then rinse thoroughly.      You may use your own shampoo and conditioner; no other hair products.   - Please remove all jewelry and body piercings.  - No lotions, deodorants or fragrance.  - No makeup or fingernail polish.   - Bring your ID and insurance card.        - All patients are required to have a Covid-19 test within  4 days of surgery/procedure.      -Patients will be contacted by the [facility] scheduling team within 1 week of surgery to make an appointment.      - Patients may call the Scheduling team at [phone] if they have not been scheduled within 4 days of  surgery.      ALL PATIENTS ARE REQUIRED TO HAVE A RESPONSIBLE ADULT TO DRIVE AND BE IN ATTENDANCE WITH THEM FOR 24 HOURS FOLLOWING SURGERY       Questions or Concerns:    -For questions regarding the day of surgery please contact the Ambulatory Surgery Center at [phone].    -If you have health changes between today and your surgery please contact your surgeon.     For questions after surgery please call your surgeons office.

## 2020-08-24 NOTE — ANESTHESIA PREPROCEDURE EVALUATION
"Anesthesia Pre-Procedure Evaluation    Patient: Nkechi Manrique   MRN:     [id number] Gender:   female   Age:    54 year old :      1965        Preoperative Diagnosis: Nuclear sclerotic cataract of both eyes [H25.13]   Procedure(s):  LEFT CATARACT REMOVAL WITH INTRAOCULAR TORIC LENS IMPLANT     LABS:  CBC:   Lab Results   Component Value Date    WBC 8.6 2017    HGB 13.7 2017    HGB 13.9 2005    HCT 41.2 2017     2017     BMP:   Lab Results   Component Value Date     2017    POTASSIUM 4.0 2017    CHLORIDE 104 2017    CO2 26 2017    BUN 13 2017    CR 0.79 2017     (H) 2019    GLC 94 2017     COAGS:   Lab Results   Component Value Date    PTT 24 2007    INR 1.01 2007     POC: No results found for: BGM, HCG, HCGS  OTHER:   Lab Results   Component Value Date    KIM 9.2 2017    ALBUMIN 4.0 2017    PROTTOTAL 7.7 2017    ALT 26 2017    AST 21 2017    ALKPHOS 77 2017    BILITOTAL 0.4 2017    TSH 1.49 2019    T4 1.02 2019    T3 94 2019        Preop Vitals    BP Readings from Last 3 Encounters:   20 133/76   20 112/74   19 128/78    Pulse Readings from Last 3 Encounters:   20 69   19 72   19 68      Resp Readings from Last 3 Encounters:   20 16   20 16   19 16    SpO2 Readings from Last 3 Encounters:   20 100%   19 97%   19 99%      Temp Readings from Last 1 Encounters:   20 97.3  F (36.3  C) (Tympanic)    Ht Readings from Last 1 Encounters:   20 1.727 m (5' 8\")      Wt Readings from Last 1 Encounters:   20 81.6 kg (180 lb)    Estimated body mass index is 27.37 kg/m  as calculated from the following:    Height as of 3/4/20: 1.727 m (5' 8\").    Weight as of 3/4/20: 81.6 kg (180 lb).     LDA:        Past Medical History:   Diagnosis Date     Abnormal Pap smear  "    Biopsy- ok     Anxiety      Blood dyscrasia     Factor 5 Leiden HTZ     Breast lump 9/27/2013     CMC arthritis, thumb, degenerative 11/3/2015     Congenital deficiency of other clotting factors     Factor V Leiden positive, had superficial thrombus, no DVT     Mild intermittent asthma with exacerbation     seasonally related, rare albuterol     Nonsenile cataract      Plantar fascia syndrome     L ft in '10, R ft in '11     Seasonal allergic rhinitis     claritin spring/August     Stress fracture of lower leg ~2002, 2009     Varicosities 2015    Varicose veins removed      Past Surgical History:   Procedure Laterality Date     BIOPSY  1998 2000 2017    Skin tags moles cysts     COLONOSCOPY  2015    Polyp removed     PHLEBECTOMY MULTIPLE STAB  10/15    [facility], Dr. Ramirez      Allergies   Allergen Reactions     Septra [Bactrim] Hives     Wasps [Hornets] Swelling     Significant leg swelling- epi pen rec in case worsening sx's        Anesthesia Evaluation     . Pt has had prior anesthetic. Type: MAC    No history of anesthetic complications          ROS/MED HX    ENT/Pulmonary: Comment: cataract    (+)Intermittent asthma , . .    Neurologic:  - neg neurologic ROS     Cardiovascular:  - neg cardiovascular ROS   (+) ----. : . . . :. . No previous cardiac testing      (-) taking anticoagulants/antiplatelets   METS/Exercise Tolerance:  >4 METS   Hematologic: Comments: Factor V leiden        Musculoskeletal:  - neg musculoskeletal ROS       GI/Hepatic:     (+) GERD (intermittent)       Renal/Genitourinary:  - ROS Renal section negative       Endo:  - neg endo ROS       Psychiatric:     (+) psychiatric history anxiety      Infectious Disease:  - neg infectious disease ROS       Malignancy:      - no malignancy   Other:    - neg other ROS                     PHYSICAL EXAM:   Mental Status/Neuro: A/A/O   Airway: Facies: Feasible  Mallampati: II  Mouth/Opening: Full  TM distance: > 6 cm  Neck ROM: Full   Respiratory:  Auscultation: CTAB     Resp. Rate: Normal     Resp. Effort: Normal      CV: Rhythm: Regular  Heart: Normal Sounds  Edema: None   Comments:      Dental: Normal Dentition                Assessment:   ASA SCORE: 2    H&P: History and physical reviewed and following examination; no interval change.   Smoking Status:  Non-Smoker/Unknown   NPO Status: NPO Appropriate     Plan:   Anes. Type:  MAC   Pre-Medication: Acetaminophen   Induction:  N/a   Airway: Native Airway   Access/Monitoring: PIV   Maintenance: N/a     Postop Plan:   Postop Pain: Regional  Postop Sedation/Airway: Not planned  Disposition: Outpatient     PONV Management:  NO PONV Prophylaxis Required     CONSENT: Direct conversation   Plan and risks discussed with: Patient   Blood Products: N/a                PAC Discussion and Assessment    ASA Classification: 2  Case is suitable for: [facility]  Anesthetic techniques and relevant risks discussed: MAC with GA as backup  Invasive monitoring and risk discussed:   Types:   Possibility and Risk of blood transfusion discussed:   NPO instructions given:   Additional anesthetic preparation and risks discussed:   Needs early admission to pre-op area:   Other:     PAC Resident/NP Anesthesia Assessment:  Jazmin Manrique is a 54 year old female scheduled for LEFT CATARACT REMOVAL WITH INTRAOCULAR TORIC LENS IMPLANT on 8/28/20 by Dr. Knowles in treatment of cataract.  PAC referral for risk assessment and optimization for anesthesia with comorbid conditions of intermittent asthma, seasonal allergies, factor V leiden, anxiety:    Pre-operative considerations:  1.  Cardiac:  Functional status- METS >4- bikes, runs, regular walks. No reported cardiac history.  Denies cardiac symptoms. low risk surgery with 0.4% (RCRI #) risk of major adverse cardiac event.   2.  Pulm:  Airway feasible.  SAI risk: low. H/o intermittent asthma. No current inhalers- states she has not had recent symptoms requiring any inhaler use. She avoids triggers.  COVID testing ordered per surgery team.  3.  GI:  Risk of PONV score = 2.  If > 2, anti-emetic intervention recommended. Reports intermittent GERD symptoms that are diet controlled.   4. Heme: factor V leiden- no h/o DVT. Not anticoagulated.    VTE risk: 0.26%    Patient is optimized and is acceptable candidate for the proposed procedure.  No further diagnostic evaluation is needed.     **For further details of assessment, testing, and physical exam please see H and P completed on same date.      Darcy Patel PA-C        Mid-Level Provider/Resident:   Date:   Time:     Attending Anesthesiologist Anesthesia Assessment:        Anesthesiologist:   Date:   Time:   Pass/Fail:   Disposition:     PAC Pharmacist Assessment:        Pharmacist:   Date:   Time:    Darcy Patel PA-C

## 2020-08-24 NOTE — H&P
Pre-Operative H & P     CC:  Preoperative exam to assess for increased cardiopulmonary risk while undergoing surgery and anesthesia.    Date of Encounter: 8/24/2020  Primary Care Physician:  Dinorah Alonso  associated diagnosis: cataract    HPI  Nkechi Manrique is a 54 year old female who presents for pre-operative H & P in preparation for LEFT CATARACT REMOVAL WITH INTRAOCULAR TORIC LENS IMPLANT with Dr. Knowles on 8/28/20 at [facility]. Patient is being evaluated for comorbid conditions of intermittent asthma, seasonal allergies, factor V leiden, anxiety        Ms. Manrique has a slowly worsening blurring of her left eye over the past several months. Her glasses are no longer helping with her vision. She was seen by Dr. Knowles for further evaluation.  Above procedure is planned.       History is obtained from the patient and chart review.      Past Medical History  Past Medical History:   Diagnosis Date     Abnormal Pap smear 1990    Biopsy- ok     Anxiety      Blood dyscrasia     Factor 5 Leiden HTZ     Breast lump 9/27/2013     CMC arthritis, thumb, degenerative 11/3/2015     Congenital deficiency of other clotting factors     Factor V Leiden positive, had superficial thrombus, no DVT     Mild intermittent asthma with exacerbation     seasonally related, rare albuterol     Nonsenile cataract      Plantar fascia syndrome     L ft in '10, R ft in '11     Seasonal allergic rhinitis     claritin spring/August     Stress fracture of lower leg ~2002, 2009     Varicosities 2015    Varicose veins removed       Past Surgical History  Past Surgical History:   Procedure Laterality Date     BIOPSY  1998 2000 2017    Skin tags moles cysts     COLONOSCOPY  2015    Polyp removed     MOHS MICROGRAPHIC PROCEDURE       PHLEBECTOMY MULTIPLE STAB  10/15    [facility], Dr. Ramirez       Hx of Blood transfusions/reactions: denies     Hx of abnormal bleeding or anti-platelet use: adenike    Menstrual history:  Patient's last menstrual period was 10/16/2015 (exact date).    Steroid use in the last year: denies    Personal or FH with difficulty with Anesthesia:  denies    Prior to Admission Medications  Current Outpatient Medications   Medication Sig Dispense Refill     fexofenadine (ALLEGRA) 180 MG tablet Take 180 mg by mouth every morning        fluticasone (FLONASE) 50 MCG/ACT nasal spray Spray 2 sprays into both nostrils every evening        EPINEPHrine (EPIPEN/ADRENACLICK/OR ANY BX GENERIC EQUIV) 0.3 MG/0.3ML injection 2-pack Inject 0.3 mLs (0.3 mg) into the muscle once as needed for anaphylaxis 0.3 mL 3       Allergies  Allergies   Allergen Reactions     Septra [Bactrim] Hives     Wasps [Hornets] Swelling     Significant leg swelling- epi pen rec in case worsening sx's       Social History  Social History     Socioeconomic History     Marital status: Single     Spouse name: Not on file     Number of children: Not on file     Years of education: Not on file     Highest education level: Not on file   Occupational History     Employer: [employer]     Comment: Birthplace   Social Needs     Financial resource strain: Not on file     Food insecurity     Worry: Not on file     Inability: Not on file     Transportation needs     Medical: Not on file     Non-medical: Not on file   Tobacco Use     Smoking status: Never Smoker     Smokeless tobacco: Never Used   Substance and Sexual Activity     Alcohol use: Yes     Alcohol/week: 1.0 standard drinks     Comment: 2 drinks per week     Drug use: No     Sexual activity: Yes     Partners: Male     Birth control/protection: Post-menopausal, Male Surgical   Lifestyle     Physical activity     Days per week: Not on file     Minutes per session: Not on file     Stress: Not on file   Relationships     Social connections     Talks on phone: Not on file     Gets together: Not on file     Attends [employer] service: Not on file     Active member of club or organization:  "Not on file     Attends meetings of clubs or organizations: Not on file     Relationship status: Not on file     Intimate partner violence     Fear of current or ex partner: Not on file     Emotionally abused: Not on file     Physically abused: Not on file     Forced sexual activity: Not on file   Other Topics Concern     Not on file   Social History Narrative    Caffeine intake/servings daily - 2-3    Calcium intake/servings daily - 2-3    Exercise 6-7 times weekly - describe biking, swimming, wts    Sunscreen used - Yes    Seatbelts used - Yes    Guns stored in the home - No    Self Breast Exam - No    Pap test up to date -  Yes    Eye exam up to date -  Yes    Dental exam up to date -  Yes    DEXA scan up to date -  Not Applicable    Flex Sig/Colonoscopy up to date -  Not Applicable    Mammography up to date -  Yes    Immunizations reviewed and up to date - Yes    Abuse: Current or Past (Physical, Sexual or Emotional) - No    Do you feel safe in your environment - Yes    Do you cope well with stress - Yes    Do you suffer from insomnia - No    Rosy Lam LPN      '10                               Family History  Family History   Problem Relation Age of Onset     Hypertension Mother         meds     Depression Mother      Cerebrovascular Disease Father      Circulatory Father         phlebitis, blood clots in leg veins     Alzheimer Disease Father         dx ~ in mid 60s     Deep Vein Thrombosis (DVT) Father      Breast Cancer Maternal Grandmother         postmenopausal     Cancer Brother         pancreas,  age 25     Psychotic Disorder Sister         ? depression, schizophrenia     Other Cancer Brother         Pancreatic     Cancer Brother         Thymus Cancer--\"Goods Symdrone\"     Genetic Disorder Brother         Autoimmune IGG     Anesthesia Reaction No family hx of      ROS/MED HX    ENT/Pulmonary:     (+)Intermittent asthma , . .    Neurologic:  - neg neurologic ROS     Cardiovascular:  - neg " "cardiovascular ROS   (+) ----. : . . . :. . No previous cardiac testing      (-) taking anticoagulants/antiplatelets   METS/Exercise Tolerance:  >4 METS   Hematologic: Comments: Factor V leiden        Musculoskeletal:  - neg musculoskeletal ROS       GI/Hepatic:     (+) GERD (intermittent)       Renal/Genitourinary:  - ROS Renal section negative       Endo:  - neg endo ROS       Psychiatric:     (+) psychiatric history anxiety      Infectious Disease:  - neg infectious disease ROS       Malignancy:      - no malignancy   Other:    - neg other ROS       The complete review of systems is negative other than noted in the HPI or here.   Temp: 98.2  F (36.8  C) Temp src: Oral BP: 128/83 Pulse: 73   Resp: 15 SpO2: 98 %         174 lbs 0 oz  5' 8\"   Body mass index is 26.46 kg/m .       Physical Exam  Constitutional: Awake, alert, cooperative, no apparent distress, and appears stated age.  Eyes: Pupils equal, round and reactive to light, extra ocular muscles intact  HENT: Normocephalic, oral pharynx with moist mucus membranes, good dentition.   Respiratory: Clear to auscultation bilaterally, no crackles or wheezing.  Cardiovascular: Regular rate and rhythm, normal S1 and S2, and no murmur noted.  Carotids no bruits. No edema. Palpable pulses to radial arteries.   GI: Normal bowel sounds, soft, non-distended, non-tender, no masses palpated  Genitourinary:  deferred  Skin: Warm and dry.  No rashes at anticipated surgical site.   Musculoskeletal: Full ROM of neck. There is no redness, warmth, or swelling of the exposed joints. Gross motor strength is normal.    Neurologic: Awake, alert, oriented to name, place and time. Cranial nerves II-XII are grossly intact. Gait is normal.   Neuropsychiatric: Calm, cooperative. Normal affect.     Labs: (personally reviewed)  Component      Latest Ref Rng & Units 6/29/2017   Sodium      133 - 144 mmol/L 139   Potassium      3.4 - 5.3 mmol/L 4.0   Chloride      94 - 109 mmol/L 104   Carbon " Dioxide      20 - 32 mmol/L 26   Anion Gap      3 - 14 mmol/L 9   Glucose      70 - 99 mg/dL 94   Urea Nitrogen      7 - 30 mg/dL 13   Creatinine      0.52 - 1.04 mg/dL 0.79   GFR Estimate      >60 mL/min/1.7m2 76   GFR Estimate If Black      >60 mL/min/1.7m2 >90 . . .   Calcium      8.5 - 10.1 mg/dL 9.2   WBC      4.0 - 11.0 10e9/L 8.6   RBC Count      3.8 - 5.2 10e12/L 4.60   Hemoglobin      11.7 - 15.7 g/dL 13.7   Hematocrit      35.0 - 47.0 % 41.2   MCV      78 - 100 fl 90   MCH      26.5 - 33.0 pg 29.8   MCHC      31.5 - 36.5 g/dL 33.3   RDW      10.0 - 15.0 % 12.8   Platelet Count      150 - 450 10e9/L 191       Outside records reviewed from: care everywhere    ASSESSMENT and PLAN  Jazmin Manrique is a 54 year old female scheduled for LEFT CATARACT REMOVAL WITH INTRAOCULAR TORIC LENS IMPLANT on 8/28/20 by Dr. Knowles in treatment of cataract.  PAC referral for risk assessment and optimization for anesthesia with comorbid conditions of intermittent asthma, seasonal allergies, factor V leiden, anxiety:    Pre-operative considerations:  1.  Cardiac:  Functional status- METS >4- bikes, runs, regular walks. No reported cardiac history.  Denies cardiac symptoms. low risk surgery with 0.4% (RCRI #) risk of major adverse cardiac event.   2.  Pulm:  Airway feasible.  SAI risk: low. H/o intermittent asthma. No current inhalers- states she has not had recent symptoms requiring any inhaler use. She avoids triggers. COVID testing ordered per surgery team.  3.  GI:  Risk of PONV score = 2.  If > 2, anti-emetic intervention recommended. Reports intermittent GERD symptoms that are diet controlled.   4. Heme: factor V leiden- no h/o DVT. Not anticoagulated.    VTE risk: 0.26%    Patient is optimized and is acceptable candidate for the proposed procedure.  No further diagnostic evaluation is needed.     Darcy Patel PA-C  Preoperative Assessment Center  [facility]  Phone:  [phone]  Fax: [phone]

## 2020-08-25 LAB
SARS-COV-2 RNA SPEC QL NAA+PROBE: NOT DETECTED
SPECIMEN SOURCE: NORMAL

## 2020-08-26 DIAGNOSIS — H25.12 AGE-RELATED NUCLEAR CATARACT, LEFT: Primary | ICD-10-CM

## 2020-08-26 RX ORDER — OFLOXACIN 3 MG/ML
SOLUTION/ DROPS OPHTHALMIC
Qty: 1 BOTTLE | Refills: 0 | Status: SHIPPED | OUTPATIENT
Start: 2020-08-26 | End: 2021-05-27

## 2020-08-26 RX ORDER — PREDNISOLONE ACETATE 10 MG/ML
SUSPENSION/ DROPS OPHTHALMIC
Qty: 1 BOTTLE | Refills: 1 | Status: SHIPPED | OUTPATIENT
Start: 2020-08-26 | End: 2021-05-27

## 2020-08-28 ENCOUNTER — ANESTHESIA (OUTPATIENT)
Dept: SURGERY | Facility: AMBULATORY SURGERY CENTER | Age: 55
End: 2020-08-28

## 2020-08-28 ENCOUNTER — OFFICE VISIT (OUTPATIENT)
Dept: OPHTHALMOLOGY | Facility: CLINIC | Age: 55
End: 2020-08-28
Payer: COMMERCIAL

## 2020-08-28 ENCOUNTER — HOSPITAL ENCOUNTER (OUTPATIENT)
Facility: AMBULATORY SURGERY CENTER | Age: 55
End: 2020-08-28
Attending: OPHTHALMOLOGY
Payer: COMMERCIAL

## 2020-08-28 VITALS
DIASTOLIC BLOOD PRESSURE: 64 MMHG | TEMPERATURE: 97.4 F | SYSTOLIC BLOOD PRESSURE: 117 MMHG | WEIGHT: 174 LBS | OXYGEN SATURATION: 98 % | HEIGHT: 68 IN | RESPIRATION RATE: 16 BRPM | BODY MASS INDEX: 26.37 KG/M2 | HEART RATE: 69 BPM

## 2020-08-28 DIAGNOSIS — Z98.890 POSTOPERATIVE EYE STATE: ICD-10-CM

## 2020-08-28 DIAGNOSIS — H25.12 NUCLEAR SCLEROTIC CATARACT, LEFT: Primary | ICD-10-CM

## 2020-08-28 DIAGNOSIS — H25.13 NUCLEAR SCLEROTIC CATARACT OF BOTH EYES: ICD-10-CM

## 2020-08-28 DIAGNOSIS — Z96.1 PSEUDOPHAKIA, LEFT EYE: Primary | ICD-10-CM

## 2020-08-28 DEVICE — IMPLANTABLE DEVICE: Type: IMPLANTABLE DEVICE | Site: EYE | Status: FUNCTIONAL

## 2020-08-28 RX ORDER — TETRACAINE HYDROCHLORIDE 5 MG/ML
SOLUTION OPHTHALMIC PRN
Status: DISCONTINUED | OUTPATIENT
Start: 2020-08-28 | End: 2020-08-28 | Stop reason: HOSPADM

## 2020-08-28 RX ORDER — DICLOFENAC SODIUM 1 MG/ML
1 SOLUTION/ DROPS OPHTHALMIC
Status: COMPLETED | OUTPATIENT
Start: 2020-08-28 | End: 2020-08-28

## 2020-08-28 RX ORDER — LIDOCAINE HYDROCHLORIDE 10 MG/ML
INJECTION, SOLUTION EPIDURAL; INFILTRATION; INTRACAUDAL; PERINEURAL PRN
Status: DISCONTINUED | OUTPATIENT
Start: 2020-08-28 | End: 2020-08-28 | Stop reason: HOSPADM

## 2020-08-28 RX ORDER — SODIUM CHLORIDE, SODIUM LACTATE, POTASSIUM CHLORIDE, CALCIUM CHLORIDE 600; 310; 30; 20 MG/100ML; MG/100ML; MG/100ML; MG/100ML
500 INJECTION, SOLUTION INTRAVENOUS CONTINUOUS
Status: DISCONTINUED | OUTPATIENT
Start: 2020-08-28 | End: 2020-08-28 | Stop reason: HOSPADM

## 2020-08-28 RX ORDER — CYCLOPENTOLAT/TROPIC/PHENYLEPH 1%-1%-2.5%
1 DROPS (EA) OPHTHALMIC (EYE)
Status: COMPLETED | OUTPATIENT
Start: 2020-08-28 | End: 2020-08-28

## 2020-08-28 RX ORDER — MOXIFLOXACIN IN NACL,ISO-OS/PF 0.3MG/0.3
SYRINGE (ML) INTRAOCULAR PRN
Status: DISCONTINUED | OUTPATIENT
Start: 2020-08-28 | End: 2020-08-28 | Stop reason: HOSPADM

## 2020-08-28 RX ORDER — BALANCED SALT SOLUTION 6.4; .75; .48; .3; 3.9; 1.7 MG/ML; MG/ML; MG/ML; MG/ML; MG/ML; MG/ML
SOLUTION OPHTHALMIC PRN
Status: DISCONTINUED | OUTPATIENT
Start: 2020-08-28 | End: 2020-08-28 | Stop reason: HOSPADM

## 2020-08-28 RX ORDER — ONDANSETRON 4 MG/1
4 TABLET, ORALLY DISINTEGRATING ORAL EVERY 30 MIN PRN
Status: DISCONTINUED | OUTPATIENT
Start: 2020-08-28 | End: 2020-08-29 | Stop reason: HOSPADM

## 2020-08-28 RX ORDER — NALOXONE HYDROCHLORIDE 0.4 MG/ML
.1-.4 INJECTION, SOLUTION INTRAMUSCULAR; INTRAVENOUS; SUBCUTANEOUS
Status: DISCONTINUED | OUTPATIENT
Start: 2020-08-28 | End: 2020-08-29 | Stop reason: HOSPADM

## 2020-08-28 RX ORDER — MEPERIDINE HYDROCHLORIDE 25 MG/ML
12.5 INJECTION INTRAMUSCULAR; INTRAVENOUS; SUBCUTANEOUS
Status: DISCONTINUED | OUTPATIENT
Start: 2020-08-28 | End: 2020-08-29 | Stop reason: HOSPADM

## 2020-08-28 RX ORDER — ONDANSETRON 2 MG/ML
4 INJECTION INTRAMUSCULAR; INTRAVENOUS EVERY 30 MIN PRN
Status: DISCONTINUED | OUTPATIENT
Start: 2020-08-28 | End: 2020-08-29 | Stop reason: HOSPADM

## 2020-08-28 RX ORDER — LIDOCAINE 40 MG/G
CREAM TOPICAL
Status: DISCONTINUED | OUTPATIENT
Start: 2020-08-28 | End: 2020-08-28 | Stop reason: HOSPADM

## 2020-08-28 RX ORDER — OFLOXACIN 3 MG/ML
1 SOLUTION/ DROPS OPHTHALMIC
Status: COMPLETED | OUTPATIENT
Start: 2020-08-28 | End: 2020-08-28

## 2020-08-28 RX ORDER — SODIUM CHLORIDE, SODIUM LACTATE, POTASSIUM CHLORIDE, CALCIUM CHLORIDE 600; 310; 30; 20 MG/100ML; MG/100ML; MG/100ML; MG/100ML
INJECTION, SOLUTION INTRAVENOUS CONTINUOUS
Status: DISCONTINUED | OUTPATIENT
Start: 2020-08-28 | End: 2020-08-29 | Stop reason: HOSPADM

## 2020-08-28 RX ORDER — FENTANYL CITRATE 50 UG/ML
INJECTION, SOLUTION INTRAMUSCULAR; INTRAVENOUS PRN
Status: DISCONTINUED | OUTPATIENT
Start: 2020-08-28 | End: 2020-08-28

## 2020-08-28 RX ORDER — PROPARACAINE HYDROCHLORIDE 5 MG/ML
1 SOLUTION/ DROPS OPHTHALMIC ONCE
Status: COMPLETED | OUTPATIENT
Start: 2020-08-28 | End: 2020-08-28

## 2020-08-28 RX ADMIN — SODIUM CHLORIDE, SODIUM LACTATE, POTASSIUM CHLORIDE, CALCIUM CHLORIDE 500 ML: 600; 310; 30; 20 INJECTION, SOLUTION INTRAVENOUS at 08:56

## 2020-08-28 RX ADMIN — Medication 1 DROP: at 08:47

## 2020-08-28 RX ADMIN — OFLOXACIN 1 DROP: 3 SOLUTION/ DROPS OPHTHALMIC at 08:57

## 2020-08-28 RX ADMIN — Medication 1 DROP: at 08:42

## 2020-08-28 RX ADMIN — OFLOXACIN 1 DROP: 3 SOLUTION/ DROPS OPHTHALMIC at 08:47

## 2020-08-28 RX ADMIN — OFLOXACIN 1 DROP: 3 SOLUTION/ DROPS OPHTHALMIC at 08:42

## 2020-08-28 RX ADMIN — DICLOFENAC SODIUM 1 DROP: 1 SOLUTION/ DROPS OPHTHALMIC at 08:47

## 2020-08-28 RX ADMIN — Medication 1 DROP: at 08:57

## 2020-08-28 RX ADMIN — PROPARACAINE HYDROCHLORIDE 1 DROP: 5 SOLUTION/ DROPS OPHTHALMIC at 08:42

## 2020-08-28 RX ADMIN — DICLOFENAC SODIUM 1 DROP: 1 SOLUTION/ DROPS OPHTHALMIC at 08:57

## 2020-08-28 RX ADMIN — FENTANYL CITRATE 50 MCG: 50 INJECTION, SOLUTION INTRAMUSCULAR; INTRAVENOUS at 09:21

## 2020-08-28 RX ADMIN — DICLOFENAC SODIUM 1 DROP: 1 SOLUTION/ DROPS OPHTHALMIC at 08:42

## 2020-08-28 RX ADMIN — FENTANYL CITRATE 50 MCG: 50 INJECTION, SOLUTION INTRAMUSCULAR; INTRAVENOUS at 09:24

## 2020-08-28 ASSESSMENT — SLIT LAMP EXAM - LIDS: COMMENTS: NORMAL

## 2020-08-28 ASSESSMENT — VISUAL ACUITY
METHOD: SNELLEN - LINEAR
OS_SC: 20/30

## 2020-08-28 ASSESSMENT — TONOMETRY
IOP_METHOD: TONOPEN
OS_IOP_MMHG: 20

## 2020-08-28 ASSESSMENT — MIFFLIN-ST. JEOR: SCORE: 1432.76

## 2020-08-28 NOTE — PROCEDURES
Ophthalmology Post-op Procedure Note    Surgical Service:    Ophthalmology & Visual Sciences  Date Performed:      August 28, 2020  Location: [facility]      Pre-operative Diagnosis: Visually significant cataract, left eye  Post-operative Diagnosis:  Pseudophakia, left eye  Operative Procedure:  Phacoemulsification with intraocular lens implantation, left eye    Surgeon(s):  Fellow/Staff Surgeon:       Dianna Knowles MD  Resident Surgeon:            Suman Cantrell MD    Anesthesia:   Topical/MAC  Findings:  No unusual findings   Blood Loss:    Minimal  Implants:  SN6AT3 18.0D intraocular lens  Specimens:  None     Complications:  The patient did not experience any complications.   Condition: Stable    Operative Report Completion:    Description of Operation/Procedure:    After appropriate informed consent was obtained, the 6:00 position was marked with surgical marking pen in the pre-operative area. Tthe patient was brought to the operating room. The appropriate cardiac and blood pressure monitors were placed. A final pause occurred just before the start of the procedure during which the entire procedure team actively confirmed the correct patient, procedure, site, special equipment and special requirements.The patient was prepped and draped in the usual sterile fashion using 5% povidone/iodine.     An eyelid speculum was placed to open the eyelids. A paracentesis port was placed approximately sixty degrees to the left of the planned temporal incision location using the sideport blade. Approximately 0.8 cc of 1% nonpreserved lidocaine was placed into the anterior chamber. The anterior chamber was filled with dispersive viscoelastic. A clear corneal temporal incision was made with a metal 2.6 mm keratome. A round continuous tear capsulorhexis was initiated with a cystotome and completed with the Utrata forceps. Balanced salt solution on a cannula was used to perform hydrodissection. The nucleus was removed  using phacoemulsification with a chop technique. The remaining cortical material was removed using the irrigation/aspiration handpiece. The capsular bag was filled with cohesive viscoelastic. The 106 degree axis was marked on the cornea with the toric marker. A lens of the model and power listed above was placed into the capsular bag using the cartridge injection system and the toric lens axis aligned with the corneal markings.  The remaining viscoelastic was removed using the irrigation aspiration handpiece. The paracentesis and temporal wounds were hydrated with balanced salt solution. Intracameral moxifloxacin was administered. At the conclusion of the case, the wounds were felt to be watertight, the pupil was round, the lens was centered, and the anterior chamber was deep. A few drops of antibiotic and prednisolone were given to the operative eye. The eyelid speculum was removed. A shield was placed over the operative eye.    Attending Attestation:  I was present for and performed the entire procedure.  Dianna Knowles MD

## 2020-08-28 NOTE — ANESTHESIA POSTPROCEDURE EVALUATION
Anesthesia POST Procedure Evaluation    Patient: Nkechi Manrique   MRN:     [id number] Gender:   female   Age:    54 year old :      1965        Preoperative Diagnosis: Nuclear sclerotic cataract of both eyes [H25.13]   Procedure(s):  LEFT CATARACT REMOVAL WITH INTRAOCULAR TORIC LENS IMPLANT   Postop Comments: No value filed.     Anesthesia Type: MAC       Disposition: Outpatient   Postop Pain Control: Uneventful            Sign Out: Well controlled pain   PONV: No   Neuro/Psych: Uneventful            Sign Out: Acceptable/Baseline neuro status   Airway/Respiratory: Uneventful            Sign Out: Acceptable/Baseline resp. status   CV/Hemodynamics: Uneventful            Sign Out: Acceptable CV status   Other NRE: NONE   DID A NON-ROUTINE EVENT OCCUR? No         Last Anesthesia Record Vitals:  CRNA VITALS  2020 0922 - 2020 1022      2020             Resp Rate (set):  10          Last PACU Vitals:  Vitals Value Taken Time   BP     Temp     Pulse     Resp     SpO2     Temp src Available 2020  9:45 AM   NIBP 111/66 2020  9:49 AM   Pulse 59 2020  9:50 AM   SpO2 99 % 2020  9:50 AM   Resp     Temp     Ht Rate 59 2020  9:50 AM   Temp 2           Electronically Signed By: Fabian Horowitz DO, 2020, 1:57 PM

## 2020-08-28 NOTE — ANESTHESIA CARE TRANSFER NOTE
Patient: Nkechi Manrique    Procedure(s):  LEFT CATARACT REMOVAL WITH INTRAOCULAR TORIC LENS IMPLANT    Diagnosis: Nuclear sclerotic cataract of both eyes [H25.13]  Diagnosis Additional Information: No value filed.    Anesthesia Type:   MAC     Note:  Airway :Room Air  Patient transferred to:Phase II  Comments: Uneventful transport to Phase II: VSS; IV patent; pt comfortable; Report given to RN; pt. Responds appropriately to commandsHandoff Report: Identifed the Patient, Identified the Reponsible Provider, Reviewed the pertinent medical history, Discussed the surgical course, Reviewed Intra-OP anesthesia mangement and issues during anesthesia, Set expectations for post-procedure period and Allowed opportunity for questions and acknowledgement of understanding      Vitals: (Last set prior to Anesthesia Care Transfer)    CRNA VITALS  8/28/2020 0918 - 8/28/2020 0948      8/28/2020             Pulse:  56    Ht Rate:  56    SpO2:  98 %    Resp Rate (set):  10                Electronically Signed By: SANDOR Nicole CRNA  August 28, 2020  9:48 AM

## 2020-08-28 NOTE — DISCHARGE INSTRUCTIONS
[facility]  Home Care Following Anesthesia  For 24 hours after surgery:  1. Get plenty of rest.  A responsible adult must stay with you for at least 24 hours after you leave the surgery center.  2. Do not drive or use heavy equipment.  If you have weakness or tingling, don't drive or use heavy equipment until this feeling goes away.   3. Do not drink alcohol.   4. Avoid strenuous or risky activities.  Ask for help when climbing stairs.  5. You may feel lightheaded.  IF so, sit for a few minutes before standing.  Have someone help you get up.   6. If you have nausea (feel sick to your stomach): Drink only clear liquids such as apple juice, ginger ale, broth or 7-Up.  Rest may also help.  Be sure to drink enough fluids.  Move to a regular diet as you feel able.   7. You may have a slight fever.  Call the doctor if your fever is over 100 F (37.7 C) (taken under the tongue) or lasts longer than 24 hours.  8. You may have a dry mouth, a sore throat, muscle aches or trouble sleeping. These should go away after 24 hours.  9. Do not make important or legal decisions.               Tips for taking pain medications  To get the best pain relief possible, remember these points:    Take pain medications as directed, before pain becomes severe.    Pain medication can upset your stomach: taking it with food may help.    Constipation is a common side effect of pain medication. Drink plenty of  fluids.    Eat foods high in fiber. Take a stool softener if recommended by your doctor or pharmacist.    Do not drink alcohol, drive or operate machinery while taking pain medications.    Ask about other ways to control pain, such as with heat, ice or relaxation.    Tylenol/Acetaminophen Consumption  To help encourage the safe use of acetaminophen, the makers of TYLENOL  have lowered the maximum daily dose for single-ingredient Extra Strength TYLENOL  (acetaminophen) products sold in the [place] from 8  pills per day (4,000 mg) to 6 pills per day (3,000 mg). The dosing interval has also changed from 2 pills every 4-6 hours to 2 pills every 6 hours.    If you feel your pain relief is insufficient, you may take Tylenol/Acetaminophen in addition to your narcotic pain medication.     Be careful not to exceed 3,000 mg of Tylenol/Acetaminophen in a 24 hour period from all sources.    If you are taking extra strength Tylenol/acetaminophen (500 mg), the maximum dose is 6 tablets in 24 hours.    If you are taking regular strength acetaminophen (325 mg), the maximum dose is 9 tablets in 24 hours.    Call a doctor for any of the followin. Signs of infection (fever, growing tenderness at the surgery site, a large amount of drainage or bleeding, severe pain, foul-smelling drainage, redness, swelling).  2. It has been over 8 to 10 hours since surgery and you are still not able to urinate (pass water).  3. Headache for over 24 hours.  4. Numbness, tingling or weakness the day after surgery (if you had spinal anesthesia).  5. Signs of Covid-19 infection (temperature over 100 degrees, shortness of breath, cough, loss of taste/smell, generalized body aches, persistent headache, chills, sore throat, nausea/vomiting/diarrhea)  Your doctor is:       Dr. Dianna Knowles, Ophthalmology: [phone]               Or dial [phone] and ask for the resident on call for:  Ophthalmology  For emergency care, call the:  [facility]:  [phone] (TTY for hearing impaired: [phone])

## 2020-08-28 NOTE — PROGRESS NOTES
POD#0, status post cataract surgery, left eye    No complaints.  Denies eye pain.    Impression/Plan:  Pseudophakia, OS: POD0, good post-operative appearance. IOP reasonable.    Oflox QID for 1 week     Pred Forte 4/3/2/1 taper over 1 month    Eye protection at all times and eye shield at night for 1 week.    Limited activities with no exercise or heavy lifting for 1 week.    Instructed patient to contact us for decreasing vision, eye pain, new floaters or flashes of light or other concerning symptoms.    Written instructions given    Return to clinic 9/14    Suman Cantrell MD  Ophthalmology PGY-3  [facility]  Pager: [phone]    Teaching statement:  Complete documentation of historical and exam elements from today's encounter can be found in the full encounter summary report (not reduplicated in this progress note). I personally obtained the chief complaint(s) and history of present illness.  I confirmed and edited as necessary the review of systems, past medical/surgical history, family history, social history, and examination findings as documented by others; and I examined the patient myself. I personally reviewed the relevant tests, images, and reports as documented above.     I formulated and edited as necessary the assessment and plan and discussed the findings and management plan with the patient and family.    Dianna Knowles MD  Comprehensive Ophthalmology & Ocular Pathology  Department of Ophthalmology and Visual Neurosciences  [email or web address]  Pager [phone]

## 2020-08-28 NOTE — ANESTHESIA CARE TRANSFER NOTE
Patient: Nkechi Manrique    Procedure(s):  LEFT CATARACT REMOVAL WITH INTRAOCULAR TORIC LENS IMPLANT    Diagnosis: Nuclear sclerotic cataract of both eyes [H25.13]  Diagnosis Additional Information: No value filed.    Anesthesia Type:   MAC     Note:  Anesthesia Care Transfer Notewriter    Vitals: (Last set prior to Anesthesia Care Transfer)              Electronically Signed By: SANDOR Nicole CRNA  August 28, 2020  1:13 PM

## 2020-09-14 ENCOUNTER — OFFICE VISIT (OUTPATIENT)
Dept: OPHTHALMOLOGY | Facility: CLINIC | Age: 55
End: 2020-09-14
Attending: OPHTHALMOLOGY
Payer: COMMERCIAL

## 2020-09-14 DIAGNOSIS — Z96.1 PSEUDOPHAKIA, LEFT EYE: Primary | ICD-10-CM

## 2020-09-14 DIAGNOSIS — H52.13 MYOPIC ASTIGMATISM OF BOTH EYES: ICD-10-CM

## 2020-09-14 DIAGNOSIS — H52.4 PRESBYOPIA: ICD-10-CM

## 2020-09-14 DIAGNOSIS — H52.203 MYOPIC ASTIGMATISM OF BOTH EYES: ICD-10-CM

## 2020-09-14 DIAGNOSIS — H25.11 AGE-RELATED NUCLEAR CATARACT, RIGHT: ICD-10-CM

## 2020-09-14 PROCEDURE — 92015 DETERMINE REFRACTIVE STATE: CPT | Mod: ZF

## 2020-09-14 PROCEDURE — G0463 HOSPITAL OUTPT CLINIC VISIT: HCPCS | Mod: ZF

## 2020-09-14 ASSESSMENT — VISUAL ACUITY
OS_SC: 20/20
OD_CC: 20/20
METHOD: SNELLEN - LINEAR

## 2020-09-14 ASSESSMENT — CUP TO DISC RATIO
OD_RATIO: 0.5
OS_RATIO: 0.5

## 2020-09-14 ASSESSMENT — SLIT LAMP EXAM - LIDS
COMMENTS: NORMAL
COMMENTS: NORMAL

## 2020-09-14 ASSESSMENT — REFRACTION_MANIFEST
OS_AXIS: 135
OD_AXIS: 020
OD_ADD: +2.50
OS_SPHERE: -0.50
OD_SPHERE: -3.25
OS_ADD: +2.50
OS_CYLINDER: +0.75
OD_CYLINDER: +1.00

## 2020-09-14 ASSESSMENT — TONOMETRY
IOP_METHOD: TONOPEN
OS_IOP_MMHG: 14
OD_IOP_MMHG: 15

## 2020-09-14 ASSESSMENT — REFRACTION_WEARINGRX
OD_AXIS: 180
OD_ADD: +2.50
OD_CYLINDER: +0.75
OD_SPHERE: -2.75

## 2020-09-14 ASSESSMENT — EXTERNAL EXAM - LEFT EYE: OS_EXAM: NORMAL

## 2020-09-14 ASSESSMENT — EXTERNAL EXAM - RIGHT EYE: OD_EXAM: NORMAL

## 2020-09-14 NOTE — PROGRESS NOTES
HPI:  Nkechi Manrique is a 54 year old female here for follow-up after CE/IOL left eye. She is pleased with her improvement in vision. No pain, redness, discharge. No flashes/floaters.    POH: Refractive error with contact lens wear > 5 years ago, now with glasses correction. S/p CE/IOL left eye. No history of eye trauma.  PMH: No DM or HTN  FH: No glc or AMD    Assessment & Plan   (Z96.1) Pseudophakia, left eye  (primary encounter diagnosis)  Comment: Excellent post-operative appearance  Plan: Complete drop taper    (H25.11) Age-related nuclear cataract, right  Comment: Not visually significant  Plan: Observe for now.    (H52.203,  H52.13) Myopic astigmatism of both eyes  (H52.4) Presbyopia  Comment: Good vision with refraction  Plan: Given updated glasses Rx.     -----------------------------------------------------------------------------------    Patient disposition:   Return in about 1 year (around 9/14/2021). or sooner as needed.    Teaching statement:  Complete documentation of historical and exam elements from today's encounter can be found in the full encounter summary report (not reduplicated in this progress note). I personally obtained the chief complaint(s) and history of present illness.  I confirmed and edited as necessary the review of systems, past medical/surgical history, family history, social history, and examination findings as documented by others; and I examined the patient myself. I personally reviewed the relevant tests, images, and reports as documented above.     I formulated and edited as necessary the assessment and plan and discussed the findings and management plan with the patient and family.      Dianna Knowles MD  Comprehensive Ophthalmology & Ocular Pathology  Department of Ophthalmology and Visual Neurosciences  [email or web address]  Pager [phone]

## 2020-09-14 NOTE — NURSING NOTE
Chief Complaints and History of Present Illnesses   Patient presents with     Post Op (Ophthalmology) Left Eye     Cataract extraction with IOL in the left eye on 08/28/2020     Chief Complaint(s) and History of Present Illness(es)     Post Op (Ophthalmology) Left Eye     Laterality: left eye    Course: gradually improving    Associated symptoms: flashes and dryness.  Negative for eye pain and redness    Treatments tried: eye drops    Pain scale: 0/10    Comments: Cataract extraction with IOL in the left eye on 08/28/2020              Comments     She states that she is using glasses with her left lens popped out.  She sees well in the distance since cataract surgery.  Her vision has been greatly improved since the surgery.  In dim lighting she sees a little flash in her upper visual field of her left eye.      She continues to use her post operative drops as directed.    Rachel Nobles, SIMÓN 7:53 AM  September 14, 2020

## 2020-10-30 ENCOUNTER — TRANSFERRED RECORDS (OUTPATIENT)
Dept: HEALTH INFORMATION MANAGEMENT | Facility: CLINIC | Age: 55
End: 2020-10-30

## 2020-11-25 ENCOUNTER — TRANSFERRED RECORDS (OUTPATIENT)
Dept: HEALTH INFORMATION MANAGEMENT | Facility: CLINIC | Age: 55
End: 2020-11-25

## 2021-01-15 ENCOUNTER — HEALTH MAINTENANCE LETTER (OUTPATIENT)
Age: 56
End: 2021-01-15

## 2021-03-18 ENCOUNTER — ANCILLARY PROCEDURE (OUTPATIENT)
Dept: MAMMOGRAPHY | Facility: CLINIC | Age: 56
End: 2021-03-18
Attending: OBSTETRICS & GYNECOLOGY
Payer: COMMERCIAL

## 2021-03-18 DIAGNOSIS — Z12.31 VISIT FOR SCREENING MAMMOGRAM: ICD-10-CM

## 2021-03-18 PROCEDURE — 77063 BREAST TOMOSYNTHESIS BI: CPT

## 2021-03-18 PROCEDURE — 77067 SCR MAMMO BI INCL CAD: CPT

## 2021-05-27 ENCOUNTER — OFFICE VISIT (OUTPATIENT)
Dept: PEDIATRICS | Facility: CLINIC | Age: 56
End: 2021-05-27
Payer: COMMERCIAL

## 2021-05-27 VITALS
DIASTOLIC BLOOD PRESSURE: 50 MMHG | HEART RATE: 75 BPM | OXYGEN SATURATION: 99 % | SYSTOLIC BLOOD PRESSURE: 100 MMHG | RESPIRATION RATE: 18 BRPM

## 2021-05-27 DIAGNOSIS — T63.441A ALLERGIC REACTION TO BEE STING: ICD-10-CM

## 2021-05-27 DIAGNOSIS — I83.812 VARICOSE VEINS OF LEFT LOWER EXTREMITY WITH PAIN: ICD-10-CM

## 2021-05-27 DIAGNOSIS — J98.01 BRONCHOSPASM: Primary | ICD-10-CM

## 2021-05-27 PROCEDURE — 99213 OFFICE O/P EST LOW 20 MIN: CPT | Performed by: NURSE PRACTITIONER

## 2021-05-27 RX ORDER — EPINEPHRINE 0.3 MG/.3ML
0.3 INJECTION SUBCUTANEOUS
Qty: 0.3 ML | Refills: 3 | Status: SHIPPED | OUTPATIENT
Start: 2021-05-27 | End: 2023-11-27

## 2021-05-27 RX ORDER — ALBUTEROL SULFATE 90 UG/1
2 AEROSOL, METERED RESPIRATORY (INHALATION) EVERY 6 HOURS
Qty: 18 G | Refills: 0 | Status: SHIPPED | OUTPATIENT
Start: 2021-05-27 | End: 2023-04-18

## 2021-05-27 NOTE — PATIENT INSTRUCTIONS
Patient Education     Varicose Veins  Varicose veins are swollen, enlarged veins most often found in the legs. They are usually blue or purple in color and may bulge, twist, and stand out under the skin.   Normally, veins return blood from the body to the heart. The leg veins have one-way valves that prevent blood from flowing backward in the vein. When the valves are weak or damaged, blood backs up in the veins. This may cause some of the veins to swell and bulge and become varicose veins.   Symptoms  Varicose veins may or may not cause symptoms. If symptoms do occur, they can include:    Legs that feel tired, achy, heavy, or itchy    Leg muscle cramps    Skin changes, such as discoloration, dryness, redness, or rash (in more severe cases, you may also have sores on the skin called venous leg ulcers)  Risk factors  There are a number of factors that increase the risk for varicose veins. These can include:     Being a woman    Being older    Sitting or standing for long periods    Being overweight    Being pregnant    Having a family history of varicose veins  Treatment starts with simple self-help measures (see below). If these don t help, there are many procedures that can be done to shrink or remove varicose veins. Your healthcare provider can tell you more about these options, if needed.   Home care    Support or compression stockings will likely be prescribed. If so, be sure to wear them as directed. They may help improve blood flow.    Exercising helps strengthen your leg muscles and improve blood flow. To get the most benefit, choose exercises such as walking, swimming, or cycling. Also try to exercise for at least 30 minutes on most days.    Raising (elevating) your legs lets gravity help blood flow back to the heart. Sit or lie with your feet above heart level a few times throughout the day, or as directed.    Don't sit or stand for long periods. Change positions often. Also, move your ankles, toes and  knees often. This may also help improve blood flow.    If you are overweight, talk with your healthcare provider about setting up a weight-loss plan. Maintaining a healthy weight can help reduce the strain on your veins. It may also improve symptoms, such as swelling and aching.    If you have dryness and itching, ask your provider about special lotions that can be applied to the skin to help improve symptoms.    Follow-up care  Follow up with your healthcare provider, or as directed. If imaging tests were done, you ll be told the results and if there are any new findings that affect your care.   When to seek medical advice  Call your healthcare provider right away if any of these occur:    Sudden, severe leg swelling, pain, or redness    Symptoms worsen, or they don t improve with self-care    Bleeding from any affected veins    Ulcers form on the legs, ankles, or feet    Fever of 100.4 F (38 C) or higher, or as advised by your provider  Shirley last reviewed this educational content on 4/1/2018 2000-2021 [employer]. All rights reserved. This information is not intended as a substitute for professional medical care. Always follow your healthcare professional's instructions.           Patient Education     Surgery for Varicose Veins  If you have large varicose veins, surgery may be the best choice. But it will not prevent new varicose veins from forming. Surgery is most often done in a hospital or surgery center on an outpatient basis.  Varicose vein surgery    Your surgery will be tailored to your needs. Varicose veins may be tied off (ligation), destroyed, or removed. Blood will then flow through the healthy veins. One or more of the following techniques may be used:  Vein stripping and ligation  In more severe cases, the surgeon may tie off and remove veins by making smaller cuts in the skin. Smaller branching veins may also be tied off or removed.  Microphlebectomy or ambulatory phlebectomy  A  special hook is used to gently take out a varicose vein through tiny incisions. Microphlebectomy may be done in your healthcare provider s office.  Sclerotherapy  Your healthcare provider will inject the varicose vein with a special chemical that will quickly close the vein from the inside. This is particularly useful for smaller veins.  PIN stripping  All or part of the vein may be removed with a stripping instrument.  Ablation (laser or radiofrequency)  A tiny cut in the skin is made near the varicose vein. A small tube called a catheter is inserted into the vein. Energy or heat released from the catheter tip will make the vein walls collapse and stick together, stopping all blood flow through the vein.   Know about the risks  Your healthcare provider will talk with you about the risks of surgery. These include:    Bleeding or swelling    A sense of numbness, burning, or tingling in areas near the procedure    Edema or swelling in the legs    Clots in the deep veins that may travel to the lungs    Infection    Scarring  Shirley last reviewed this educational content on 12/1/2019 2000-2021 [employer]. All rights reserved. This information is not intended as a substitute for professional medical care. Always follow your healthcare professional's instructions.

## 2021-05-27 NOTE — PROGRESS NOTES
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  Refill given  Stable  - EPINEPHrine (ANY BX GENERIC EQUIV) 0.3 MG/0.3ML injection 2-pack; Inject 0.3 mLs (0.3 mg) into the muscle once as needed for anaphylaxis    Bronchospasm  Refill given  ACT 25  - albuterol (PROAIR HFA/PROVENTIL HFA/VENTOLIN HFA) 108 (90 Base) MCG/ACT inhaler; Inhale 2 puffs into the lungs every 6 hours    Varicose veins of left lower extremity with pain  Referral given  Continue to wear compression stockings  - VASCULAR SURGERY REFERRAL; Future    No LOS data to display   Time spent doing chart review, history and exam, documentation and further activities per the note         See Patient Instructions  Return in about 3 months (around 8/27/2021).    Magalie Hoyos NP  [facility] [place] Arboleda is a 55 year old who presents for the following health issues:    HPI     Patient states that she had vericose veins removed at the [place] [facility] in 2014. Patient states that she has more behind her left knee that she can notice at the end of the day, feels achy and tender.    Varicose Veins:  -Hx of bilateral leg varicose veins  -Surgery 2014  -Left leg varicose veins worsening over the past few years  -Wears compression stockings  -Needs referral    Asthma:  -Needs refill of albuterol inhaler    Allergies:  -Needs refill of epi pen    Needs physical exam.  COVID shot complete      Review of Systems   Constitutional, HEENT, cardiovascular, pulmonary, gi and gu systems are negative, except as otherwise noted.      Objective    /50 (BP Location: Right arm, Patient Position: Sitting, Cuff Size: Adult Regular)   Pulse 75   Temp (!) 65  F (18.3  C) (Tympanic)   Resp 18   LMP 10/16/2015 (Exact Date)   SpO2 99%   There is no height or weight on file to calculate BMI.     Physical Exam   GENERAL: healthy, alert and no distress  SKIN: no suspicious lesions or rashes and varicose veins to left posterior knee  PSYCH:  mentation appears normal, affect normal/bright

## 2021-05-28 ENCOUNTER — TELEPHONE (OUTPATIENT)
Dept: VASCULAR SURGERY | Facility: CLINIC | Age: 56
End: 2021-05-28

## 2021-05-28 DIAGNOSIS — I83.892 SYMPTOMATIC VARICOSE VEINS OF LEFT LOWER EXTREMITY: Primary | ICD-10-CM

## 2021-05-28 ASSESSMENT — ASTHMA QUESTIONNAIRES: ACT_TOTALSCORE: 25

## 2021-05-28 NOTE — TELEPHONE ENCOUNTER
I called and left a voicemail including my call back number..  I called to find out more about her varicose veins, will get needed imaging and clinic provider appointment once hear back.  YUNIER Foote RN, BSN  Interventional Radiology Nurse Coordinator   Phone:  [phone]

## 2021-06-03 NOTE — TELEPHONE ENCOUNTER
I called 2nd attempt and left vm.  I will send a hi5 message to help with vascular referral regarding her leg issues.  YUNIER Foote RN, BSN  Interventional Radiology Nurse Coordinator   Phone:  [phone]

## 2021-06-04 ENCOUNTER — TELEPHONE (OUTPATIENT)
Dept: RADIOLOGY | Facility: CLINIC | Age: 56
End: 2021-06-04

## 2021-06-04 NOTE — TELEPHONE ENCOUNTER
SHANEKA & Chris Msg on 6/4/21 RC  Letting pt  Know that Dr.Julie Hoyos has asked that we reach out to the pt to schedule with Dr. David.   in Vascular Surgery would like to see the pt on 6/23/21 for and in person visit. Prior to that visit  would like her to have a Left venous Comp ultrasound done on the same day.  Jimenez Jordan on 6/4/2021 at 1:30 PM

## 2021-06-04 NOTE — TELEPHONE ENCOUNTER
I called and spoke with Nkechi.  She has a varicosity behind her left knee, in 2012 she had a vein procedure and did vein stripping or microphlebectomy, on both legs.  Currently behind left knee, discomfort, wears compression stocking knee high.  No known blood clots, no recent imaging.  No swelling noted.  Will get Left venous comp and have her scheduled for in person with Dr David per her preference.  YUNIER Foote RN, BSN  Interventional Radiology Nurse Coordinator   Phone:  [phone]

## 2021-06-04 NOTE — TELEPHONE ENCOUNTER
----- Message from Effie Foote RN sent at 6/4/2021 12:41 PM CDT -----  Regarding: New varicose vein LLE  Please schedule pt Left venous Comp ultrasound and then an in person visit with Dr David on 6/23  [facility]  Pt wants 6/23    Thanks,  YUNIER Foote RN, BSN  Interventional Radiology Nurse Coordinator   Phone:  [phone]

## 2021-06-16 NOTE — TELEPHONE ENCOUNTER
DIAGNOSIS: New varicose vein LLE  unfortunately imaging had no availability on 6/23 same day as pts MD appt   DATE RECEIVED: 5.27.21   NOTES STATUS DETAILS   OFFICE NOTE from referring provider Internal 5.27.21 DEBBIE Hoyos [facility]   OFFICE NOTE from other specialist N/A    OPERATIVE REPORT N/A    MEDICATION LIST Internal    PERTINENT LABS Internal    CTA (CT ANGIOGRAPHY) N/A    CT N/A    MRI N/A    ULTRASOUND N/A

## 2021-06-22 ENCOUNTER — ANCILLARY PROCEDURE (OUTPATIENT)
Dept: ULTRASOUND IMAGING | Facility: CLINIC | Age: 56
End: 2021-06-22
Attending: RADIOLOGY
Payer: COMMERCIAL

## 2021-06-22 DIAGNOSIS — I83.892 SYMPTOMATIC VARICOSE VEINS OF LEFT LOWER EXTREMITY: ICD-10-CM

## 2021-06-22 PROCEDURE — 93971 EXTREMITY STUDY: CPT | Mod: LT | Performed by: RADIOLOGY

## 2021-06-23 ENCOUNTER — OFFICE VISIT (OUTPATIENT)
Dept: VASCULAR SURGERY | Facility: CLINIC | Age: 56
End: 2021-06-23
Payer: COMMERCIAL

## 2021-06-23 ENCOUNTER — PRE VISIT (OUTPATIENT)
Dept: VASCULAR SURGERY | Facility: CLINIC | Age: 56
End: 2021-06-23

## 2021-06-23 VITALS — SYSTOLIC BLOOD PRESSURE: 113 MMHG | HEART RATE: 89 BPM | OXYGEN SATURATION: 96 % | DIASTOLIC BLOOD PRESSURE: 72 MMHG

## 2021-06-23 DIAGNOSIS — I83.812 VARICOSE VEINS OF LEFT LOWER EXTREMITY WITH PAIN: ICD-10-CM

## 2021-06-23 PROCEDURE — 99203 OFFICE O/P NEW LOW 30 MIN: CPT | Performed by: RADIOLOGY

## 2021-06-23 ASSESSMENT — PAIN SCALES - GENERAL: PAINLEVEL: NO PAIN (0)

## 2021-06-23 NOTE — LETTER
2021       RE: Nkechi Manrique  [address] [address]     Dear Colleague,    Thank you for referring your patient, Nkechi Manrique, to the [facility] [place] at [facility]. Please see a copy of my visit note below.    Ms. Manrique is a healthy 56 yo female who presents for discussion of lower extremity varicose veins. She has a remote history of prior vein stripping, about 13 years ago. She has no acute symptoms, only long standing chronic symptoms. She has a focal area of prominent varicosity in the left posterior/lateral knee area, described as an area of fullness and achiness which is worse in the evening and night. There is a mild discomfort. She does wear knee high compression stockings but they terminate below this area. She has some mild symptoms with standing and walking. She does report some mild swelling. She does not have any functional or activity restrictions.    PE:  /72 (BP Location: Right arm, Patient Position: Chair, Cuff Size: Adult Regular)   Pulse 89   LMP 10/16/2015 (Exact Date)   SpO2 96%   Extremities: No pitting edema. +2 pta/dta b/l. Minimal bronzing, no lipodermatosclerosis, no ulcers. Area of varicosities along the lateral posterior knee at the flexion crease.    Imaging: I reviewed the left venous incompetency ultrasound from 21 which demonstrates:    LEFT LEG:       A. No superficial or deep venous thrombosis demonstrated.       B. No incompetent deep veins.       C. Incompetent superficial veins: Great saphenous vein at the mid  calf.       D. Incompetent  and varicose veins as described above.    Imagin yo w/c2 left leg disease with mild symptoms and focal symptoms at a small cluster of varicose veins in the lateral flexion crease of the left knee. Symptoms likely exacerbated by location of the veins. Currently wearing stockings that terminate below the cluster  of veins. Will prescribe thigh high compression from better management. If this does not control her symptoms well enough, we could attempt to sclerose the offending incompetent veins in the left leg.       Again, thank you for allowing me to participate in the care of your patient.      Sincerely,    Devonte David MD

## 2021-06-28 NOTE — PROGRESS NOTES
Ms. Manrique is a healthy 54 yo female who presents for discussion of lower extremity varicose veins. She has a remote history of prior vein stripping, about 13 years ago. She has no acute symptoms, only long standing chronic symptoms. She has a focal area of prominent varicosity in the left posterior/lateral knee area, described as an area of fullness and achiness which is worse in the evening and night. There is a mild discomfort. She does wear knee high compression stockings but they terminate below this area. She has some mild symptoms with standing and walking. She does report some mild swelling. She does not have any functional or activity restrictions.    PE:  /72 (BP Location: Right arm, Patient Position: Chair, Cuff Size: Adult Regular)   Pulse 89   LMP 10/16/2015 (Exact Date)   SpO2 96%   Extremities: No pitting edema. +2 pta/dta b/l. Minimal bronzing, no lipodermatosclerosis, no ulcers. Area of varicosities along the lateral posterior knee at the flexion crease.    Imaging: I reviewed the left venous incompetency ultrasound from 21 which demonstrates:    LEFT LEG:       A. No superficial or deep venous thrombosis demonstrated.       B. No incompetent deep veins.       C. Incompetent superficial veins: Great saphenous vein at the mid  calf.       D. Incompetent  and varicose veins as described above.    Imagin yo w/c2 left leg disease with mild symptoms and focal symptoms at a small cluster of varicose veins in the lateral flexion crease of the left knee. Symptoms likely exacerbated by location of the veins. Currently wearing stockings that terminate below the cluster of veins. Will prescribe thigh high compression from better management. If this does not control her symptoms well enough, we could attempt to sclerose the offending incompetent veins in the left leg.

## 2021-08-09 NOTE — PROGRESS NOTES
SUBJECTIVE: Nkechi Manrique , a 53 year old  female, presents for counseling and information regarding upcoming travel to [facility]. Special medical concerns include: none. She anticipates the following unusual exposures: none.    Itinerary:  [facility]     Departure Date: 01/01/20 Return date: 01/15/20    Reason for travel (i.e. Business, pleasure): pleasure    Visiting an urban or rural area?: both     Accommodations (i.e. hotel, hostel, friends, family, etc): all     Women - First day of your last period: N/A    IMMUNIZATION HISTORY  Have you received any vaccinations in the past 4 weeks?  No  Have you ever fainted from having your blood drawn or from an injection?  No  Have you ever had a fever reaction to vaccination?  No  Have you ever had any bad reaction or side effect from any vaccination?  No  Have you ever had hepatitis A or B vaccine?  Yes   Do you live (or work closely) with anyone who has AIDS, an AIDS-like condition, any other immune disorder or who is on chemotherapy for cancer?  No  Have you received any injection of immune globulin or any blood products during the past 12 months?  No    GENERAL MEDICAL HISTORY  Do you have a medical condition that warrants maintenance medication or physician follow-up?  No  Do you have a medical condition that is stable now, but that may recur while traveling?  No  Has your spleen been removed?  No  Have you had an acute illness or a fever in the past 48 hours?  No  Are you pregnant, or might you become pregnant on this trip?  Any chance of pregnancy?  No  Are you breastfeeding?  No  Do you have HIV, AIDS, an AIDS-like condition, any other immune disorder, leukemia or cancer?  No  Do you have a severe combined immunodeficiency disease?  No  Have you had your thymus gland removed or history of problems with your thymus, such as myasthenia gravis, DiGeorge syndrome, or thymoma?  No    Do you have severe thrombocytopenia (low platelet count) or a coagulation  disorder?  Yes  Have you ever had a convulsion, seizure, epilepsy, neurologic condition or brain infection?  No  Do you have any stomach conditions?  Yes   Do you have a G6PD deficiency?  No  Do you have severe renal or kidney impairment?  No  Do you have a history of psychiatric problems?  No  Do you have a problem with strange dreams and/or nightmares?  No  Do you have insomnia?  No  Do you have problems with vaginitis?  No  Do you have psoriasis?  No  Are you prone to motion sickness?  Yes   Have you ever had headaches, nausea, vomiting, or breathing problems from altitude exposure?  No      Past Medical History:   Diagnosis Date     Abnormal Pap smear 1990    Biopsy- ok     Anxiety      Blood dyscrasia     Factor 5 Leiden HTZ     Breast lump 9/27/2013     CMC arthritis, thumb, degenerative 11/3/2015     Congenital deficiency of other clotting factors     Factor V Leiden positive, had superficial thrombus, no DVT     Mild intermittent asthma with exacerbation     seasonally related, rare albuterol     Plantar fascia syndrome     L ft in '10, R ft in '11     Seasonal allergic rhinitis     claritin spring/August     Stress fracture of lower leg ~2002, 2009     Varicosities 2015    Varicose veins removed      Immunization History   Administered Date(s) Administered     HEPA 01/01/1999, 08/20/2004     HepB 01/01/1994     Influenza (H1N1) 10/19/2009     Influenza (intradermal) 10/03/2011, 10/02/2012     Influenza Quad, Recombinant, p-free (RIV4) 10/16/2019     Influenza Vaccine IM > 6 months Valent IIV4 09/27/2013, 10/28/2014     Influenza Vaccine, 3 YRS +, IM (QUADRIVALENT W/PRESERVATIVES) 10/12/2015     Pneumococcal 23 valent 04/01/2011     TD (ADULT, 7+) 03/01/1971, 06/09/2003, 06/12/2019     TDAP Vaccine (Adacel) 11/27/2007     Typhoid Oral 08/20/2004     Varicella Pt Report Hx of Varicella/Chicken Pox 01/01/1965     Yellow Fever 08/20/2004     Zoster vaccine recombinant adjuvanted (SHINGRIX) 10/31/2018,  02/05/2019       Current Outpatient Medications   Medication Sig Dispense Refill     albuterol (ALBUTEROL) 108 (90 BASE) MCG/ACT inhaler Inhale 1-2 puffs into the lungs every 6 hours as needed for shortness of breath / dyspnea (Patient not taking: Reported on 10/31/2018) 2 Inhaler 5     cefdinir (OMNICEF) 300 MG capsule Take 1 capsule (300 mg) by mouth 2 times daily 10 capsule 0     EPINEPHrine (EPIPEN/ADRENACLICK/OR ANY BX GENERIC EQUIV) 0.3 MG/0.3ML injection 2-pack Inject 0.3 mLs (0.3 mg) into the muscle once as needed for anaphylaxis 0.3 mL 3     fluticasone (FLONASE) 50 MCG/ACT nasal spray Spray 2 sprays into both nostrils daily       phenazopyridine (PYRIDIUM) 200 MG tablet Take 1 tablet (200 mg) by mouth 3 times daily as needed for irritation 9 tablet 0     PROGESTERONE 100MG CAPSULES COMPOUND 1-2 capsules by mouth every night at bedtime (Patient not taking: Reported on 6/28/2019) 180 capsule 3     RaNITidine HCl (ZANTAC PO)        vitamin B complex with vitamin C (VITAMIN  B COMPLEX) TABS tablet Take 1 tablet by mouth daily       VITAMIN D, CHOLECALCIFEROL, PO Take by mouth daily       Allergies   Allergen Reactions     Septra [Bactrim] Hives     Wasps [Hornets] Swelling     Significant leg swelling- epi pen rec in case worsening sx's        EXAM: deferred    Immunizations discussed include: Typhoid  Malaraia prophylaxis recommended: not needed- [place]  Symptomatic treatment for traveler's diarrhea: bismuth subsalicylate, loperamide/diphenoxylate and azithromycin    ASSESSMENT/PLAN:    (Z71.84) Encounter for counseling for travel  (primary encounter diagnosis)    Comment: Oral typhoid vaccines today. Patient will return or follow-up with PCP as needed. Prophylaxis given for Traveler's diarrhea and is not needed for Malaria. All questions were answered.     Plan: typhoid (VIVOTIF) CR capsule, azithromycin         (ZITHROMAX) 500 MG tablet              I have reviewed general recommendations  for safe travel   including: food/water precautions, insect avoidance, safe sex   practices given high prevalence of HIV and other STDs,   roadway safety. Educational materials and links to the CDC   Traveler's health website have been provided.    Total time 20 minutes, greater than 50 percent in counseling   and coordination of care.         Wartpeel Counseling:  I discussed with the patient the risks of Wartpeel including but not limited to erythema, scaling, itching, weeping, crusting, and pain.

## 2021-08-30 ENCOUNTER — OFFICE VISIT (OUTPATIENT)
Dept: PEDIATRICS | Facility: CLINIC | Age: 56
End: 2021-08-30
Payer: COMMERCIAL

## 2021-08-30 ENCOUNTER — OFFICE VISIT (OUTPATIENT)
Dept: OPHTHALMOLOGY | Facility: CLINIC | Age: 56
End: 2021-08-30
Attending: OPHTHALMOLOGY
Payer: COMMERCIAL

## 2021-08-30 VITALS
WEIGHT: 181.1 LBS | TEMPERATURE: 97.5 F | SYSTOLIC BLOOD PRESSURE: 112 MMHG | DIASTOLIC BLOOD PRESSURE: 64 MMHG | BODY MASS INDEX: 27.45 KG/M2 | OXYGEN SATURATION: 99 % | HEART RATE: 69 BPM | HEIGHT: 68 IN | RESPIRATION RATE: 14 BRPM

## 2021-08-30 DIAGNOSIS — H52.4 PRESBYOPIA: ICD-10-CM

## 2021-08-30 DIAGNOSIS — Z11.4 SCREENING FOR HIV (HUMAN IMMUNODEFICIENCY VIRUS): ICD-10-CM

## 2021-08-30 DIAGNOSIS — Z96.1 PSEUDOPHAKIA, LEFT EYE: ICD-10-CM

## 2021-08-30 DIAGNOSIS — J45.20 MILD INTERMITTENT ASTHMA WITHOUT COMPLICATION: ICD-10-CM

## 2021-08-30 DIAGNOSIS — Z13.220 SCREENING FOR HYPERLIPIDEMIA: ICD-10-CM

## 2021-08-30 DIAGNOSIS — H52.13 MYOPIC ASTIGMATISM OF BOTH EYES: ICD-10-CM

## 2021-08-30 DIAGNOSIS — H52.203 MYOPIC ASTIGMATISM OF BOTH EYES: ICD-10-CM

## 2021-08-30 DIAGNOSIS — H57.89 REDNESS OR DISCHARGE OF EYE: ICD-10-CM

## 2021-08-30 DIAGNOSIS — M18.9 OSTEOARTHRITIS OF CARPOMETACARPAL (CMC) JOINT OF THUMB, UNSPECIFIED LATERALITY, UNSPECIFIED OSTEOARTHRITIS TYPE: ICD-10-CM

## 2021-08-30 DIAGNOSIS — Z00.00 ROUTINE GENERAL MEDICAL EXAMINATION AT A HEALTH CARE FACILITY: Primary | ICD-10-CM

## 2021-08-30 DIAGNOSIS — H25.811 COMBINED FORM OF AGE-RELATED CATARACT, RIGHT EYE: Primary | ICD-10-CM

## 2021-08-30 DIAGNOSIS — D03.72 MELANOMA IN SITU OF LEFT LOWER EXTREMITY INCLUDING HIP (H): ICD-10-CM

## 2021-08-30 DIAGNOSIS — D68.51 FACTOR 5 LEIDEN MUTATION, HETEROZYGOUS (H): ICD-10-CM

## 2021-08-30 PROCEDURE — 99396 PREV VISIT EST AGE 40-64: CPT | Performed by: NURSE PRACTITIONER

## 2021-08-30 PROCEDURE — G0463 HOSPITAL OUTPT CLINIC VISIT: HCPCS

## 2021-08-30 PROCEDURE — 92015 DETERMINE REFRACTIVE STATE: CPT

## 2021-08-30 PROCEDURE — 92014 COMPRE OPH EXAM EST PT 1/>: CPT | Mod: GC | Performed by: OPHTHALMOLOGY

## 2021-08-30 ASSESSMENT — ENCOUNTER SYMPTOMS
HEMATOCHEZIA: 0
DYSURIA: 0
EYE PAIN: 0
SHORTNESS OF BREATH: 0
HEMATURIA: 0
MYALGIAS: 0
CHILLS: 0
NERVOUS/ANXIOUS: 0
ARTHRALGIAS: 1
SORE THROAT: 0
HEARTBURN: 0
FREQUENCY: 0
PARESTHESIAS: 0
WEAKNESS: 0
PALPITATIONS: 0
COUGH: 1
DIZZINESS: 0
JOINT SWELLING: 0
CONSTIPATION: 0
HEADACHES: 0
DIARRHEA: 0
NAUSEA: 0
FEVER: 0
ABDOMINAL PAIN: 0

## 2021-08-30 ASSESSMENT — REFRACTION_MANIFEST
OD_SPHERE: -3.50
OS_AXIS: 155
OS_CYLINDER: +0.50
OS_ADD: +2.50
OD_ADD: +2.50
OD_CYLINDER: +1.00
OD_AXIS: 020
OS_SPHERE: -0.25

## 2021-08-30 ASSESSMENT — CONF VISUAL FIELD
METHOD: COUNTING FINGERS
OS_NORMAL: 1

## 2021-08-30 ASSESSMENT — CUP TO DISC RATIO
OD_RATIO: 0.5
OS_RATIO: 0.5

## 2021-08-30 ASSESSMENT — MIFFLIN-ST. JEOR: SCORE: 1459.96

## 2021-08-30 ASSESSMENT — VISUAL ACUITY
OD_SC+: -1
OS_SC: 20/20
METHOD: SNELLEN - LINEAR
OD_SC: 20/100

## 2021-08-30 ASSESSMENT — SLIT LAMP EXAM - LIDS
COMMENTS: NORMAL
COMMENTS: NORMAL

## 2021-08-30 ASSESSMENT — EXTERNAL EXAM - LEFT EYE: OS_EXAM: NORMAL

## 2021-08-30 ASSESSMENT — TONOMETRY
OD_IOP_MMHG: 16
IOP_METHOD: ICARE
OS_IOP_MMHG: 15

## 2021-08-30 ASSESSMENT — EXTERNAL EXAM - RIGHT EYE: OD_EXAM: NORMAL

## 2021-08-30 NOTE — PATIENT INSTRUCTIONS
Arthritis: Volteran gel. We can always refer to ortho to discuss injection      Preventive Health Recommendations  Female Ages 50 - 64    Yearly exam: See your health care provider every year in order to  o Review health changes.   o Discuss preventive care.    o Review your medicines if your doctor has prescribed any.      Get a Pap test every three years (unless you have an abnormal result and your provider advises testing more often).    If you get Pap tests with HPV test, you only need to test every 5 years, unless you have an abnormal result.     You do not need a Pap test if your uterus was removed (hysterectomy) and you have not had cancer.    You should be tested each year for STDs (sexually transmitted diseases) if you're at risk.     Have a mammogram every 1 to 2 years.    Have a colonoscopy at age 50, or have a yearly FIT test (stool test). These exams screen for colon cancer.      Have a cholesterol test every 5 years, or more often if advised.    Have a diabetes test (fasting glucose) every three years. If you are at risk for diabetes, you should have this test more often.     If you are at risk for osteoporosis (brittle bone disease), think about having a bone density scan (DEXA).    Shots: Get a flu shot each year. Get a tetanus shot every 10 years.    Nutrition:     Eat at least 5 servings of fruits and vegetables each day.    Eat whole-grain bread, whole-wheat pasta and brown rice instead of white grains and rice.    Get adequate Calcium and Vitamin D.     Lifestyle    Exercise at least 150 minutes a week (30 minutes a day, 5 days a week). This will help you control your weight and prevent disease.    Limit alcohol to one drink per day.    No smoking.     Wear sunscreen to prevent skin cancer.     See your dentist every six months for an exam and cleaning.    See your eye doctor every 1 to 2 years.

## 2021-08-30 NOTE — PROGRESS NOTES
HPI:  Nkechi Manrique is a 54 year old female here for dilated eye exam and follow-up of right eye cataract. She has noted increasing haloes around lights with right eye at night. The left eye vision seems crisp, but not as clear as it was right after surgery.     Enlarged vessel in the left eye that has been there for months. Does not seem to be clearing. No associated trauma. No flashes, floaters, light sensitivity.     POH: Refractive error with contact lens wear > 5 years ago, now with glasses correction. S/p CE/IOL left eye. No history of eye trauma.  PMH: No DM or HTN  FH: No glc or AMD    Assessment & Plan   (H25.811) Combined form of age-related cataract, right eye  (primary encounter diagnosis)  Comment: Still with BCVA of 20/20, although some mild myopic shift.  Plan: If glare begins to limit ADLs, if myopic shift results in intolerable anisometropia, or if vision not correctable to 20/20, then will consider CE/IOL    (Z96.1) Pseudophakia, left eye  Comment: Mild PCO, some central. May be giving her the sense that left eye is not as clear as right after surgery.  Plan: Discussed r/b/a of YAG capsulotomy, but she'd like to monitor for now. I think that is reasonable.     (H57.89) Redness of left eye  Comment: Single prominent nasal vessel - does not appear concerning (not a sentinel vessel), and no surface inflammation.  Plan: Recommend ATs and offered reassurance    (H52.203,  H52.13) Myopic astigmatism of both eyes  (H52.4) Presbyopia  Comment: Good vision with refraction  Plan: Given updated glasses Rx.     -----------------------------------------------------------------------------------    Patient disposition:   Return in about 1 year (around 8/30/2022) for dilated eye exam, or sooner as needed.     Rafael Pritchard, DO   PGY-5 Neuro-Ophthalmology Fellow    Teaching statement:  Complete documentation of historical and exam elements from today's encounter can be found in the full encounter summary report  (not reduplicated in this progress note). I personally obtained the chief complaint(s) and history of present illness.  I confirmed and edited as necessary the review of systems, past medical/surgical history, family history, social history, and examination findings as documented by others; and I examined the patient myself. I personally reviewed the relevant tests, images, and reports as documented above.     I formulated and edited as necessary the assessment and plan and discussed the findings and management plan with the patient and family.      Dianna Knowles MD  Comprehensive Ophthalmology & Ocular Pathology  Department of Ophthalmology and Visual Neurosciences  [email or web address]  Pager [phone]

## 2021-08-30 NOTE — PROGRESS NOTES
SUBJECTIVE:   CC: Nkechi Manrique is an 55 year old woman who presents for preventive health visit. Patient has been advised of split billing requirements and indicates understanding: Yes     Healthy Habits:     Getting at least 3 servings of Calcium per day:  Yes    Bi-annual eye exam:  Yes    Dental care twice a year:  Yes    Sleep apnea or symptoms of sleep apnea:  None    Diet:  Regular (no restrictions)    Frequency of exercise:  4-5 days/week    Duration of exercise:  45-60 minutes    Taking medications regularly:  No    Barriers to taking medications:  Not applicable    Medication side effects:  Not applicable    PHQ-2 Total Score: 0    Additional concerns today:  No    Arthrits of thumb.     History of melanoma in 2019 on L lower leg. [facility]     History of asthma, allergies. Had allergy testing last year. Will get a cough on occasion, when exposed to triggers. Isn't on a steroid inhaler.     ACT Total Scores 11/6/2015 6/12/2019 5/27/2021   ACT TOTAL SCORE - - -   ASTHMA ER VISITS - - -   ASTHMA HOSPITALIZATIONS - - -   ACT TOTAL SCORE (Goal Greater than or Equal to 20) 25 25 25   In the past 12 months, how many times did you visit the emergency room for your asthma without being admitted to the hospital? 0 0 0   In the past 12 months, how many times were you hospitalized overnight because of your asthma? 0 0 0     LMP age 49, no spotting or bleeding since then. She has had no symptoms of concern at this time.       Today's PHQ-2 Score:   PHQ-2 ( 1999 Pfizer) 8/30/2021   Q1: Little interest or pleasure in doing things 0   Q2: Feeling down, depressed or hopeless 0   PHQ-2 Score 0   Q1: Little interest or pleasure in doing things Not at all   Q2: Feeling down, depressed or hopeless Not at all   PHQ-2 Score 0     Abuse: Current or Past (Physical, Sexual or Emotional) - No  Do you feel safe in your environment? Yes    Have you ever done Advance Care Planning?  (For example, a Health Directive, POLST, or a discussion with a medical provider or your loved ones about your wishes): No, advance care planning information given to patient to review.  Patient plans to discuss their wishes with loved ones or provider.      Social History     Tobacco Use     Smoking status: Never Smoker     Smokeless tobacco: Never Used   Substance Use Topics     Alcohol use: Yes     Alcohol/week: 1.0 standard drinks     Comment: 2 drinks per week     Alcohol Use 8/30/2021   Prescreen: >3 drinks/day or >7 drinks/week? No   Prescreen: >3 drinks/day or >7 drinks/week? -     Reviewed orders with patient.  Reviewed health maintenance and updated orders accordingly - Yes  Lab work is in process    Breast Cancer Screening:  Any new diagnosis of family breast, ovarian, or bowel cancer? No    FHS-7: No flowsheet data found.    Mammogram Screening: Recommended annual mammography  Pertinent mammograms are reviewed under the imaging tab.    History of abnormal Pap smear: NO - age 30-65 PAP every 5 years with negative HPV co-testing recommended  PAP / HPV Latest Ref Rng & Units 10/31/2018 9/27/2013 3/16/2010   PAP (Historical) - NIL NIL NIL   HPV16 NEG:Negative Negative - -   HPV18 NEG:Negative Negative - -   HRHPV NEG:Negative Negative - -     Reviewed and updated as needed this visit by clinical staff  Tobacco  Allergies  Meds   Med Hx  Surg Hx  Fam Hx  Soc Hx        Reviewed and updated as needed this visit by Provider    Meds                 Review of Systems   Constitutional: Negative for chills and fever.   HENT: Negative for congestion, ear pain, hearing loss and sore throat.    Eyes: Negative for pain and visual disturbance.   Respiratory: Positive for cough. Negative for shortness of breath.    Cardiovascular: Negative for chest pain, palpitations and peripheral edema.   Gastrointestinal: Negative for abdominal pain, constipation, diarrhea, heartburn, hematochezia and nausea.   Genitourinary:  "Negative for dysuria, frequency, genital sores, hematuria and urgency.   Musculoskeletal: Positive for arthralgias. Negative for joint swelling and myalgias.   Skin: Negative for rash.   Neurological: Negative for dizziness, weakness, headaches and paresthesias.   Psychiatric/Behavioral: Negative for mood changes. The patient is not nervous/anxious.         OBJECTIVE:   /64 (BP Location: Right arm, Patient Position: Chair, Cuff Size: Adult Regular)   Pulse 69   Temp 97.5  F (36.4  C) (Tympanic)   Resp 14   Ht 1.727 m (5' 8\")   Wt 82.1 kg (181 lb 1.6 oz)   LMP 10/16/2015 (Exact Date)   SpO2 99%   BMI 27.54 kg/m    Physical Exam  GENERAL: healthy, alert and no distress  EYES: Eyes grossly normal to inspection, PERRL and conjunctivae and sclerae normal  HENT: ear canals and TM's normal, nose and mouth without ulcers or lesions  NECK: no adenopathy, no asymmetry, masses, or scars and thyroid normal to palpation  RESP: lungs clear to auscultation - no rales, rhonchi or wheezes  CV: regular rate and rhythm, normal S1 S2, no S3 or S4, no murmur, click or rub, no peripheral edema and peripheral pulses strong  MS: no gross musculoskeletal defects noted, no edema  SKIN: no suspicious lesions or rashes  PSYCH: mentation appears normal, affect normal/bright      ASSESSMENT/PLAN:   (Z00.00) Routine general medical examination at a health care facility  (primary encounter diagnosis)  Comment:   Plan: GLUCOSE, Lipid panel reflex to direct LDL         Fasting            (Z11.4) Screening for HIV (human immunodeficiency virus)  Comment:   Plan:     (Z13.220) Screening for hyperlipidemia  Comment:   Plan:     (D03.72) Melanoma in situ of left lower extremity including hip (H)  Comment: followed by derm for routine skin checks  Plan:     (J45.20) Mild intermittent asthma without complication  Comment: has been more intermittent symptoms.   Plan:     (M18.9) Osteoarthritis of carpometacarpal (CMC) joint of thumb, " "unspecified laterality, unspecified osteoarthritis type  Comment: discussed potential of ortho doing injections, woill consider if symptoms worsen  Plan:     (D68.51) Factor 5 Leiden mutation, heterozygous (H)  Comment:   Plan:     Patient has been advised of split billing requirements and indicates understanding: No  COUNSELING:  Reviewed preventive health counseling, as reflected in patient instructions  Special attention given to:        Regular exercise       Healthy diet/nutrition       Osteoporosis prevention/bone health       HIV screeninx in teen years, 1x in adult years, and at intervals if high risk       (Vandana)menopause management    Estimated body mass index is 27.54 kg/m  as calculated from the following:    Height as of this encounter: 1.727 m (5' 8\").    Weight as of this encounter: 82.1 kg (181 lb 1.6 oz).        She reports that she has never smoked. She has never used smokeless tobacco.    Counseling Resources:  ATP IV Guidelines  Pooled Cohorts Equation Calculator  Breast Cancer Risk Calculator  BRCA-Related Cancer Risk Assessment: FHS-7 Tool  FRAX Risk Assessment  ICSI Preventive Guidelines  Dietary Guidelines for Americans,   USDA's MyPlate  ASA Prophylaxis  Lung CA Screening    Tawny Colunga, SANDOR [facility] [place]  "

## 2021-09-13 ENCOUNTER — LAB (OUTPATIENT)
Dept: LAB | Facility: CLINIC | Age: 56
End: 2021-09-13
Payer: COMMERCIAL

## 2021-09-13 DIAGNOSIS — Z00.00 ROUTINE GENERAL MEDICAL EXAMINATION AT A HEALTH CARE FACILITY: ICD-10-CM

## 2021-09-13 PROCEDURE — 80061 LIPID PANEL: CPT

## 2021-09-13 PROCEDURE — 36415 COLL VENOUS BLD VENIPUNCTURE: CPT

## 2021-09-13 PROCEDURE — 82947 ASSAY GLUCOSE BLOOD QUANT: CPT

## 2021-09-14 LAB
CHOLEST SERPL-MCNC: 229 MG/DL
FASTING STATUS PATIENT QL REPORTED: YES
FASTING STATUS PATIENT QL REPORTED: YES
GLUCOSE BLD-MCNC: 89 MG/DL (ref 70–99)
HDLC SERPL-MCNC: 100 MG/DL
LDLC SERPL CALC-MCNC: 121 MG/DL
NONHDLC SERPL-MCNC: 129 MG/DL
TRIGL SERPL-MCNC: 41 MG/DL

## 2021-10-24 ENCOUNTER — HEALTH MAINTENANCE LETTER (OUTPATIENT)
Age: 56
End: 2021-10-24

## 2021-12-28 ENCOUNTER — E-VISIT (OUTPATIENT)
Dept: PEDIATRICS | Facility: CLINIC | Age: 56
End: 2021-12-28
Payer: COMMERCIAL

## 2021-12-28 ENCOUNTER — LAB (OUTPATIENT)
Dept: URGENT CARE | Facility: URGENT CARE | Age: 56
End: 2021-12-28
Attending: INTERNAL MEDICINE
Payer: COMMERCIAL

## 2021-12-28 DIAGNOSIS — Z20.822 SUSPECTED COVID-19 VIRUS INFECTION: ICD-10-CM

## 2021-12-28 DIAGNOSIS — Z20.822 SUSPECTED COVID-19 VIRUS INFECTION: Primary | ICD-10-CM

## 2021-12-28 PROCEDURE — U0003 INFECTIOUS AGENT DETECTION BY NUCLEIC ACID (DNA OR RNA); SEVERE ACUTE RESPIRATORY SYNDROME CORONAVIRUS 2 (SARS-COV-2) (CORONAVIRUS DISEASE [COVID-19]), AMPLIFIED PROBE TECHNIQUE, MAKING USE OF HIGH THROUGHPUT TECHNOLOGIES AS DESCRIBED BY CMS-2020-01-R: HCPCS

## 2021-12-28 PROCEDURE — 99421 OL DIG E/M SVC 5-10 MIN: CPT | Performed by: INTERNAL MEDICINE

## 2021-12-28 PROCEDURE — U0005 INFEC AGEN DETEC AMPLI PROBE: HCPCS

## 2021-12-28 NOTE — PATIENT INSTRUCTIONS
Nkechi,    Based on your responses, you may have coronavirus (COVID-19). This illness can cause fever, cough and trouble breathing. Many people get a mild case and get better on their own. Some people can get very sick.    Will I be tested for COVID-19?  We would like to test you for COVID-19 virus. I have placed orders for this test.     For all employees or close contacts (except [employer] and [employer] - see below), go to your [employer] home page and scroll down to the section that says  You have an appointment that needs to be scheduled  and click the large green button that says  Schedule Now  and follow the steps to find the next available opening.     If you are unable to complete these steps or if you cannot find any available times, please call [phone] to schedule employee testing.     [employer] employees or close contacts, please call [phone].   [employer]) employees or close contacts call [phone].    Return to work/school/ guidance:  Please let your workplace manager and staffing office know when your isolation ends.       If you receive a positive COVID-19 test result, follow the guidance of the those who are giving you the results. Usually the return to work is 10 days from symptom onset or positive test date, (or in some cases 20 days if you are immunocompromised). If your symptoms started after your positive test, the 10 days should start when your symptoms started.   o If you work at [employer], you must also be cleared by Employee Occupational Health and Safety to return to work.      If you receive a negative COVID-19 test result and did not have a high risk exposure to someone with a known positive COVID-19 test, you can return to work once you're free of fever for 24 hours without fever-reducing medication and your symptoms are improving or resolved.    If you receive a negative COVID-19 test and had a high-risk exposure to someone who has tested positive for  COVID-19 then you can return to work 14 days after your last contact with the positive individual. Follow quarantine guidance given by your doctor or public health officials.     Sign up for [employer].   We know it's scary to hear that you might have COVID-19. We want to track your symptoms to make sure you're okay over the next 2 weeks. Please look for an email from [employer]--this is a free, online program that we'll use to keep in touch. To sign up, follow the link in the email you will receive. Learn more at [email or web address]        How can I take care of myself?  Over the counter medications may help with your symptoms like congestion, cough, chills, or fever.     There are not many effective prescription treatments for early COVID-19. Hydroxychloroquine, ivermectin, and azithromycin are not effective or recommended for COVID-19.    If your symptoms started in the last 10 days, you may be able to receive a treatment with monoclonal antibodies. This treatment can lower your risk of severe illness and going to the hospital. It is given through an IV or under your skin (subcutaneous) and must be given at an infusion center. You must be 12 or older, weight at least 88 pounds, and have a positive COVID-19 test.    If you would like to sign up to be considered to receive the monoclonal antibody medicine, please complete a participation form through the [facility] here: MNRAP ([email or web address]). You may also call the [facility] COVID-19 Public Hotline at 1-970.237.3886 (open Mon-Fri: 9am-7pm and Sat: 10am-6pm).     Not all people who are eligible will receive the medicine, since supply is limited. You will be contacted in the next 1 to 2 business days only if you are selected. If you do not receive a call, you have not been selected to receive the medicine. If you have any questions about this medication, please contact your primary care  provider. For more information, see [email or web address]      Get lots of rest. Drink extra fluids (unless a doctor has told you not to)    Take Tylenol (acetaminophen) or ibuprofen for fever or pain. If you have liver or kidney problems, ask your family doctor if it's okay to take Tylenol o ibuprofen    Take over the counter medications for your symptoms, as directed by your doctor. You may also talk to your pharmacist.      If you have other health problems (like cancer, heart failure, an organ transplant or severe kidney disease): Call your specialty clinic if you don't feel better in the next 2 days.    Know when to call 911. Emergency warning signs include:  o Trouble breathing or shortness of breath  o Pain or pressure in the chest that doesn't go away  o Feeling confused like you haven't felt before, or not being able to wake up  o Bluish-colored lips or face    Where can I get more information?    [facility] - About COVID-19: [email or web address]     CDC - What to Do If You're Sick:     www.cdc.gov/coronavirus/2019-ncov/about/steps-when-sick.html    CDC - Ending Home Isolation:  https://www.cdc.gov/coronavirus/2019-ncov/your-health/quarantine-isolation.html     CDC - Caring for Someone:  www.cdc.gov/coronavirus/2019-ncov/if-you-are-sick/care-for-someone.html    [facility] clinical trials (COVID-19 research studies): clinicalaffairs.[facility]/n-clinical-trials    Below are the COVID-19 hotlines at the [facility] ([facility]). Interpreters are available.  o For health questions: Call 939-481-6809 or 1-978.889.5801 (7 a.m. to 7 p.m.)  o For questions about schools and childcare: Call 351-298-1567 or 1-418.897.3183 (7 a.m. to 7 p.m.)  December 28, 2021  RE:  Nkechi Manrique                                                                                                                  [address]  [id number]      To whom it may concern:    I evaluated Nkcehi Manrique on December 28, 2021. Nkechi Manrique should be excused from work/school.     They should let their workplace manager and staffing office know when their quarantine ends.    We can not give an exact date as it depends on the information below. They can calculate this on their own or work with their manager/staffing office to calculate this. (For example if they were exposed on 10/04, they would have to quarantine for 14 full days. That would be through 10/18. They could return on 10/19.)    Quarantine Guidelines:    If patient receives a positive COVID-19 test result, they should follow the guidance of those who are giving the results. Usually the return to work is 10 (or in some cases 20 days from symptom onset.) If they work at [employer], they must be cleared by Employee Occupational Health and Safety to return to work.      If patient receives a negative COVID-19 test result and did not have a high risk exposure to someone with a known positive COVID-19 test, they can return to work once they're free of fever for 24 hours without fever-reducing medication and their symptoms are improving or resolved.    If patient receives a negative COVID-19 test and if they had a high risk exposure to someone who has tested positive for COVID-19 then they can return to work 14 days after their last contact with the positive individual    Note: If there is ongoing exposure to the covid positive person, this quarantine period may be longer than 14 days. (For example, if they are continually exposed to their child, who tested positive and cannot isolate from them, then the quarantine of 7-14 days can't start until their child is no longer contagious. This is typically 10 days from onset to the child's symptoms. So the total duration may be 17-24 days in this case.)     Sincerely,  Medhat Hughes MD          o

## 2021-12-29 LAB — SARS-COV-2 RNA RESP QL NAA+PROBE: NEGATIVE

## 2022-02-18 ENCOUNTER — OFFICE VISIT (OUTPATIENT)
Dept: URGENT CARE | Facility: URGENT CARE | Age: 57
End: 2022-02-18
Payer: COMMERCIAL

## 2022-02-18 VITALS
HEART RATE: 78 BPM | DIASTOLIC BLOOD PRESSURE: 92 MMHG | SYSTOLIC BLOOD PRESSURE: 147 MMHG | TEMPERATURE: 98 F | RESPIRATION RATE: 20 BRPM | OXYGEN SATURATION: 98 %

## 2022-02-18 DIAGNOSIS — Z53.9 DIAGNOSIS NOT YET DEFINED: Primary | ICD-10-CM

## 2022-02-19 NOTE — PROGRESS NOTES
S: Patient cut her finger on an aluminum can approximately 2 hours ago. Area was bleeding. Tetanus 2016  O: Wound is not bleeding. Wound edges are approximated. Wound is superficial without evidence of foreign body, nerve or tendon damage.   A/P: Wound is very superficial, no longer bleeding and wound edges are approximated. Sutures are not indicated. No services provided.     Hoa Peck PA-C

## 2022-03-21 ENCOUNTER — ANCILLARY PROCEDURE (OUTPATIENT)
Dept: MAMMOGRAPHY | Facility: CLINIC | Age: 57
End: 2022-03-21
Attending: OBSTETRICS & GYNECOLOGY
Payer: COMMERCIAL

## 2022-03-21 DIAGNOSIS — Z78.0 MENOPAUSE: ICD-10-CM

## 2022-03-21 PROCEDURE — 77067 SCR MAMMO BI INCL CAD: CPT | Mod: GC | Performed by: RADIOLOGY

## 2022-03-21 PROCEDURE — 77063 BREAST TOMOSYNTHESIS BI: CPT | Mod: GC | Performed by: RADIOLOGY

## 2022-05-25 NOTE — MR AVS SNAPSHOT
After Visit Summary   4/20/2018    Nkechi Manrique    MRN: [id number]           Patient Information     Date Of Birth          1965        Visit Information        Provider Department      4/20/2018 11:00 AM Kellie Granados, PhD  [facility]         Today's Diagnoses     Anxiety disorder, unspecified type    -  1       Follow-ups after your visit        Your next 10 appointments already scheduled     Apr 25, 2018  3:00 PM CDT   Return Visit with Kellie Granados, PhD SHANNON   [facility]  ([facility])    [facility] [facility]  [address]  [address]  [facility] [address]   [phone]            Jun 01, 2018 11:00 AM CDT   Return Visit with Kellie Granados, PhD    [facility]  ([facility])    [place] [facility]  [address]  [address]  [facility] [address]   [phone]            Jun 20, 2018  9:00 AM CDT   Return Visit with Kellie Granados, PhD    [facility]  ([facility])    [place] [facility]  [address]  [address]  [facility] [address]   [phone]            Jul 11, 2018 10:00 AM CDT   Return Visit with Kellie Granados, PhD    [facility]  ([facility])    [place] [facility]  [address]  [address]  [facility] [address]   [phone]            Jul 25, 2018 10:00 AM CDT   Return Visit with Kellie Granados, PhD    [facility]  ([facility])    [place] [facility]  [address]  [address]  [facility] [address]   [phone]            Aug 15, 2018  2:00 PM CDT   Return Visit with Kellie Granados, PhD    [facility]  ([facility])    [place] [facility]  [address]  [address]  [facility] [address]   [phone]          Conjuntivae and eyelids appear normal, Sclerae : White without injection    Aug 27, 2018  9:00 AM CDT   Return Visit with Kellie Granados, PhD    [facility]  ([facility])    [address], [facility] [address]  [address] [place] [address] [phone]              Who to contact     Please call your clinic at [phone] to:    Ask questions about your health    Make or cancel appointments    Discuss your medicines    Learn about your test results    Speak to your doctor            Additional Information About Your Visit        Gift PinpointharTalkito Information     [employer] gives you secure access to your electronic health record. If you see a primary care provider, you can also send messages to your care team and make appointments. If you have questions, please call your primary care clinic.  If you do not have a primary care provider, please call [phone] and they will assist you.      [employer] is an electronic gateway that provides easy, online access to your medical records. With [employer], you can request a clinic appointment, read your test results, renew a prescription or communicate with your care team.     To access your existing account, please contact your [facility] or call [phone] for assistance.        Care EveryWhere ID     This is your Care EveryWhere ID. This could be used by other organizations to access your [place] medical records  [id number]        Your Vitals Were     Last Period                   10/16/2015 (Exact Date)            Blood Pressure from Last 3 Encounters:   08/11/17 121/77   06/28/17 110/72   11/06/15 117/68    Weight from Last 3 Encounters:   06/28/17 89 kg (196 lb 4.8 oz)   11/02/15 79.4 kg (175 lb)   10/28/14 79.4 kg (175 lb)              We Performed the Following     Individual Psychotherapy - Psychotherapy with pt and/or family present 45 mins [38-52 mins] (46532)        Primary Care Provider Office Phone # Fax #    Dinorah Alonso [phone]  [phone]       [address] [facility]  [address]  [facility] [address]        Equal Access to Services     LEVIKTOR JOANN : Hadii aad ku misha Copeland, waluzda luqadaha, qaybta kaalmada faviola, key sanchez laMirzamamadou hinson. So [facility] [phone].    ATENCIÓN: Si habla español, tiene a phelps disposición servicios gratuitos de asistencia lingüística. Llame al [phone].    We comply with applicable federal civil rights laws and [place] laws. We do not discriminate on the basis of race, color, national origin, age, disability, sex, sexual orientation, or gender identity.            Thank you!     Thank you for choosing [facility]   for your care. Our goal is always to provide you with excellent care. Hearing back from our patients is one way we can continue to improve our services. Please take a few minutes to complete the written survey that you may receive in the mail after your visit with us. Thank you!             Your Updated Medication List - Protect others around you: Learn how to safely use, store and throw away your medicines at www.disposemymeds.org.          This list is accurate as of 4/20/18  4:52 PM.  Always use your most recent med list.                   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       * estradiol 0.1 MG/GM cream    ESTRACE VAGINAL    42.5 g    Place 2 g vaginally once a week    Menopause       * estradiol 0.1 MG/GM cream    ESTRACE VAGINAL    42.5 g     Place 2 g vaginally once a week    Menopause       fluticasone 50 MCG/ACT spray    FLONASE     Spray 2 sprays into both nostrils daily        ketoprofen 10% in PLO 10% topical gel     30 g    Apply a pea-sized amount to the CMC joint on the Right    CMC arthritis       magnesium 100 MG Caps           progesterone 100 MG    ENDOMETRIN    90 each    Take 100mg progesterone by mouth at bedtime    Menopause       triamcinolone 0.1 % cream    KENALOG    80 g    Apply sparingly to affected area three times daily as needed for 1-2 weeks    Eczema, unspecified type       vitamin B complex with vitamin C Tabs tablet      Take 1 tablet by mouth daily        * Notice:  This list has 2 medication(s) that are the same as other medications prescribed for you. Read the directions carefully, and ask your doctor or other care provider to review them with you.

## 2022-06-01 ENCOUNTER — TRANSFERRED RECORDS (OUTPATIENT)
Dept: HEALTH INFORMATION MANAGEMENT | Facility: CLINIC | Age: 57
End: 2022-06-01
Payer: COMMERCIAL

## 2022-06-02 ENCOUNTER — OFFICE VISIT (OUTPATIENT)
Dept: PEDIATRICS | Facility: CLINIC | Age: 57
End: 2022-06-02
Payer: COMMERCIAL

## 2022-06-02 VITALS
BODY MASS INDEX: 30.96 KG/M2 | SYSTOLIC BLOOD PRESSURE: 104 MMHG | HEART RATE: 73 BPM | RESPIRATION RATE: 18 BRPM | TEMPERATURE: 97.7 F | OXYGEN SATURATION: 96 % | DIASTOLIC BLOOD PRESSURE: 70 MMHG | HEIGHT: 68 IN | WEIGHT: 204.3 LBS

## 2022-06-02 DIAGNOSIS — Z01.818 PREOP GENERAL PHYSICAL EXAM: Primary | ICD-10-CM

## 2022-06-02 DIAGNOSIS — J45.30 MILD PERSISTENT ASTHMA WITHOUT COMPLICATION: ICD-10-CM

## 2022-06-02 DIAGNOSIS — D68.51 FACTOR 5 LEIDEN MUTATION, HETEROZYGOUS (H): ICD-10-CM

## 2022-06-02 DIAGNOSIS — S52.532K CLOSED COLLES' FRACTURE OF LEFT RADIUS WITH NONUNION, SUBSEQUENT ENCOUNTER: ICD-10-CM

## 2022-06-02 PROCEDURE — 99214 OFFICE O/P EST MOD 30 MIN: CPT | Performed by: INTERNAL MEDICINE

## 2022-06-02 RX ORDER — HYDROCODONE BITARTRATE AND ACETAMINOPHEN 5; 325 MG/1; MG/1
TABLET ORAL
COMMUNITY
Start: 2022-05-29 | End: 2022-10-11

## 2022-06-02 RX ORDER — HYDROCODONE BITARTRATE AND ACETAMINOPHEN 5; 325 MG/1; MG/1
1 TABLET ORAL
COMMUNITY
Start: 2022-05-29 | End: 2022-10-11

## 2022-06-02 RX ORDER — ONDANSETRON 4 MG/1
TABLET, FILM COATED ORAL
COMMUNITY
Start: 2022-06-01 | End: 2022-10-11

## 2022-06-02 RX ORDER — FEXOFENADINE HCL 60 MG/1
TABLET, FILM COATED ORAL
COMMUNITY
Start: 2020-10-30

## 2022-06-02 RX ORDER — FLUTICASONE PROPIONATE 50 MCG
2 SPRAY, SUSPENSION (ML) NASAL
COMMUNITY

## 2022-06-02 SDOH — HEALTH STABILITY: PHYSICAL HEALTH: ON AVERAGE, HOW MANY MINUTES DO YOU ENGAGE IN EXERCISE AT THIS LEVEL?: 150+ MIN

## 2022-06-02 SDOH — ECONOMIC STABILITY: INCOME INSECURITY: HOW HARD IS IT FOR YOU TO PAY FOR THE VERY BASICS LIKE FOOD, HOUSING, MEDICAL CARE, AND HEATING?: NOT VERY HARD

## 2022-06-02 SDOH — ECONOMIC STABILITY: INCOME INSECURITY: IN THE LAST 12 MONTHS, WAS THERE A TIME WHEN YOU WERE NOT ABLE TO PAY THE MORTGAGE OR RENT ON TIME?: NO

## 2022-06-02 SDOH — ECONOMIC STABILITY: FOOD INSECURITY: WITHIN THE PAST 12 MONTHS, THE FOOD YOU BOUGHT JUST DIDN'T LAST AND YOU DIDN'T HAVE MONEY TO GET MORE.: NEVER TRUE

## 2022-06-02 SDOH — ECONOMIC STABILITY: TRANSPORTATION INSECURITY
IN THE PAST 12 MONTHS, HAS LACK OF TRANSPORTATION KEPT YOU FROM MEETINGS, WORK, OR FROM GETTING THINGS NEEDED FOR DAILY LIVING?: NO

## 2022-06-02 SDOH — HEALTH STABILITY: PHYSICAL HEALTH: ON AVERAGE, HOW MANY DAYS PER WEEK DO YOU ENGAGE IN MODERATE TO STRENUOUS EXERCISE (LIKE A BRISK WALK)?: 6 DAYS

## 2022-06-02 SDOH — ECONOMIC STABILITY: TRANSPORTATION INSECURITY
IN THE PAST 12 MONTHS, HAS THE LACK OF TRANSPORTATION KEPT YOU FROM MEDICAL APPOINTMENTS OR FROM GETTING MEDICATIONS?: NO

## 2022-06-02 SDOH — ECONOMIC STABILITY: FOOD INSECURITY: WITHIN THE PAST 12 MONTHS, YOU WORRIED THAT YOUR FOOD WOULD RUN OUT BEFORE YOU GOT MONEY TO BUY MORE.: NEVER TRUE

## 2022-06-02 ASSESSMENT — SOCIAL DETERMINANTS OF HEALTH (SDOH)
DO YOU BELONG TO ANY CLUBS OR ORGANIZATIONS SUCH AS CHURCH GROUPS UNIONS, FRATERNAL OR ATHLETIC GROUPS, OR SCHOOL GROUPS?: YES
HOW OFTEN DO YOU ATTEND CHURCH OR RELIGIOUS SERVICES?: 1 TO 4 TIMES PER YEAR
IN A TYPICAL WEEK, HOW MANY TIMES DO YOU TALK ON THE PHONE WITH FAMILY, FRIENDS, OR NEIGHBORS?: MORE THAN THREE TIMES A WEEK
HOW OFTEN DO YOU GET TOGETHER WITH FRIENDS OR RELATIVES?: MORE THAN THREE TIMES A WEEK

## 2022-06-02 ASSESSMENT — ASTHMA QUESTIONNAIRES: ACT_TOTALSCORE: 25

## 2022-06-02 ASSESSMENT — PAIN SCALES - GENERAL: PAINLEVEL: MODERATE PAIN (4)

## 2022-06-02 ASSESSMENT — LIFESTYLE VARIABLES
HOW OFTEN DO YOU HAVE A DRINK CONTAINING ALCOHOL: 2-4 TIMES A MONTH
AUDIT-C TOTAL SCORE: 2
SKIP TO QUESTIONS 9-10: 1
HOW OFTEN DO YOU HAVE SIX OR MORE DRINKS ON ONE OCCASION: NEVER
HOW MANY STANDARD DRINKS CONTAINING ALCOHOL DO YOU HAVE ON A TYPICAL DAY: 1 OR 2

## 2022-06-02 NOTE — PATIENT INSTRUCTIONS
Preparing for Your Surgery  Getting started  A nurse will call you to review your health history and instructions. They will give you an arrival time based on your scheduled surgery time. Please be ready to share:    Your doctor's clinic name and phone number    Your medical, surgical and anesthesia history    A list of allergies and sensitivities    A list of medicines, including herbal treatments and over-the-counter drugs    Whether the patient has a legal guardian (ask how to send us the papers in advance)  Please tell us if you're pregnant--or if there's any chance you might be pregnant. Some surgeries may injure a fetus (unborn baby), so they require a pregnancy test. Surgeries that are safe for a fetus don't always need a test, and you can choose whether to have one.   If you have a child who's having surgery, please ask for a copy of Preparing for Your Child's Surgery.    Preparing for surgery    Within 30 days of surgery: Have a pre-op exam (sometimes called an H&P, or History and Physical). This can be done at a clinic or pre-operative center.  ? If you're having a , you may not need this exam. Talk to your care team.    At your pre-op exam, talk to your care team about all medicines you take. If you need to stop any medicines before surgery, ask when to start taking them again.  ? We do this for your safety. Many medicines can make you bleed too much during surgery. Some change how well surgery (anesthesia) drugs work.    Call your insurance company to let them know you're having surgery. (If you don't have insurance, call [phone].)    Call your clinic if there's any change in your health. This includes signs of a cold or flu (sore throat, runny nose, cough, rash, fever). It also includes a scrape or scratch near the surgery site.    If you have questions on the day of surgery, call your hospital or surgery center.  COVID testing  You may need to be tested for COVID-19 before having  surgery. If so, we will give you instructions.  Eating and drinking guidelines  For your safety: Unless your surgeon tells you otherwise, follow the guidelines below.    Eat and drink as usual until 8 hours before surgery. After that, no food or milk.    Drink clear liquids until 2 hours before surgery. These are liquids you can see through, like water, Gatorade and Propel Water. You may also have black coffee and tea (no cream or milk).    Nothing by mouth within 2 hours of surgery. This includes gum, candy and breath mints.    If you drink alcohol: Stop drinking it the night before surgery.    If your care team tells you to take medicine on the morning of surgery, it's okay to take it with a sip of water.  Preventing infection    Shower or bathe the night before and morning of your surgery. Follow the instructions your clinic gave you. (If no instructions, use regular soap.)    Don't shave or clip hair near your surgery site. We'll remove the hair if needed.    Don't smoke or vape the morning of surgery. You may chew nicotine gum up to 2 hours before surgery. A nicotine patch is okay.  ? Note: Some surgeries require you to completely quit smoking and nicotine. Check with your surgeon.    Your care team will make every effort to keep you safe from infection. We will:  ? Clean our hands often with soap and water (or an alcohol-based hand rub).  ? Clean the skin at your surgery site with a special soap that kills germs.  ? Give you a special gown to keep you warm. (Cold raises the risk of infection.)  ? Wear special hair covers, masks, gowns and gloves during surgery.  ? Give antibiotic medicine, if prescribed. Not all surgeries need antibiotics.  What to bring on the day of surgery    Photo ID and insurance card    Copy of your health care directive, if you have one    Glasses and hearing aides (bring cases)  ? You can't wear contacts during surgery    Inhaler and eye drops, if you use them (tell us about these when  you arrive)    CPAP machine or breathing device, if you use them    A few personal items, if spending the night    If you have . . .  ? A pacemaker, ICD (cardiac defibrillator) or other implant: Bring the ID card.  ? An implanted stimulator: Bring the remote control.  ? A legal guardian: Bring a copy of the certified (court-stamped) guardianship papers.  Please remove any jewelry, including body piercings. Leave jewelry and other valuables at home.  If you're going home the day of surgery    You must have a responsible adult drive you home. They should stay with you overnight as well.    If you don't have someone to stay with you, and you aren't safe to go home alone, we may keep you overnight. Insurance often won't pay for this.  After surgery  If it's hard to control your pain or you need more pain medicine, please call your surgeon's office.  Questions?   If you have any questions for your care team, list them here: _________________________________________________________________________________________________________________________________________________________________________ ____________________________________ ____________________________________ ____________________________________  For informational purposes only. Not to replace the advice of your health care provider. Copyright   2003, 2019 [facility]. All rights reserved. Clinically reviewed by Alison Kay MD. [employer]-8 [id number] - REV 07/21.    Preparing for Your Surgery  Getting started  A nurse will call you to review your health history and instructions. They will give you an arrival time based on your scheduled surgery time. Please be ready to share:    Your doctor's clinic name and phone number    Your medical, surgical and anesthesia history    A list of allergies and sensitivities    A list of medicines, including herbal treatments and over-the-counter drugs    Whether the patient has a legal guardian (ask how to send us the  papers in advance)  Please tell us if you're pregnant--or if there's any chance you might be pregnant. Some surgeries may injure a fetus (unborn baby), so they require a pregnancy test. Surgeries that are safe for a fetus don't always need a test, and you can choose whether to have one.   If you have a child who's having surgery, please ask for a copy of Preparing for Your Child's Surgery.    Preparing for surgery    Within 30 days of surgery: Have a pre-op exam (sometimes called an H&P, or History and Physical). This can be done at a clinic or pre-operative center.  ? If you're having a , you may not need this exam. Talk to your care team.    At your pre-op exam, talk to your care team about all medicines you take. If you need to stop any medicines before surgery, ask when to start taking them again.  ? We do this for your safety. Many medicines can make you bleed too much during surgery. Some change how well surgery (anesthesia) drugs work.    Call your insurance company to let them know you're having surgery. (If you don't have insurance, call [phone].)    Call your clinic if there's any change in your health. This includes signs of a cold or flu (sore throat, runny nose, cough, rash, fever). It also includes a scrape or scratch near the surgery site.    If you have questions on the day of surgery, call your hospital or surgery center.  COVID testing  You may need to be tested for COVID-19 before having surgery. If so, we will give you instructions.  Eating and drinking guidelines  For your safety: Unless your surgeon tells you otherwise, follow the guidelines below.    Eat and drink as usual until 8 hours before surgery. After that, no food or milk.    Drink clear liquids until 2 hours before surgery. These are liquids you can see through, like water, Gatorade and Propel Water. You may also have black coffee and tea (no cream or milk).    Nothing by mouth within 2 hours of surgery. This includes  gum, candy and breath mints.    If you drink alcohol: Stop drinking it the night before surgery.    If your care team tells you to take medicine on the morning of surgery, it's okay to take it with a sip of water.  Preventing infection    Shower or bathe the night before and morning of your surgery. Follow the instructions your clinic gave you. (If no instructions, use regular soap.)    Don't shave or clip hair near your surgery site. We'll remove the hair if needed.    Don't smoke or vape the morning of surgery. You may chew nicotine gum up to 2 hours before surgery. A nicotine patch is okay.  ? Note: Some surgeries require you to completely quit smoking and nicotine. Check with your surgeon.    Your care team will make every effort to keep you safe from infection. We will:  ? Clean our hands often with soap and water (or an alcohol-based hand rub).  ? Clean the skin at your surgery site with a special soap that kills germs.  ? Give you a special gown to keep you warm. (Cold raises the risk of infection.)  ? Wear special hair covers, masks, gowns and gloves during surgery.  ? Give antibiotic medicine, if prescribed. Not all surgeries need antibiotics.  What to bring on the day of surgery    Photo ID and insurance card    Copy of your health care directive, if you have one    Glasses and hearing aides (bring cases)  ? You can't wear contacts during surgery    Inhaler and eye drops, if you use them (tell us about these when you arrive)    CPAP machine or breathing device, if you use them    A few personal items, if spending the night    If you have . . .  ? A pacemaker, ICD (cardiac defibrillator) or other implant: Bring the ID card.  ? An implanted stimulator: Bring the remote control.  ? A legal guardian: Bring a copy of the certified (court-stamped) guardianship papers.  Please remove any jewelry, including body piercings. Leave jewelry and other valuables at home.  If you're going home the day of surgery    You  must have a responsible adult drive you home. They should stay with you overnight as well.    If you don't have someone to stay with you, and you aren't safe to go home alone, we may keep you overnight. Insurance often won't pay for this.  After surgery  If it's hard to control your pain or you need more pain medicine, please call your surgeon's office.  Questions?   If you have any questions for your care team, list them here: _________________________________________________________________________________________________________________________________________________________________________ ____________________________________ ____________________________________ ____________________________________  For informational purposes only. Not to replace the advice of your health care provider. Copyright   2003, 2019 [employer]. All rights reserved. Clinically reviewed by Alison Kay MD. [employer] [id number] - REV 07/21.

## 2022-06-02 NOTE — PROGRESS NOTES
[facility]  [address]  [address]  [place] [address]  Phone: [phone]  Fax: [phone]  Primary Provider: Tawny Woodson  Pre-op Performing Provider: KERVIN BRITO      PREOPERATIVE EVALUATION:  Today's date: 6/2/2022    Nkechi Manrique is a 56 year old female who presents for a preoperative evaluation.    Surgical Information:  Surgery/Procedure: Left wrist repair   Surgery Location: [facility]  Surgeon: Nevin Kate MD   Surgery Date: 6/3/22  Time of Surgery: 7:15am  Where patient plans to recover: At home with family  Fax number for surgical facility: [phone]    Phone:  [phone]    Type of Anesthesia Anticipated: General, nerve Block and monitored anesthesia care    Assessment & Plan     The proposed surgical procedure is considered INTERMEDIATE risk.    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      Subjective     HPI related to upcoming procedure: Nkechi Manrique is a 56 year old who presents for preoperative evaluation as requested by Dr Kate prior to ORIF left radius fracture.      Preop Questions 6/2/2022   1. Have you ever had a heart attack or stroke? No   2. Have you ever had surgery on your heart or blood vessels, such as a stent placement, a coronary artery bypass, or surgery on an  artery in your head, neck, heart, or legs? No   3. Do you have chest pain with activity? No   4. Do you have a history of  heart failure? No   5. Do you currently have a cold, bronchitis or symptoms of other infection? No   6. Do you have a cough, shortness of breath, or wheezing? No   7. Do you or anyone in your family have previous history of blood clots? YES -    8. Do you or does anyone in your family have a serious bleeding problem such as prolonged bleeding following surgeries or cuts? UNKNOWN -    9. Have you ever had problems with anemia or been told to take iron pills? No   10. Have you had any abnormal blood loss such as black, tarry or bloody stools, or abnormal vaginal bleeding? No   11. Have you ever had a blood transfusion? No   12. Are you willing to have a blood transfusion if it is medically needed before, during, or after your surgery? Yes   13. Have you or any of your relatives ever had problems with anesthesia? No prior problems with anesthesia   14. Do you have sleep apnea, excessive snoring or daytime drowsiness? No   15. Do you have any artifical heart valves or other implanted medical devices like a pacemaker, defibrillator, or continuous glucose monitor? No   16. Do you have artificial joints? No   17. Are you allergic to latex? No   18. Is there any chance that you may be pregnant? No       Preoperative Review of :  reviewed      Review of Systems  Constitutional, neuro, ENT, endocrine, pulmonary, cardiac, gastrointestinal, genitourinary, musculoskeletal, integument and psychiatric systems are negative, except as otherwise noted.    Patient Active Problem List    Diagnosis Date Noted     Nuclear sclerotic cataract of both eyes 08/04/2020     Priority: Medium     Added automatically from request for surgery [id number]       Melanoma in situ of left lower extremity including hip (H) 09/09/2019     Priority: Medium     Mechanical limb problems 12/23/2015     Priority: Medium     Seasonal  allergic rhinitis 11/06/2015     Priority: Medium     flonase and claritin- very helpful.  Nasal congestion, post-nasal drip, coughing, sore throat.       CMC arthritis, thumb, degenerative 11/03/2015     Priority: Medium     Breast lump 09/27/2013     Priority: Medium     Cyst on R breast- looked benign on 7/12 and 1/13 studies.  Breast tenderness improved after 1/13 study.       Varicose veins of legs 05/25/2012     Priority: Medium     Chuckie hose really helped- less swelling and discomfort at end of days, veins improved as well.       CARDIOVASCULAR SCREENING; LDL GOAL LESS THAN 160 10/31/2010     Priority: Medium     Mild persistent asthma      Priority: Medium     flovent daily helps (started in '12).  Triggers- allergies in Spring and URI's (really helps the prolonged coughing) but year-round sx's, so takes daily, rare albuterol       Factor 5 Leiden mutation, heterozygous (H)      Priority: Medium     Factor V Leiden positive  Has had superficial phlebitis        Past Medical History:   Diagnosis Date     Abnormal Pap smear 1990    Biopsy- ok     Anxiety      Blood dyscrasia     Factor 5 Leiden HTZ     Breast lump 9/27/2013     CMC arthritis, thumb, degenerative 11/3/2015     Congenital deficiency of other clotting factors     Factor V Leiden positive, had superficial thrombus, no DVT     Mild intermittent asthma with exacerbation     seasonally related, rare albuterol     Nonsenile cataract      Plantar fascia syndrome     L ft in '10, R ft in '11     Seasonal allergic rhinitis     claritin spring/August     Stress fracture of lower leg ~2002, 2009     Varicosities 2015    Varicose veins removed     Past Surgical History:   Procedure Laterality Date     BIOPSY  1998 2000 2017    Skin tags moles cysts     COLONOSCOPY  2015    Polyp removed     MOHS MICROGRAPHIC PROCEDURE       PHACOEMULSIFICATION CLEAR CORNEA WITH TORIC INTRAOCULAR LENS IMPLANT Left 8/28/2020    Procedure: LEFT CATARACT REMOVAL WITH INTRAOCULAR  "TORIC LENS IMPLANT;  Surgeon: Dianna Knowles MD;  Location: [facility] OR     PHLEBECTOMY MULTIPLE STAB  10/15    [place] [facility], Dr. Ramirez     Current Outpatient Medications   Medication Sig Dispense Refill     albuterol (PROAIR HFA/PROVENTIL HFA/VENTOLIN HFA) 108 (90 Base) MCG/ACT inhaler Inhale 2 puffs into the lungs every 6 hours 18 g 0     Elastic Bandages & Supports (MEDICAL COMPRESSION SOCKS) MISC 1 Package daily Please measure and distribute 1 pair of 20mmHg - 30mmHg THIGH high open or closed toe compression stockings. Jobst ultrasheer or equivalent. 2 each 3     EPINEPHrine (ANY BX GENERIC EQUIV) 0.3 MG/0.3ML injection 2-pack Inject 0.3 mLs (0.3 mg) into the muscle once as needed for anaphylaxis 0.3 mL 3     fexofenadine (ALLEGRA) 180 MG tablet Take 180 mg by mouth every morning        fexofenadine (ALLEGRA) 60 MG tablet        fluticasone (FLONASE) 50 MCG/ACT nasal spray Spray 2 sprays in nostril       fluticasone (FLONASE) 50 MCG/ACT nasal spray Spray 2 sprays into both nostrils every evening        HYDROcodone-acetaminophen (NORCO) 5-325 MG tablet        HYDROcodone-acetaminophen (NORCO) 5-325 MG tablet Take 1 tablet by mouth       Hypromellose (GENTEAL MILD TO MODERATE OP)        ondansetron (ZOFRAN) 4 MG tablet          Allergies   Allergen Reactions     Septra [Bactrim] Hives     Wasps [Hornets] Swelling     Significant leg swelling- epi pen rec in case worsening sx's        Social History     Tobacco Use     Smoking status: Never Smoker     Smokeless tobacco: Never Used   Substance Use Topics     Alcohol use: Yes     Alcohol/week: 1.0 standard drink     Comment: 2 drinks per week       History   Drug Use No         Objective     /70   Pulse 73   Temp 97.7  F (36.5  C) (Tympanic)   Resp 18   Ht 1.727 m (5' 8\")   Wt 92.7 kg (204 lb 4.8 oz)   LMP 10/16/2015 (Exact Date)   SpO2 96%   BMI 31.06 kg/m      Physical Exam     GENERAL APPEARANCE: healthy, alert and no distress     EYES: EOMI, PERRL     NECK: " no adenopathy, no asymmetry, masses, or scars and thyroid normal to palpation     RESP: lungs clear to auscultation - no rales, rhonchi or wheezes     CV: regular rates and rhythm, normal S1 S2, no S3 or S4 and no murmur, click or rub     ABDOMEN:  soft, nontender, no HSM or masses and bowel sounds normal     MS: left upper extrem cast     PSYCH: mentation appears normal. and affect normal/bright     LYMPHATICS: No cervical adenopathy    Diagnostics:  No labs were ordered during this visit.   No EKG required, no history of coronary heart disease, significant arrhythmia, peripheral arterial disease or other structural heart disease.    Revised Cardiac Risk Index (RCRI):  The patient has the following serious cardiovascular risks for perioperative complications:   - No serious cardiac risks = 0 points     RCRI Interpretation: 0 points: Class I (very low risk - 0.4% complication rate)           Signed Electronically by: Fernando Reyes MD  Copy of this evaluation report is provided to requesting physician.

## 2022-07-29 ENCOUNTER — TRANSFERRED RECORDS (OUTPATIENT)
Dept: PEDIATRICS | Facility: CLINIC | Age: 57
End: 2022-07-29

## 2022-08-23 ENCOUNTER — TRANSFERRED RECORDS (OUTPATIENT)
Dept: HEALTH INFORMATION MANAGEMENT | Facility: CLINIC | Age: 57
End: 2022-08-23

## 2022-09-27 ENCOUNTER — TRANSFERRED RECORDS (OUTPATIENT)
Dept: HEALTH INFORMATION MANAGEMENT | Facility: CLINIC | Age: 57
End: 2022-09-27

## 2022-10-11 ENCOUNTER — OFFICE VISIT (OUTPATIENT)
Dept: PEDIATRICS | Facility: CLINIC | Age: 57
End: 2022-10-11
Payer: COMMERCIAL

## 2022-10-11 VITALS
WEIGHT: 207.1 LBS | OXYGEN SATURATION: 97 % | DIASTOLIC BLOOD PRESSURE: 66 MMHG | SYSTOLIC BLOOD PRESSURE: 106 MMHG | BODY MASS INDEX: 32.51 KG/M2 | RESPIRATION RATE: 16 BRPM | TEMPERATURE: 98.3 F | HEIGHT: 67 IN | HEART RATE: 74 BPM

## 2022-10-11 DIAGNOSIS — J45.20 MILD INTERMITTENT ASTHMA WITHOUT COMPLICATION: ICD-10-CM

## 2022-10-11 DIAGNOSIS — Z12.11 SCREEN FOR COLON CANCER: ICD-10-CM

## 2022-10-11 DIAGNOSIS — D68.51 FACTOR 5 LEIDEN MUTATION, HETEROZYGOUS (H): ICD-10-CM

## 2022-10-11 DIAGNOSIS — Z87.81 HISTORY OF WRIST FRACTURE: ICD-10-CM

## 2022-10-11 DIAGNOSIS — Z00.00 ENCOUNTER FOR PREVENTIVE HEALTH EXAMINATION: Primary | ICD-10-CM

## 2022-10-11 LAB
BASOPHILS # BLD AUTO: 0 10E3/UL (ref 0–0.2)
BASOPHILS NFR BLD AUTO: 0 %
CHOLEST SERPL-MCNC: 245 MG/DL
EOSINOPHIL # BLD AUTO: 0.1 10E3/UL (ref 0–0.7)
EOSINOPHIL NFR BLD AUTO: 2 %
ERYTHROCYTE [DISTWIDTH] IN BLOOD BY AUTOMATED COUNT: 12.9 % (ref 10–15)
FASTING STATUS PATIENT QL REPORTED: YES
FASTING STATUS PATIENT QL REPORTED: YES
GLUCOSE BLD-MCNC: 106 MG/DL (ref 70–99)
HCT VFR BLD AUTO: 41.2 % (ref 35–47)
HDLC SERPL-MCNC: 94 MG/DL
HGB BLD-MCNC: 13.9 G/DL (ref 11.7–15.7)
LDLC SERPL CALC-MCNC: 134 MG/DL
LYMPHOCYTES # BLD AUTO: 2.1 10E3/UL (ref 0.8–5.3)
LYMPHOCYTES NFR BLD AUTO: 33 %
MCH RBC QN AUTO: 30.8 PG (ref 26.5–33)
MCHC RBC AUTO-ENTMCNC: 33.7 G/DL (ref 31.5–36.5)
MCV RBC AUTO: 91 FL (ref 78–100)
MONOCYTES # BLD AUTO: 0.8 10E3/UL (ref 0–1.3)
MONOCYTES NFR BLD AUTO: 12 %
NEUTROPHILS # BLD AUTO: 3.3 10E3/UL (ref 1.6–8.3)
NEUTROPHILS NFR BLD AUTO: 53 %
NONHDLC SERPL-MCNC: 151 MG/DL
PLATELET # BLD AUTO: 201 10E3/UL (ref 150–450)
RBC # BLD AUTO: 4.52 10E6/UL (ref 3.8–5.2)
TRIGL SERPL-MCNC: 86 MG/DL
TSH SERPL DL<=0.005 MIU/L-ACNC: 2.27 MU/L (ref 0.4–4)
WBC # BLD AUTO: 6.3 10E3/UL (ref 4–11)

## 2022-10-11 PROCEDURE — 36415 COLL VENOUS BLD VENIPUNCTURE: CPT | Performed by: NURSE PRACTITIONER

## 2022-10-11 PROCEDURE — 99396 PREV VISIT EST AGE 40-64: CPT | Performed by: NURSE PRACTITIONER

## 2022-10-11 PROCEDURE — 84443 ASSAY THYROID STIM HORMONE: CPT | Performed by: NURSE PRACTITIONER

## 2022-10-11 PROCEDURE — 85025 COMPLETE CBC W/AUTO DIFF WBC: CPT | Performed by: NURSE PRACTITIONER

## 2022-10-11 PROCEDURE — 80061 LIPID PANEL: CPT | Performed by: NURSE PRACTITIONER

## 2022-10-11 PROCEDURE — 82947 ASSAY GLUCOSE BLOOD QUANT: CPT | Performed by: NURSE PRACTITIONER

## 2022-10-11 ASSESSMENT — ENCOUNTER SYMPTOMS
DIZZINESS: 0
FEVER: 0
NAUSEA: 0
WEAKNESS: 0
MYALGIAS: 1
SHORTNESS OF BREATH: 0
JOINT SWELLING: 0
SORE THROAT: 0
DIARRHEA: 0
HEADACHES: 0
CHILLS: 0
PARESTHESIAS: 0
FREQUENCY: 0
NERVOUS/ANXIOUS: 0
DYSURIA: 0
ARTHRALGIAS: 1
ABDOMINAL PAIN: 0
HEMATURIA: 0
PALPITATIONS: 0
EYE PAIN: 0
HEARTBURN: 1
COUGH: 1
HEMATOCHEZIA: 0
CONSTIPATION: 0

## 2022-10-11 ASSESSMENT — ASTHMA QUESTIONNAIRES: ACT_TOTALSCORE: 25

## 2022-10-11 ASSESSMENT — PAIN SCALES - GENERAL: PAINLEVEL: NO PAIN (0)

## 2022-10-11 NOTE — PROGRESS NOTES
SUBJECTIVE:   CC: Nkechi is an 56 year old who presents for preventive health visit.       Patient has been advised of split billing requirements and indicates understanding: Yes  Healthy Habits:     Getting at least 3 servings of Calcium per day:  Yes    Bi-annual eye exam:  Yes    Dental care twice a year:  Yes    Sleep apnea or symptoms of sleep apnea:  None    Diet:  Regular (no restrictions)    Frequency of exercise:  6-7 days/week    Duration of exercise:  45-60 minutes    Taking medications regularly:  Not Applicable    Medication side effects:  Not applicable    PHQ-2 Total Score: 0    Additional concerns today:  No    History of wrist fracutre when hiking last spring, had surgery of L side, R side healed.     History of asthma. Coughing more she thinks is due to GERD. This is likely triggerd by weight loss. She does have fall related allergies, not on antihistamine.     Today's PHQ-2 Score:   PHQ-2 ( 1999 [employer]) 10/11/2022   Q1: Little interest or pleasure in doing things 0   Q2: Feeling down, depressed or hopeless 0   PHQ-2 Score 0   PHQ-2 Total Score (12-17 Years)- Positive if 3 or more points; Administer PHQ-A if positive -   Q1: Little interest or pleasure in doing things Not at all   Q2: Feeling down, depressed or hopeless Not at all   PHQ-2 Score 0       Abuse: Current or Past (Physical, Sexual or Emotional) - No  Do you feel safe in your environment? Yes        Social History     Tobacco Use     Smoking status: Never     Smokeless tobacco: Never   Substance Use Topics     Alcohol use: Yes     Alcohol/week: 1.0 standard drink     Comment: 2 drinks per week     If you drink alcohol do you typically have >3 drinks per day or >7 drinks per week? No    Alcohol Use 10/11/2022   Prescreen: >3 drinks/day or >7 drinks/week? No   Prescreen: >3 drinks/day or >7 drinks/week? -       Reviewed orders with patient.  Reviewed health maintenance and updated orders accordingly - Yes  Lab work is in  process    Breast Cancer Screening:    FHS-7:   Breast CA Risk Assessment (FHS-7) 3/21/2022   Did any of your first-degree relatives have breast or ovarian cancer? No   Did any of your relatives have bilateral breast cancer? No   Did any man in your family have breast cancer? No   Did any woman in your family have breast and ovarian cancer? No   Did any woman in your family have breast cancer before age 50 y? No   Do you have 2 or more relatives with breast and/or ovarian cancer? No   Do you have 2 or more relatives with breast and/or bowel cancer? No       Mammogram Screening: Recommended mammography every 1-2 years with patient discussion and risk factor consideration  Pertinent mammograms are reviewed under the imaging tab.    History of abnormal Pap smear: NO - age 30-65 PAP every 5 years with negative HPV co-testing recommended  PAP / HPV Latest Ref Rng & Units 10/31/2018 9/27/2013 3/16/2010   PAP (Historical) - NIL NIL NIL   HPV16 NEG:Negative Negative - -   HPV18 NEG:Negative Negative - -   HRHPV NEG:Negative Negative - -     Reviewed and updated as needed this visit by clinical staff   Tobacco  Allergies  Meds   Med Hx  Surg Hx  Fam Hx  Soc Hx        Reviewed and updated as needed this visit by Provider     Meds                 Review of Systems   Constitutional: Negative for chills and fever.   HENT: Negative for congestion, ear pain, hearing loss and sore throat.    Eyes: Negative for pain and visual disturbance.   Respiratory: Positive for cough. Negative for shortness of breath.    Cardiovascular: Negative for chest pain, palpitations and peripheral edema.   Gastrointestinal: Positive for heartburn. Negative for abdominal pain, constipation, diarrhea, hematochezia and nausea.   Genitourinary: Negative for dysuria, frequency, genital sores, hematuria and urgency.   Musculoskeletal: Positive for arthralgias and myalgias. Negative for joint swelling.   Skin: Negative for rash.   Neurological:  "Negative for dizziness, weakness, headaches and paresthesias.   Psychiatric/Behavioral: Negative for mood changes. The patient is not nervous/anxious.           OBJECTIVE:   /66   Pulse 74   Temp 98.3  F (36.8  C) (Tympanic)   Resp 16   Ht 1.702 m (5' 7\")   Wt 93.9 kg (207 lb 1.6 oz)   LMP 10/16/2015 (Exact Date)   SpO2 97%   BMI 32.44 kg/m    Physical Exam  GENERAL: healthy, alert and no distress  EYES: Eyes grossly normal to inspection, PERRL and conjunctivae and sclerae normal  HENT: ear canals and TM's normal, nose and mouth without ulcers or lesions  NECK: no adenopathy, no asymmetry, masses, or scars and thyroid normal to palpation  RESP: lungs clear to auscultation - no rales, rhonchi or wheezes  CV: regular rate and rhythm, normal S1 S2, no S3 or S4, no murmur, click or rub, no peripheral edema and peripheral pulses strong  MS: no gross musculoskeletal defects noted, no edema  PSYCH: mentation appears normal, affect normal/bright        ASSESSMENT/PLAN:   (Z00.00) Encounter for preventive health examination  (primary encounter diagnosis)  Comment:   Plan: Lipid panel reflex to direct LDL Fasting,         Glucose, CBC with platelets and differential,         TSH with free T4 reflex          (Z12.11) Screen for colon cancer  Comment:   Plan: TSH with free T4 reflex          (Z87.81) History of wrist fracture  Comment: may 2022  Plan: DX Hip/Pelvis/Spine, TSH with free T4 reflex        Discussed wrist fracture can be diagnostic of osteoporosis. Will refer for dexa. Calcium supplementation reviewed    (J45.20) Mild intermittent asthma without complication  Comment: stable  Plan: TSH with free T4 reflex          (D68.51) Factor 5 Leiden mutation, heterozygous (H)  Comment:   Plan: CBC with platelets and differential, TSH with         free T4 reflex                  COUNSELING:  Reviewed preventive health counseling, as reflected in patient instructions  Special attention given to:        Regular " "exercise       Healthy diet/nutrition    Estimated body mass index is 32.44 kg/m  as calculated from the following:    Height as of this encounter: 1.702 m (5' 7\").    Weight as of this encounter: 93.9 kg (207 lb 1.6 oz).    Weight management plan: Discussed healthy diet and exercise guidelines    She reports that she has never smoked. She has never used smokeless tobacco.      Counseling Resources:  ATP IV Guidelines  Pooled Cohorts Equation Calculator  Breast Cancer Risk Calculator  BRCA-Related Cancer Risk Assessment: FHS-7 Tool  FRAX Risk Assessment  ICSI Preventive Guidelines  Dietary Guidelines for Americans, 2010  USDA's MyPlate  ASA Prophylaxis  Lung CA Screening    Tawny Colunga, SANDOR CNP  [facility] [place]  "

## 2022-10-15 ENCOUNTER — HEALTH MAINTENANCE LETTER (OUTPATIENT)
Age: 57
End: 2022-10-15

## 2022-10-21 ENCOUNTER — ANCILLARY PROCEDURE (OUTPATIENT)
Dept: BONE DENSITY | Facility: CLINIC | Age: 57
End: 2022-10-21
Attending: NURSE PRACTITIONER
Payer: COMMERCIAL

## 2022-10-21 DIAGNOSIS — Z87.81 HISTORY OF WRIST FRACTURE: ICD-10-CM

## 2022-10-21 PROCEDURE — 77080 DXA BONE DENSITY AXIAL: CPT | Performed by: INTERNAL MEDICINE

## 2022-11-08 ENCOUNTER — TRANSFERRED RECORDS (OUTPATIENT)
Dept: HEALTH INFORMATION MANAGEMENT | Facility: CLINIC | Age: 57
End: 2022-11-08

## 2023-01-15 ENCOUNTER — MYC MEDICAL ADVICE (OUTPATIENT)
Dept: PEDIATRICS | Facility: CLINIC | Age: 58
End: 2023-01-15

## 2023-01-15 DIAGNOSIS — K21.00 GASTROESOPHAGEAL REFLUX DISEASE WITH ESOPHAGITIS, UNSPECIFIED WHETHER HEMORRHAGE: Primary | ICD-10-CM

## 2023-01-16 NOTE — TELEPHONE ENCOUNTER
"Please see patient MyChart message. Per OV notes 10/11/22, \"History of asthma. Coughing more she thinks is due to GERD. This is likely triggerd by weight loss. She does have fall related allergies, not on antihistamine.\"     Visit to discuss further?     Abelnio SANTOYO RN 1/16/2023 at 10:36 AM    "

## 2023-01-16 NOTE — TELEPHONE ENCOUNTER
Printed and faxed referral to [facility] [phone] on 1/16/23.     Abelino SANTOYO RN 1/16/2023 at 1:23 PM

## 2023-02-17 ENCOUNTER — MYC MEDICAL ADVICE (OUTPATIENT)
Dept: CALL CENTER | Age: 58
End: 2023-02-17
Payer: COMMERCIAL

## 2023-02-27 ENCOUNTER — TRANSFERRED RECORDS (OUTPATIENT)
Dept: HEALTH INFORMATION MANAGEMENT | Facility: CLINIC | Age: 58
End: 2023-02-27
Payer: COMMERCIAL

## 2023-03-23 ENCOUNTER — ANCILLARY PROCEDURE (OUTPATIENT)
Dept: MAMMOGRAPHY | Facility: CLINIC | Age: 58
End: 2023-03-23
Attending: NURSE PRACTITIONER
Payer: COMMERCIAL

## 2023-03-23 DIAGNOSIS — Z12.31 VISIT FOR SCREENING MAMMOGRAM: ICD-10-CM

## 2023-03-23 PROCEDURE — 77063 BREAST TOMOSYNTHESIS BI: CPT | Performed by: RADIOLOGY

## 2023-03-23 PROCEDURE — 77067 SCR MAMMO BI INCL CAD: CPT | Performed by: RADIOLOGY

## 2023-04-03 ENCOUNTER — OFFICE VISIT (OUTPATIENT)
Dept: PODIATRY | Facility: CLINIC | Age: 58
End: 2023-04-03
Payer: COMMERCIAL

## 2023-04-03 ENCOUNTER — ANCILLARY PROCEDURE (OUTPATIENT)
Dept: GENERAL RADIOLOGY | Facility: CLINIC | Age: 58
End: 2023-04-03
Attending: PODIATRIST
Payer: COMMERCIAL

## 2023-04-03 VITALS — SYSTOLIC BLOOD PRESSURE: 100 MMHG | DIASTOLIC BLOOD PRESSURE: 70 MMHG

## 2023-04-03 DIAGNOSIS — G89.29 CHRONIC PAIN OF RIGHT ANKLE: Primary | ICD-10-CM

## 2023-04-03 DIAGNOSIS — G89.29 CHRONIC PAIN OF RIGHT ANKLE: ICD-10-CM

## 2023-04-03 DIAGNOSIS — M25.571 CHRONIC PAIN OF RIGHT ANKLE: Primary | ICD-10-CM

## 2023-04-03 DIAGNOSIS — M25.571 CHRONIC PAIN OF RIGHT ANKLE: ICD-10-CM

## 2023-04-03 PROCEDURE — 99204 OFFICE O/P NEW MOD 45 MIN: CPT | Performed by: PODIATRIST

## 2023-04-03 PROCEDURE — 73610 X-RAY EXAM OF ANKLE: CPT | Mod: TC | Performed by: RADIOLOGY

## 2023-04-03 RX ORDER — FAMOTIDINE 20 MG/1
20 TABLET, FILM COATED ORAL 2 TIMES DAILY
COMMUNITY

## 2023-04-03 NOTE — PROGRESS NOTES
"Foot & Ankle Surgery  April 3, 2023    CC: \"Ongoing right foot pain\"    I was asked to see Nkechi Manrique regarding the chief complaint by: Self    HPI:  Pt is a 57 year old female who presents with above complaint.  Right foot and ankle pain \"on and off since June 2022.  Flareup now\".  She states she had an injury in May 2022 where she injured her right ankle, her knee and her wrists.  The other injuries were more prominent and so the right ankle injury was not addressed, per patient report.  She also has a history of a previous stress fracture in the right lower extremity and plantar fasciitis.  Current symptoms feel similar but different than previous plantar fasciitis symptoms.  She has occasional flareups with activities.  She points to the medial right ankle, as well as the medial and lateral heel, and to the plantar and plantar/posterior right heel.  \"Stretching helps\".  Certain shoes help out around the house.  She describes morning pain.  Right ankle/heel pain.    ROS:   Pos for CC.  The patient denies current nausea, vomiting, chills, fevers, belly pain, calf pain, chest pain or SOB.  Complete remainder of ROS is otherwise neg.    VITALS:    Vitals:    04/03/23 0959   BP: 100/70       PMH:    Past Medical History:   Diagnosis Date     Abnormal Pap smear 1990    Biopsy- ok     Anxiety      Blood dyscrasia     Factor 5 Leiden HTZ     Breast lump 09/27/2013     CMC arthritis, thumb, degenerative 11/03/2015     Congenital deficiency of other clotting factors     Factor V Leiden positive, had superficial thrombus, no DVT     Mild intermittent asthma with exacerbation     seasonally related, rare albuterol     Nonsenile cataract      Plantar fascia syndrome     L ft in '10, R ft in '11     Seasonal allergic rhinitis     claritin spring/August     Stress fracture of lower leg ~2002, 2009     Varicosities 2015    Varicose veins removed     Wrist fracture, left, closed, initial encounter 06/02/2022       SXHX:  " "  Past Surgical History:   Procedure Laterality Date     BIOPSY  1998    Skin tags moles cysts     COLONOSCOPY  2015    Polyp removed     MOHS MICROGRAPHIC PROCEDURE       PHACOEMULSIFICATION CLEAR CORNEA WITH TORIC INTRAOCULAR LENS IMPLANT Left 2020    Procedure: LEFT CATARACT REMOVAL WITH INTRAOCULAR TORIC LENS IMPLANT;  Surgeon: Dianna Knowles MD;  Location: [facility] OR     PHLEBECTOMY MULTIPLE STAB  10/15    [facility], Dr. Ramirez        MEDS:    Current Outpatient Medications   Medication     famotidine (PEPCID) 20 MG tablet     albuterol (PROAIR HFA/PROVENTIL HFA/VENTOLIN HFA) 108 (90 Base) MCG/ACT inhaler     Elastic Bandages & Supports (MEDICAL COMPRESSION SOCKS) MISC     EPINEPHrine (ANY BX GENERIC EQUIV) 0.3 MG/0.3ML injection 2-pack     fexofenadine (ALLEGRA) 60 MG tablet     fluticasone (FLONASE) 50 MCG/ACT nasal spray     No current facility-administered medications for this visit.       ALL:     Allergies   Allergen Reactions     Septra [Bactrim] Hives     Wasps [Hornets] Swelling     Significant leg swelling- epi pen rec in case worsening sx's       FMH:    Family History   Problem Relation Age of Onset     Hypertension Mother         meds     Depression Mother      Cerebrovascular Disease Father      Circulatory Father         phlebitis, blood clots in leg veins     Alzheimer Disease Father         dx ~ in mid 60s     Deep Vein Thrombosis (DVT) Father      Breast Cancer Maternal Grandmother         postmenopausal     Cancer Brother         pancreas,  age 25     Leukemia Brother         LGL     Genetic Disorder Brother      Other Cancer Brother         Pancreatic     Cancer Brother         Thymus Cancer--\"Goods Symdrone\"     Esophageal Cancer Brother      Psychotic Disorder Sister         ? depression, schizophrenia     Anesthesia Reaction No family hx of        SocHx:    Social History     Socioeconomic History     Marital status:      Spouse name: Not on file     Number of " children: Not on file     Years of education: Not on file     Highest education level: Not on file   Occupational History     Employer: [employer]     Comment: Birthplace   Tobacco Use     Smoking status: Never     Smokeless tobacco: Never   Vaping Use     Vaping status: Never Used   Substance and Sexual Activity     Alcohol use: Yes     Alcohol/week: 1.0 standard drink of alcohol     Comment: 2 drinks per week     Drug use: No     Sexual activity: Yes     Partners: Male     Birth control/protection: Post-menopausal, Male Surgical   Other Topics Concern     Not on file   Social History Narrative    Caffeine intake/servings daily - 2-3    Calcium intake/servings daily - 2-3    Exercise 6-7 times weekly - describe biking, swimming, wts    Sunscreen used - Yes    Seatbelts used - Yes    Guns stored in the home - No    Self Breast Exam - No    Pap test up to date -  Yes    Eye exam up to date -  Yes    Dental exam up to date -  Yes    DEXA scan up to date -  Not Applicable    Flex Sig/Colonoscopy up to date -  Not Applicable    Mammography up to date -  Yes    Immunizations reviewed and up to date - Yes    Abuse: Current or Past (Physical, Sexual or Emotional) - No    Do you feel safe in your environment - Yes    Do you cope well with stress - Yes    Do you suffer from insomnia - No    Rosy Lam LPN      '10                             Social Determinants of Health     Financial Resource Strain: Low Risk  (6/2/2022)    Overall Financial Resource Strain (CARDIA)      Difficulty of Paying Living Expenses: Not very hard   Food Insecurity: No Food Insecurity (6/2/2022)    Hunger Vital Sign      Worried About Running Out of Food in the Last Year: Never true      Ran Out of Food in the Last Year: Never true   Transportation Needs: No Transportation Needs (6/2/2022)    PRAPARE - Transportation      Lack of Transportation (Medical): No      Lack of Transportation (Non-Medical): No   Physical  Activity: Sufficiently Active (6/2/2022)    Exercise Vital Sign      Days of Exercise per Week: 6 days      Minutes of Exercise per Session: 150+ min   Stress: No Stress Concern Present (6/2/2022)    [employer] - Occupational Stress Questionnaire      Feeling of Stress : Only a little   Social Connections: Socially Integrated (6/2/2022)    Social Connection and Isolation Panel [NHANES]      Frequency of Communication with Friends and Family: More than three times a week      Frequency of Social Gatherings with Friends and Family: More than three times a week      Attends Islam Services: 1 to 4 times per year      Active Member of Clubs or Organizations: Yes      Attends Club or Organization Meetings: Not on file      Marital Status:    Intimate Partner Violence: Not on file   Housing Stability: Low Risk  (6/2/2022)    Housing Stability Vital Sign      Unable to Pay for Housing in the Last Year: No      Number of Places Lived in the Last Year: 1      Unstable Housing in the Last Year: No           EXAMINATION:  Gen:   No apparent distress  Neuro:   A&Ox3, no deficits  Psych:    Answering questions appropriately for age and situation with normal affect  Head:    NCAT  Eye:    Visual scanning without deficit  Ear:    Response to auditory stimuli wnl  Lung:    Non-labored breathing on RA noted  Abd:    NTND per patient report  Lymph:    Neg for pitting/non-pitting edema BLE  Vasc:    Pulses palpable, CFT minimally delayed  Neuro:    Light touch sensation intact to all sensory nerve distributions without paresthesias  Derm:    Neg for nodules, lesions or ulcerations  MSK:    Right lower extremity - triple compression stress test shows mild discomfort at the tarsal tunnel.  There is mild tenderness on palpation along the tibial nerve/branches.  Baxters nerve is unremarkable.  She is very tender at the plantar medial and plantar heel.  No pain along the abductor hallucis muscle  belly or the central band of the plantar fascia.  No pain with lateral calcaneal compression.  There is mild discomfort at the fifth metatarsal base and the peroneus brevis insertion.  Calf:    Neg for redness, swelling or tenderness  She denies lower back issues and denies an antalgic gait secondary to    Imaging: 3 views weightbearing right ankle -prominent plantar spur.  Irregularity at the tip of the medial malleolus, consistent with previous injury, no acute fractures are seen.    Assessment:  57 year old female with right lower extremity pain, including tarsal tunnel syndrome and plantar fasciitis, in setting of previous right ankle injury      Medical Decision Making/Plan:  Discussed etiologies, anatomy and options  1.  Right lower extremity pain, including tarsal tunnel syndrome and plantar fasciitis, in setting of previous right ankle injury  -I personally reviewed and interpreted the patient's lower extremity history pertinent to today's visit, including imaging/labs, in preparation for initiating a treatment program.  -I personally reviewed and interpreted the x-ray results.  -Regarding the heel and pain, the Plantar Fasciitis handout was dispensed and discussed.  We talked about stretching, resting/activity modification, icing, NSAID/tylenol use as tolerated, inserts, supportive/comfortable shoes and minimizing shoeless walking.    -discussed Achilles, plantar fascial and hamstring stretches  -OTC insert information dispensed and discussed   -She has used Voltaren gel without improvement in symptoms.  She has an upcoming procedure in the next few weeks, and will not be utilizing oral anti-inflammatories.  -Based on severity and duration of symptoms, as well as the fact that she had previous right ankle injury and is having pain at the level of the previous injury, an MRI was ordered for further assessment.  She will follow-up afterwards to review the report and results.    Follow up:  After MRI or sooner  with acute issues      Patient's medical history was reviewed today      Vincent Leonard DPM [facility]  Podiatric Foot & Ankle Surgeon  [facility]  [phone]    Disclaimer: This note consists of symbols derived from keyboarding, dictation and/or voice recognition software. As a result, there may be errors in the script that have gone undetected. Please consider this when interpreting information found in this chart.

## 2023-04-03 NOTE — PATIENT INSTRUCTIONS
Thank you for choosing [facility] / Foot & Ankle Surgery!    DR. RIOS'S CLINIC LOCATIONS:     [facility] (Friday) TRIAGE LINE: [phone] [address] [facility]  APPOINTMENTS: [phone]   [place] [address] RADIOLOGY: [phone]    PHYSICAL THERAPY: [phone]    SET UP SURGERY: [phone]   [place] (Mon-Tues AM-Thurs) BILLING QUESTIONS: [phone]   [address] FAX: [phone]   [place] [address]            PLANTAR FASCIITIS  Plantar fasciitis is often referred to as heel spurs or heel pain. Plantar fasciitis is a very common problem that affects people of all foot shapes, age, weight and activity level. Pain may be in the arch or on the weight-bearing surface of the heel. The pain may come on without injury or identifiable cause. Pain is generally present when first getting out of bed in the morning or up from a seated break.     CAUSES  The plantar fascia is a dense fibrous band of tissue that stretches across the bottom surface of the foot. The fascia helps support the foot muscles and arch. Plantar fasciitis is thought to be caused by mechanical strain or overload. Frequent walking without shoes or wearing unsupportive shoes is thought to cause structural overload and ultimately inflammation of the plantar fascia. Some people have heel spurs that can be seen on x-ray. The heel spur is actually a minor component of plantar fascitis and is largely ignored.       SELF TREATMENT   The easiest solution is to stop walking around your home without shoes. Plantar fasciitis is largely a shoe problem. Shoes are either not being worn often enough or your current shoes are inadequate for your weight, foot structure or activity level. The majority of shoes on the market today are not sufficient to resist development of plantar fasciitis or to promote healing. Assume that your current shoes are inadequate and will need to be replaced. Even high quality shoes wear  out with 6 months to one year of frequent use. Weight loss is another option. Losing ten pounds in the next two months may be enough to resolve the problem. Ice applied to the area of pain two to three times per day for ten minutes each session can be very helpful. Warm foot soaks in epsom salts can also relieve pain. This should continue until the problem resolves. Achilles tendon stretching is essential. Stretch multiple times daily to promote healing and to prevent recurrence in the future. Over all stretching of the body is helpful as well such as the calves, thighs and lower back. Normally when one area of the body is tight, other areas are too. Gentle Yoga can be good for this.     Over the counter topical anti inflammatories can be helpful such as biofreeze, bengay, salon pas, ect...  Oral ibuprofen or aleve is recommended as well to try to calm down inflammation.     Night splints can be helpful to gradually stretch the foot at night as a lot of pain is when you get up in the morning. Taking a towel or thera band and stretching the foot back multiple times before you get ou of bed can be beneficial as well.     MEDICAL TREATMENT  Medical treatments often include custom arch supports, cortisone injections, physical therapy, splints to be worn in bed, prescription medications and surgery. The home treatments listed above will be necessary regardless of these advanced medical treatments. Surgery is rarely needed but is very helpful in selected cases.     PROGNOSIS  Plantar fasciitis can last from one day to a lifetime. Some people get intermittent fascitis that is very short-lived. Others suffer daily for years. Excessive body weight, frequent bare foot walking, long hours on the feet, inadequate shoes, predisposing foot structures and excessive activity such as running are all potential issues that lead to chronic and/or recurring plantar fascitis. Having plantar fasciitis means that you are forever prone to  this problem and will require modification of some of the above factors. Most people seek treatment within one to four months. Healing usually requires a similar one to four month time frame. Healing time is relative to the amount of effort spent treating the problem.   Plantar fasciitis is highly recurrent. Risk factors often continue, including return to bare foot walking, inadequate shoes, excessive body weight, excessive activities, etc. Your life style and foot structure may predispose you to recurrent plantar fasciitis. A daily prevention regimen can be very helpful. Ongoing use of shoe inserts, careful attention to appropriate shoes, daily Achilles stretching, etc. may prevent recurrence. Prompt attention at the earliest warning signs of heel pain can resolve the problem in as short as a few days.     EXERCISES  Stair Exercise: Step on the stairs with the ball of your foot and hold your position for at least 15 seconds, then slowly step down with the heels of your foot. You can do this daily and as often as you want.   Picking the Towel: Sit comfortably and then pick the towel up with your toes. You can use any object other than a towel as long as the material can be soft and you can pick it up with your toes.  Rolling the Bottle: Use a small ball or frozen water bottle and then roll it around with your foot.   Flex the Toes: Sit comfortably and then flex your toes by pointing it towards the floor or towards your body. This will relax and flex your foot and exercise your plantar fascia, the calf, and the Achilles tendon. The inability of the foot to stretch often causes the bunching up of the plantar fascia area leading to the pain.  Calf/Achilles Stretching: Lay on you back and raise one foot, then point your toes towards the floor. See photo below:               Hold each stretch for 10 seconds. Stretch 10 times per set, three sets per day. Morning, afternoon and evening. If your heel pain is very severe in  the morning, consider doing the first set of stretches before you get out of bed.      OVER THE COUNTER INSERT RECOMMENDATIONS  SuperFeet   Sofsole Fit Spenco   Power Step   Walk-Fit Arch Cradles     Most of these can be found at your local [employer], [employer], or online.  **A good high quality over the counter insert should cost around $40-$50      [employer] LOCATIONS  [place]  [address] [place] [address]  [phone]   [address]  [phone] [place]  [address] [place] [address]  [phone]   [place]  [address] [facility] [facility] [address]  [phone]   [place]  [address]  [phone] [place] [address]  [phone]   [place]  [address]  [phone]   [place]  [address]  [phone] [place]  [address]  [phone]       Follow Up: After MRI

## 2023-04-03 NOTE — LETTER
"    4/3/2023         RE: Nkechi Manrique  [address] [facility] [address]        Dear Colleague,    Thank you for referring your patient, Nkechi Manrique, to the [facility]. Please see a copy of my visit note below.    Foot & Ankle Surgery  April 3, 2023    CC: \"Ongoing right foot pain\"    I was asked to see Nkechi Manrique regarding the chief complaint by: Self    HPI:  Pt is a 57 year old female who presents with above complaint.  Right foot and ankle pain \"on and off since June 2022.  Flareup now\".  She states she had an injury in May 2022 where she injured her right ankle, her knee and her wrists.  The other injuries were more prominent and so the right ankle injury was not addressed, per patient report.  She also has a history of a previous stress fracture in the right lower extremity and plantar fasciitis.  Current symptoms feel similar but different than previous plantar fasciitis symptoms.  She has occasional flareups with activities.  She points to the medial right ankle, as well as the medial and lateral heel, and to the plantar and plantar/posterior right heel.  \"Stretching helps\".  Certain shoes help out around the house.  She describes morning pain.  Right ankle/heel pain.    ROS:   Pos for CC.  The patient denies current nausea, vomiting, chills, fevers, belly pain, calf pain, chest pain or SOB.  Complete remainder of ROS is otherwise neg.    VITALS:    Vitals:    04/03/23 0959   BP: 100/70       PMH:    Past Medical History:   Diagnosis Date     Abnormal Pap smear 1990    Biopsy- ok     Anxiety      Blood dyscrasia     Factor 5 Leiden HTZ     Breast lump 09/27/2013     CMC arthritis, thumb, degenerative 11/03/2015     Congenital deficiency of other clotting factors     Factor V Leiden positive, had superficial thrombus, no DVT     Mild intermittent asthma with exacerbation     seasonally related, rare albuterol     Nonsenile cataract      Plantar fascia " "syndrome     L ft in '10, R ft in '     Seasonal allergic rhinitis     claritin spring/August     Stress fracture of lower leg ~,      Varicosities 2015    Varicose veins removed     Wrist fracture, left, closed, initial encounter 2022       SXHX:    Past Surgical History:   Procedure Laterality Date     BIOPSY  1998    Skin tags moles cysts     COLONOSCOPY  2015    Polyp removed     MOHS MICROGRAPHIC PROCEDURE       PHACOEMULSIFICATION CLEAR CORNEA WITH TORIC INTRAOCULAR LENS IMPLANT Left 2020    Procedure: LEFT CATARACT REMOVAL WITH INTRAOCULAR TORIC LENS IMPLANT;  Surgeon: Dianna Knowles MD;  Location: [facility] OR     PHLEBECTOMY MULTIPLE STAB  10/15    [facility],  [facility]        MEDS:    Current Outpatient Medications   Medication     famotidine (PEPCID) 20 MG tablet     albuterol (PROAIR HFA/PROVENTIL HFA/VENTOLIN HFA) 108 (90 Base) MCG/ACT inhaler     Elastic Bandages & Supports (MEDICAL COMPRESSION SOCKS) MISC     EPINEPHrine (ANY BX GENERIC EQUIV) 0.3 MG/0.3ML injection 2-pack     fexofenadine (ALLEGRA) 60 MG tablet     fluticasone (FLONASE) 50 MCG/ACT nasal spray     No current facility-administered medications for this visit.       ALL:     Allergies   Allergen Reactions     Septra [Bactrim] Hives     Wasps [Hornets] Swelling     Significant leg swelling- epi pen rec in case worsening sx's       FMH:    Family History   Problem Relation Age of Onset     Hypertension Mother         meds     Depression Mother      Cerebrovascular Disease Father      Circulatory Father         phlebitis, blood clots in leg veins     Alzheimer Disease Father         dx ~ in mid 60s     Deep Vein Thrombosis (DVT) Father      Breast Cancer Maternal Grandmother         postmenopausal     Cancer Brother         pancreas,  age 25     Leukemia Brother         LGL     Genetic Disorder Brother      Other Cancer Brother         Pancreatic     Cancer Brother         Thymus Cancer--\"Goods Symdrone\"     " Esophageal Cancer Brother      Psychotic Disorder Sister         ? depression, schizophrenia     Anesthesia Reaction No family hx of        SocHx:    Social History     Socioeconomic History     Marital status:      Spouse name: Not on file     Number of children: Not on file     Years of education: Not on file     Highest education level: Not on file   Occupational History     Employer: [employer]     Comment: Birthplace   Tobacco Use     Smoking status: Never     Smokeless tobacco: Never   Vaping Use     Vaping status: Never Used   Substance and Sexual Activity     Alcohol use: Yes     Alcohol/week: 1.0 standard drink of alcohol     Comment: 2 drinks per week     Drug use: No     Sexual activity: Yes     Partners: Male     Birth control/protection: Post-menopausal, Male Surgical   Other Topics Concern     Not on file   Social History Narrative    Caffeine intake/servings daily - 2-3    Calcium intake/servings daily - 2-3    Exercise 6-7 times weekly - describe biking, swimming, wts    Sunscreen used - Yes    Seatbelts used - Yes    Guns stored in the home - No    Self Breast Exam - No    Pap test up to date -  Yes    Eye exam up to date -  Yes    Dental exam up to date -  Yes    DEXA scan up to date -  Not Applicable    Flex Sig/Colonoscopy up to date -  Not Applicable    Mammography up to date -  Yes    Immunizations reviewed and up to date - Yes    Abuse: Current or Past (Physical, Sexual or Emotional) - No    Do you feel safe in your environment - Yes    Do you cope well with stress - Yes    Do you suffer from insomnia - No    Rosy Lam LPN      '10                             Social Determinants of Health     Financial Resource Strain: Low Risk  (6/2/2022)    Overall Financial Resource Strain (CARDIA)      Difficulty of Paying Living Expenses: Not very hard   Food Insecurity: No Food Insecurity (6/2/2022)    Hunger Vital Sign      Worried About Running Out of Food in the  Last Year: Never true      Ran Out of Food in the Last Year: Never true   Transportation Needs: No Transportation Needs (6/2/2022)    PRAPARE - Transportation      Lack of Transportation (Medical): No      Lack of Transportation (Non-Medical): No   Physical Activity: Sufficiently Active (6/2/2022)    Exercise Vital Sign      Days of Exercise per Week: 6 days      Minutes of Exercise per Session: 150+ min   Stress: No Stress Concern Present (6/2/2022)    [employer] - Occupational Stress Questionnaire      Feeling of Stress : Only a little   Social Connections: Socially Integrated (6/2/2022)    Social Connection and Isolation Panel [NHANES]      Frequency of Communication with Friends and Family: More than three times a week      Frequency of Social Gatherings with Friends and Family: More than three times a week      Attends Yazidi Services: 1 to 4 times per year      Active Member of Clubs or Organizations: Yes      Attends Club or Organization Meetings: Not on file      Marital Status:    Intimate Partner Violence: Not on file   Housing Stability: Low Risk  (6/2/2022)    Housing Stability Vital Sign      Unable to Pay for Housing in the Last Year: No      Number of Places Lived in the Last Year: 1      Unstable Housing in the Last Year: No           EXAMINATION:  Gen:   No apparent distress  Neuro:   A&Ox3, no deficits  Psych:    Answering questions appropriately for age and situation with normal affect  Head:    NCAT  Eye:    Visual scanning without deficit  Ear:    Response to auditory stimuli wnl  Lung:    Non-labored breathing on RA noted  Abd:    NTND per patient report  Lymph:    Neg for pitting/non-pitting edema BLE  Vasc:    Pulses palpable, CFT minimally delayed  Neuro:    Light touch sensation intact to all sensory nerve distributions without paresthesias  Derm:    Neg for nodules, lesions or ulcerations  MSK:    Right lower extremity - triple compression stress  test shows mild discomfort at the tarsal tunnel.  There is mild tenderness on palpation along the tibial nerve/branches.  Baxters nerve is unremarkable.  She is very tender at the plantar medial and plantar heel.  No pain along the abductor hallucis muscle belly or the central band of the plantar fascia.  No pain with lateral calcaneal compression.  There is mild discomfort at the fifth metatarsal base and the peroneus brevis insertion.  Calf:    Neg for redness, swelling or tenderness  She denies lower back issues and denies an antalgic gait secondary to    Imaging: 3 views weightbearing right ankle -prominent plantar spur.  Irregularity at the tip of the medial malleolus, consistent with previous injury, no acute fractures are seen.    Assessment:  57 year old female with right lower extremity pain, including tarsal tunnel syndrome and plantar fasciitis, in setting of previous right ankle injury      Medical Decision Making/Plan:  Discussed etiologies, anatomy and options  1.  Right lower extremity pain, including tarsal tunnel syndrome and plantar fasciitis, in setting of previous right ankle injury  -I personally reviewed and interpreted the patient's lower extremity history pertinent to today's visit, including imaging/labs, in preparation for initiating a treatment program.  -I personally reviewed and interpreted the x-ray results.  -Regarding the heel and pain, the Plantar Fasciitis handout was dispensed and discussed.  We talked about stretching, resting/activity modification, icing, NSAID/tylenol use as tolerated, inserts, supportive/comfortable shoes and minimizing shoeless walking.    -discussed Achilles, plantar fascial and hamstring stretches  -OTC insert information dispensed and discussed   -She has used Voltaren gel without improvement in symptoms.  She has an upcoming procedure in the next few weeks, and will not be utilizing oral anti-inflammatories.  -Based on severity and duration of symptoms, as  well as the fact that she had previous right ankle injury and is having pain at the level of the previous injury, an MRI was ordered for further assessment.  She will follow-up afterwards to review the report and results.    Follow up:  After MRI or sooner with acute issues      Patient's medical history was reviewed today      Vincent Leonard DPM FACFAS FACFAOM  Podiatric Foot & Ankle Surgeon  [facility]  [phone]    Disclaimer: This note consists of symbols derived from keyboarding, dictation and/or voice recognition software. As a result, there may be errors in the script that have gone undetected. Please consider this when interpreting information found in this chart.            Again, thank you for allowing me to participate in the care of your patient.        Sincerely,        Vincent Leonard DPM, JAIME

## 2023-04-11 ENCOUNTER — HOSPITAL ENCOUNTER (OUTPATIENT)
Dept: MRI IMAGING | Facility: CLINIC | Age: 58
Discharge: HOME | End: 2023-04-11
Attending: PODIATRIST | Admitting: PODIATRIST
Payer: COMMERCIAL

## 2023-04-11 DIAGNOSIS — M25.571 CHRONIC PAIN OF RIGHT ANKLE: ICD-10-CM

## 2023-04-11 DIAGNOSIS — G89.29 CHRONIC PAIN OF RIGHT ANKLE: ICD-10-CM

## 2023-04-11 PROCEDURE — 73721 MRI JNT OF LWR EXTRE W/O DYE: CPT | Mod: RT

## 2023-04-11 PROCEDURE — 73721 MRI JNT OF LWR EXTRE W/O DYE: CPT | Mod: 26 | Performed by: RADIOLOGY

## 2023-04-18 ENCOUNTER — OFFICE VISIT (OUTPATIENT)
Dept: PEDIATRICS | Facility: CLINIC | Age: 58
End: 2023-04-18
Payer: COMMERCIAL

## 2023-04-18 VITALS
BODY MASS INDEX: 32.19 KG/M2 | SYSTOLIC BLOOD PRESSURE: 112 MMHG | DIASTOLIC BLOOD PRESSURE: 64 MMHG | RESPIRATION RATE: 16 BRPM | OXYGEN SATURATION: 99 % | HEART RATE: 71 BPM | HEIGHT: 67 IN | TEMPERATURE: 98 F | WEIGHT: 205.1 LBS

## 2023-04-18 DIAGNOSIS — Z01.818 PREOP GENERAL PHYSICAL EXAM: Primary | ICD-10-CM

## 2023-04-18 DIAGNOSIS — J45.30 MILD PERSISTENT ASTHMA WITHOUT COMPLICATION: ICD-10-CM

## 2023-04-18 DIAGNOSIS — K31.7 POLYP OF STOMACH: ICD-10-CM

## 2023-04-18 DIAGNOSIS — D68.51 FACTOR 5 LEIDEN MUTATION, HETEROZYGOUS (H): ICD-10-CM

## 2023-04-18 DIAGNOSIS — J98.01 BRONCHOSPASM: ICD-10-CM

## 2023-04-18 PROCEDURE — 99214 OFFICE O/P EST MOD 30 MIN: CPT | Performed by: NURSE PRACTITIONER

## 2023-04-18 RX ORDER — ALBUTEROL SULFATE 90 UG/1
2 AEROSOL, METERED RESPIRATORY (INHALATION) EVERY 6 HOURS
Qty: 18 G | Refills: 0 | Status: SHIPPED | OUTPATIENT
Start: 2023-04-18 | End: 2023-11-27

## 2023-04-18 ASSESSMENT — ASTHMA QUESTIONNAIRES
QUESTION_2 LAST FOUR WEEKS HOW OFTEN HAVE YOU HAD SHORTNESS OF BREATH: NOT AT ALL
QUESTION_1 LAST FOUR WEEKS HOW MUCH OF THE TIME DID YOUR ASTHMA KEEP YOU FROM GETTING AS MUCH DONE AT WORK, SCHOOL OR AT HOME: NONE OF THE TIME
ACT_TOTALSCORE: 24
QUESTION_4 LAST FOUR WEEKS HOW OFTEN HAVE YOU USED YOUR RESCUE INHALER OR NEBULIZER MEDICATION (SUCH AS ALBUTEROL): NOT AT ALL
QUESTION_3 LAST FOUR WEEKS HOW OFTEN DID YOUR ASTHMA SYMPTOMS (WHEEZING, COUGHING, SHORTNESS OF BREATH, CHEST TIGHTNESS OR PAIN) WAKE YOU UP AT NIGHT OR EARLIER THAN USUAL IN THE MORNING: NOT AT ALL
QUESTION_5 LAST FOUR WEEKS HOW WOULD YOU RATE YOUR ASTHMA CONTROL: WELL CONTROLLED
ACT_TOTALSCORE: 24

## 2023-04-18 ASSESSMENT — PAIN SCALES - GENERAL: PAINLEVEL: MILD PAIN (2)

## 2023-04-18 NOTE — PATIENT INSTRUCTIONS
For informational purposes only. Not to replace the advice of your health care provider. Copyright   2003,  [facility]. All rights reserved. Clinically reviewed by Alison Kay MD. [employer] [id number] - REV .  Preparing for Your Surgery  Getting started  A nurse will call you to review your health history and instructions. They will give you an arrival time based on your scheduled surgery time. Please be ready to share:    Your doctor's clinic name and phone number    Your medical, surgical, and anesthesia history    A list of allergies and sensitivities    A list of medicines, including herbal treatments and over-the-counter drugs    Whether the patient has a legal guardian (ask how to send us the papers in advance)  Please tell us if you're pregnant--or if there's any chance you might be pregnant. Some surgeries may injure a fetus (unborn baby), so they require a pregnancy test. Surgeries that are safe for a fetus don't always need a test, and you can choose whether to have one.   If you have a child who's having surgery, please ask for a copy of Preparing for Your Child's Surgery.    Preparing for surgery    Within 10 to 30 days of surgery: Have a pre-op exam (sometimes called an H&P, or History and Physical). This can be done at a clinic or pre-operative center.  ? If you're having a , you may not need this exam. Talk to your care team.    At your pre-op exam, talk to your care team about all medicines you take. If you need to stop any medicines before surgery, ask when to start taking them again.  ? We do this for your safety. Many medicines can make you bleed too much during surgery. Some change how well surgery (anesthesia) drugs work.    Call your insurance company to let them know you're having surgery. (If you don't have insurance, call [phone].)    Call your clinic if there's any change in your health. This includes signs of a cold or flu (sore throat, runny nose,  cough, rash, fever). It also includes a scrape or scratch near the surgery site.    If you have questions on the day of surgery, call your hospital or surgery center.  Eating and drinking guidelines  For your safety: Unless your surgeon tells you otherwise, follow the guidelines below.    Eat and drink as usual until 8 hours before you arrive for surgery. After that, no food or milk.    Drink clear liquids until 2 hours before you arrive. These are liquids you can see through, like water, Gatorade, and Propel Water. They also include plain black coffee and tea (no cream or milk), candy, and breath mints. You can spit out gum when you arrive.    If you drink alcohol: Stop drinking it the night before surgery.    If your care team tells you to take medicine on the morning of surgery, it's okay to take it with a sip of water.  Preventing infection    Shower or bathe the night before and morning of your surgery. Follow the instructions your clinic gave you. (If no instructions, use regular soap.)    Don't shave or clip hair near your surgery site. We'll remove the hair if needed.    Don't smoke or vape the morning of surgery. You may chew nicotine gum up to 2 hours before surgery. A nicotine patch is okay.  ? Note: Some surgeries require you to completely quit smoking and nicotine. Check with your surgeon.    Your care team will make every effort to keep you safe from infection. We will:  ? Clean our hands often with soap and water (or an alcohol-based hand rub).  ? Clean the skin at your surgery site with a special soap that kills germs.  ? Give you a special gown to keep you warm. (Cold raises the risk of infection.)  ? Wear special hair covers, masks, gowns and gloves during surgery.  ? Give antibiotic medicine, if prescribed. Not all surgeries need antibiotics.  What to bring on the day of surgery    Photo ID and insurance card    Copy of your health care directive, if you have one    Glasses and hearing aids (bring  cases)  ? You can't wear contacts during surgery    Inhaler and eye drops, if you use them (tell us about these when you arrive)    CPAP machine or breathing device, if you use them    A few personal items, if spending the night    If you have . . .  ? A pacemaker, ICD (cardiac defibrillator) or other implant: Bring the ID card.  ? An implanted stimulator: Bring the remote control.  ? A legal guardian: Bring a copy of the certified (court-stamped) guardianship papers.  Please remove any jewelry, including body piercings. Leave jewelry and other valuables at home.  If you're going home the day of surgery    You must have a responsible adult drive you home. They should stay with you overnight as well.    If you don't have someone to stay with you, and you aren't safe to go home alone, we may keep you overnight. Insurance often won't pay for this.  After surgery  If it's hard to control your pain or you need more pain medicine, please call your surgeon's office.  Questions?   If you have any questions for your care team, list them here: _________________________________________________________________________________________________________________________________________________________________________ ____________________________________ ____________________________________ ____________________________________

## 2023-04-18 NOTE — PROGRESS NOTES
[facility]  [address]  [address]  [place] [address]  Phone: [phone]  Fax: [phone]  Primary Provider: Tawny Woodson  Pre-op Performing Provider: TAWNY WOODSON      PREOPERATIVE EVALUATION:  Today's date: 4/18/2023    Nkechi Manrique is a 57 year old female who presents for a preoperative evaluation.       View : No data to display.              Surgical Information:  Surgery/Procedure: ESOPHAGOGASTRODUODENOSCOPY MUCOSAL RESECTION UPPER  Surgery Location: [facility]   Surgeon: Dr. Boss   Surgery Date: 5/12/2023  Time of Surgery: 6AM   Where patient plans to recover: At home with family  Fax number for surgical facility: Note does not need to be faxed, will be available electronically in Epic.    Assessment & Plan     The proposed surgical procedure is considered LOW risk.    Preop general physical exam      Polyp of stomach      Mild persistent asthma without complication  Stalbe, will bring albuterol    Bronchospasm    - albuterol (PROAIR HFA/PROVENTIL HFA/VENTOLIN HFA) 108 (90 Base) MCG/ACT inhaler  Dispense: 18 g; Refill: 0    Factor 5 Leiden mutation, heterozygous (H)       Risks and Recommendations:  The patient has the following additional risks and recommendations for perioperative complications:   - No identified additional risk factors other than previously addressed    Medication Instructions:  Patient is to take all scheduled medications on the day of surgery    RECOMMENDATION:  APPROVAL GIVEN to proceed with proposed procedure, without further diagnostic evaluation.          Subjective     HPI related to upcoming procedure: STOMACH polyp        4/18/2023     8:43 AM   Preop Questions   1. Have you ever had a heart attack or stroke? No   2. Have you ever had surgery on your heart or blood vessels, such as a stent placement, a coronary artery bypass, or surgery on an artery in your head, neck, heart, or  legs? No   3. Do you have chest pain with activity? No   4. Do you have a history of  heart failure? No   5. Do you currently have a cold, bronchitis or symptoms of other infection? No   6. Do you have a cough, shortness of breath, or wheezing? No   7. Do you or anyone in your family have previous history of blood clots? YES - dad dvt in leg, stroke   8. Do you or does anyone in your family have a serious bleeding problem such as prolonged bleeding following surgeries or cuts? UNKNOWN - heterozygous for factor 5   9. Have you ever had problems with anemia or been told to take iron pills? No   10. Have you had any abnormal blood loss such as black, tarry or bloody stools, or abnormal vaginal bleeding? No   11. Have you ever had a blood transfusion? No   12. Are you willing to have a blood transfusion if it is medically needed before, during, or after your surgery? Yes   13. Have you or any of your relatives ever had problems with anesthesia? NO   14. Do you have sleep apnea, excessive snoring or daytime drowsiness? No   15. Do you have any artifical heart valves or other implanted medical devices like a pacemaker, defibrillator, or continuous glucose monitor? No   16. Do you have artificial joints? No   17. Are you allergic to latex? No   18. Is there any chance that you may be pregnant? No       Health Care Directive:  Patient does not have a Health Care Directive or Living Will: Discussed advance care planning with patient; however, patient declined at this time.    Preoperative Review of :   reviewed - no record of controlled substances prescribed.      Status of Chronic Conditions:  See problem list for active medical problems.  Problems all longstanding and stable, except as noted/documented.  See ROS for pertinent symptoms related to these conditions.      Review of Systems  CONSTITUTIONAL: NEGATIVE for fever, chills, change in weight  ENT/MOUTH: NEGATIVE for ear, mouth and throat problems  RESP: NEGATIVE  for significant cough or SOB  CV: NEGATIVE for chest pain, palpitations or peripheral edema    Patient Active Problem List    Diagnosis Date Noted     Nuclear sclerotic cataract of both eyes 08/04/2020     Priority: Medium     Added automatically from request for surgery [id number]       Melanoma in situ of left lower extremity including hip (H) 09/09/2019     Priority: Medium     Mechanical limb problems 12/23/2015     Priority: Medium     Seasonal allergic rhinitis 11/06/2015     Priority: Medium     flonase and claritin- very helpful.  Nasal congestion, post-nasal drip, coughing, sore throat.       CMC arthritis, thumb, degenerative 11/03/2015     Priority: Medium     Varicose veins of legs 05/25/2012     Priority: Medium     Chuckie hose really helped- less swelling and discomfort at end of days, veins improved as well.       CARDIOVASCULAR SCREENING; LDL GOAL LESS THAN 160 10/31/2010     Priority: Medium     Mild persistent asthma      Priority: Medium     flovent daily helps (started in '12).  Triggers- allergies in Spring and URI's (really helps the prolonged coughing) but year-round sx's, so takes daily, rare albuterol       Factor 5 Leiden mutation, heterozygous (H)      Priority: Medium     Factor V Leiden positive  Has had superficial phlebitis        Past Medical History:   Diagnosis Date     Abnormal Pap smear 1990    Biopsy- ok     Anxiety      Blood dyscrasia     Factor 5 Leiden HTZ     Breast lump 09/27/2013     CMC arthritis, thumb, degenerative 11/03/2015     Congenital deficiency of other clotting factors     Factor V Leiden positive, had superficial thrombus, no DVT     Mild intermittent asthma with exacerbation     seasonally related, rare albuterol     Nonsenile cataract      Plantar fascia syndrome     L ft in '10, R ft in '11     Seasonal allergic rhinitis     claritin spring/August     Stress fracture of lower leg ~2002, 2009     Varicosities 2015    Varicose veins removed     Wrist fracture, left,  closed, initial encounter 06/02/2022     Past Surgical History:   Procedure Laterality Date     BIOPSY  1998 2000 2017    Skin tags moles cysts     COLONOSCOPY  2015    Polyp removed     MOHS MICROGRAPHIC PROCEDURE       PHACOEMULSIFICATION CLEAR CORNEA WITH TORIC INTRAOCULAR LENS IMPLANT Left 8/28/2020    Procedure: LEFT CATARACT REMOVAL WITH INTRAOCULAR TORIC LENS IMPLANT;  Surgeon: Dianna Knowles MD;  Location: [facility] OR     PHLEBECTOMY MULTIPLE STAB  10/15    [place] [facility], Dr. aRmirez     Current Outpatient Medications   Medication Sig Dispense Refill     albuterol (PROAIR HFA/PROVENTIL HFA/VENTOLIN HFA) 108 (90 Base) MCG/ACT inhaler Inhale 2 puffs into the lungs every 6 hours 18 g 0     Elastic Bandages & Supports (MEDICAL COMPRESSION SOCKS) MISC 1 Package daily Please measure and distribute 1 pair of 20mmHg - 30mmHg THIGH high open or closed toe compression stockings. Jobst ultrasheer or equivalent. 2 each 3     EPINEPHrine (ANY BX GENERIC EQUIV) 0.3 MG/0.3ML injection 2-pack Inject 0.3 mLs (0.3 mg) into the muscle once as needed for anaphylaxis 0.3 mL 3     famotidine (PEPCID) 20 MG tablet Take 20 mg by mouth 2 times daily       fexofenadine (ALLEGRA) 60 MG tablet        fluticasone (FLONASE) 50 MCG/ACT nasal spray Spray 2 sprays in nostril         Allergies   Allergen Reactions     Septra [Bactrim] Hives     Wasps [Hornets] Swelling     Significant leg swelling- epi pen rec in case worsening sx's        Social History     Tobacco Use     Smoking status: Never     Smokeless tobacco: Never   Vaping Use     Vaping status: Never Used   Substance Use Topics     Alcohol use: Yes     Alcohol/week: 1.0 standard drink of alcohol     Comment: 2 drinks per week     Family History   Problem Relation Age of Onset     Hypertension Mother         meds     Depression Mother      Cerebrovascular Disease Father      Circulatory Father         phlebitis, blood clots in leg veins     Alzheimer Disease Father         dx ~ in mid 60s      "Deep Vein Thrombosis (DVT) Father      Breast Cancer Maternal Grandmother         postmenopausal     Cancer Brother         pancreas,  age 25     Leukemia Brother         LGL     Genetic Disorder Brother      Other Cancer Brother         Pancreatic     Cancer Brother         Thymus Cancer--\"Goods Symdrone\"     Esophageal Cancer Brother      Psychotic Disorder Sister         ? depression, schizophrenia     Anesthesia Reaction No family hx of      History   Drug Use No         Objective     LMP 10/16/2015 (Exact Date)     Physical Exam  GENERAL APPEARANCE: healthy, alert and no distress  HENT: ear canals and TM's normal and nose and mouth without ulcers or lesions  RESP: lungs clear to auscultation - no rales, rhonchi or wheezes  CV: regular rate and rhythm, normal S1 S2, no S3 or S4 and no murmur, click or rub   ABDOMEN: soft, nontender, no HSM or masses and bowel sounds normal  NEURO: Normal strength and tone, sensory exam grossly normal, mentation intact and speech normal    Recent Labs   Lab Test 10/11/22  0735   HGB 13.9           Diagnostics:  No labs were ordered during this visit.   No EKG required for low risk surgery (cataract, skin procedure, breast biopsy, etc).    Revised Cardiac Risk Index (RCRI):  The patient has the following serious cardiovascular risks for perioperative complications:   - No serious cardiac risks = 0 points     RCRI Interpretation: 0 points: Class I (very low risk - 0.4% complication rate)           Signed Electronically by: SANDOR Ley CNP  Copy of this evaluation report is provided to requesting physician.      "

## 2023-05-11 ENCOUNTER — ANESTHESIA EVENT (OUTPATIENT)
Dept: SURGERY | Facility: HOSPITAL | Age: 58
End: 2023-05-11
Payer: COMMERCIAL

## 2023-05-12 ENCOUNTER — HOSPITAL ENCOUNTER (OUTPATIENT)
Facility: HOSPITAL | Age: 58
Discharge: HOME | End: 2023-05-12
Attending: INTERNAL MEDICINE | Admitting: INTERNAL MEDICINE
Payer: COMMERCIAL

## 2023-05-12 ENCOUNTER — ANESTHESIA (OUTPATIENT)
Dept: SURGERY | Facility: HOSPITAL | Age: 58
End: 2023-05-12
Payer: COMMERCIAL

## 2023-05-12 VITALS
RESPIRATION RATE: 16 BRPM | SYSTOLIC BLOOD PRESSURE: 155 MMHG | BODY MASS INDEX: 31.58 KG/M2 | WEIGHT: 201.6 LBS | HEART RATE: 63 BPM | OXYGEN SATURATION: 99 % | TEMPERATURE: 97.5 F | DIASTOLIC BLOOD PRESSURE: 74 MMHG

## 2023-05-12 LAB — UPPER GI ENDOSCOPY: NORMAL

## 2023-05-12 PROCEDURE — 88305 TISSUE EXAM BY PATHOLOGIST: CPT | Mod: TC | Performed by: INTERNAL MEDICINE

## 2023-05-12 PROCEDURE — 710N000012 HC RECOVERY PHASE 2, PER MINUTE: Performed by: INTERNAL MEDICINE

## 2023-05-12 PROCEDURE — 999N000141 HC STATISTIC PRE-PROCEDURE NURSING ASSESSMENT: Performed by: INTERNAL MEDICINE

## 2023-05-12 PROCEDURE — 250N000009 HC RX 250

## 2023-05-12 PROCEDURE — 250N000011 HC RX IP 250 OP 636: Performed by: NURSE ANESTHETIST, CERTIFIED REGISTERED

## 2023-05-12 PROCEDURE — 250N000011 HC RX IP 250 OP 636

## 2023-05-12 PROCEDURE — 370N000017 HC ANESTHESIA TECHNICAL FEE, PER MIN: Performed by: INTERNAL MEDICINE

## 2023-05-12 PROCEDURE — 258N000003 HC RX IP 258 OP 636: Performed by: ANESTHESIOLOGY

## 2023-05-12 PROCEDURE — 360N000075 HC SURGERY LEVEL 2, PER MIN: Performed by: INTERNAL MEDICINE

## 2023-05-12 PROCEDURE — 272N000001 HC OR GENERAL SUPPLY STERILE: Performed by: INTERNAL MEDICINE

## 2023-05-12 RX ORDER — PROPOFOL 10 MG/ML
INJECTION, EMULSION INTRAVENOUS CONTINUOUS PRN
Status: DISCONTINUED | OUTPATIENT
Start: 2023-05-12 | End: 2023-05-12

## 2023-05-12 RX ORDER — LIDOCAINE 40 MG/G
CREAM TOPICAL
Status: DISCONTINUED | OUTPATIENT
Start: 2023-05-12 | End: 2023-05-12 | Stop reason: HOSPADM

## 2023-05-12 RX ORDER — ONDANSETRON 2 MG/ML
INJECTION INTRAMUSCULAR; INTRAVENOUS PRN
Status: DISCONTINUED | OUTPATIENT
Start: 2023-05-12 | End: 2023-05-12

## 2023-05-12 RX ORDER — SODIUM CHLORIDE, SODIUM LACTATE, POTASSIUM CHLORIDE, CALCIUM CHLORIDE 600; 310; 30; 20 MG/100ML; MG/100ML; MG/100ML; MG/100ML
INJECTION, SOLUTION INTRAVENOUS CONTINUOUS
Status: DISCONTINUED | OUTPATIENT
Start: 2023-05-12 | End: 2023-05-12 | Stop reason: HOSPADM

## 2023-05-12 RX ORDER — PROPOFOL 10 MG/ML
INJECTION, EMULSION INTRAVENOUS PRN
Status: DISCONTINUED | OUTPATIENT
Start: 2023-05-12 | End: 2023-05-12

## 2023-05-12 RX ORDER — LIDOCAINE HYDROCHLORIDE 10 MG/ML
INJECTION, SOLUTION INFILTRATION; PERINEURAL PRN
Status: DISCONTINUED | OUTPATIENT
Start: 2023-05-12 | End: 2023-05-12

## 2023-05-12 RX ORDER — ONDANSETRON 2 MG/ML
4 INJECTION INTRAMUSCULAR; INTRAVENOUS
Status: DISCONTINUED | OUTPATIENT
Start: 2023-05-12 | End: 2023-05-12 | Stop reason: HOSPADM

## 2023-05-12 RX ORDER — DEXAMETHASONE SODIUM PHOSPHATE 10 MG/ML
INJECTION, SOLUTION INTRAMUSCULAR; INTRAVENOUS PRN
Status: DISCONTINUED | OUTPATIENT
Start: 2023-05-12 | End: 2023-05-12

## 2023-05-12 RX ADMIN — ONDANSETRON 4 MG: 2 INJECTION INTRAMUSCULAR; INTRAVENOUS at 08:13

## 2023-05-12 RX ADMIN — DEXAMETHASONE SODIUM PHOSPHATE 10 MG: 10 INJECTION, SOLUTION INTRAMUSCULAR; INTRAVENOUS at 08:13

## 2023-05-12 RX ADMIN — SODIUM CHLORIDE, POTASSIUM CHLORIDE, SODIUM LACTATE AND CALCIUM CHLORIDE: 600; 310; 30; 20 INJECTION, SOLUTION INTRAVENOUS at 07:38

## 2023-05-12 RX ADMIN — PROPOFOL 50 MG: 10 INJECTION, EMULSION INTRAVENOUS at 08:11

## 2023-05-12 RX ADMIN — PROPOFOL 50 MG: 10 INJECTION, EMULSION INTRAVENOUS at 08:14

## 2023-05-12 RX ADMIN — PROPOFOL 200 MCG/KG/MIN: 10 INJECTION, EMULSION INTRAVENOUS at 08:09

## 2023-05-12 RX ADMIN — LIDOCAINE HYDROCHLORIDE 5 ML: 10 INJECTION, SOLUTION INFILTRATION; PERINEURAL at 08:09

## 2023-05-12 ASSESSMENT — ACTIVITIES OF DAILY LIVING (ADL)
ADLS_ACUITY_SCORE: 35
ADLS_ACUITY_SCORE: 35

## 2023-05-12 NOTE — H&P
The History and Physical has been reviewed, the patient has been examined and no changes have occurred in the patient's condition since the H & P was completed.       Roderick Boss MD  [facility]  [phone]

## 2023-05-12 NOTE — ANESTHESIA PREPROCEDURE EVALUATION
Anesthesia Pre-Procedure Evaluation    Patient: Nkechi Manrique   MRN: [id number] : 1965        Procedure : Procedure(s):  ESOPHAGOGASTRODUODENOSCOPY MUCOSAL RESECTION UPPER          Past Medical History:   Diagnosis Date     Abnormal Pap smear     Biopsy- ok     Anxiety      Asthma      Blood dyscrasia     Factor 5 Leiden HTZ     Breast lump 2013     Bronchospasm      Cataract      CMC arthritis, thumb, degenerative 2015     Congenital deficiency of other clotting factors     Factor V Leiden positive, had superficial thrombus, no DVT     Factor 5 Leiden mutation, heterozygous (H)      Melanoma in situ of left lower extremity including hip (H)      Mild intermittent asthma with exacerbation     seasonally related, rare albuterol     Motion sickness      Nonsenile cataract      Plantar fascia syndrome     L ft in '10, R ft in      Polyp of stomach      Seasonal allergic rhinitis     claritin spring/August     Stress fracture of lower leg ~,      Varicosities 2015    Varicose veins removed     Wrist fracture, left, closed, initial encounter 2022      Past Surgical History:   Procedure Laterality Date     BIOPSY  1998    Skin tags moles cysts     COLONOSCOPY  2015    Polyp removed     MOHS MICROGRAPHIC PROCEDURE       PHACOEMULSIFICATION CLEAR CORNEA WITH TORIC INTRAOCULAR LENS IMPLANT Left 2020    Procedure: LEFT CATARACT REMOVAL WITH INTRAOCULAR TORIC LENS IMPLANT;  Surgeon: Dianna Knowles MD;  Location: [facility] OR     PHLEBECTOMY MULTIPLE STAB  10/15    [facility], Dr. Ramirez      Allergies   Allergen Reactions     Septra [Bactrim] Hives     Wasps [Hornets] Swelling     Significant leg swelling- epi pen rec in case worsening sx's      Social History     Tobacco Use     Smoking status: Never     Smokeless tobacco: Never   Vaping Use     Vaping status: Never Used   Substance Use Topics     Alcohol use: Yes     Alcohol/week: 1.0 standard drink of alcohol     Comment: 2  drinks per week      Wt Readings from Last 1 Encounters:   05/12/23 91.4 kg (201 lb 9.6 oz)        Anesthesia Evaluation   Pt has had prior anesthetic.         ROS/MED HX  ENT/Pulmonary:     (+) Intermittent, asthma     Neurologic:  - neg neurologic ROS     Cardiovascular:  - neg cardiovascular ROS     METS/Exercise Tolerance:     Hematologic:  - neg hematologic  ROS     Musculoskeletal:  - neg musculoskeletal ROS     GI/Hepatic:  - neg GI/hepatic ROS     Renal/Genitourinary:  - neg Renal ROS     Endo:  - neg endo ROS     Psychiatric/Substance Use:  - neg psychiatric ROS     Infectious Disease:  - neg infectious disease ROS     Malignancy:  - neg malignancy ROS     Other:  - neg other ROS          Physical Exam    Airway        Mallampati: III   TM distance: < 3 FB   Neck ROM: full   Mouth opening: > 3 cm    Respiratory Devices and Support         Dental       (+) Minor Abnormalities - some fillings, tiny chips      Cardiovascular   cardiovascular exam normal       Rhythm and rate: regular and normal     Pulmonary   pulmonary exam normal        breath sounds clear to auscultation           OUTSIDE LABS:  CBC:   Lab Results   Component Value Date    WBC 6.3 10/11/2022    WBC 8.6 06/29/2017    HGB 13.9 10/11/2022    HGB 13.7 06/29/2017    HCT 41.2 10/11/2022    HCT 41.2 06/29/2017     10/11/2022     06/29/2017     BMP:   Lab Results   Component Value Date     06/29/2017    POTASSIUM 4.0 06/29/2017    CHLORIDE 104 06/29/2017    CO2 26 06/29/2017    BUN 13 06/29/2017    CR 0.79 06/29/2017     (H) 10/11/2022    GLC 89 09/13/2021     COAGS:   Lab Results   Component Value Date    PTT 24 07/03/2007    INR 1.01 07/03/2007     POC: No results found for: BGM, HCG, HCGS  HEPATIC:   Lab Results   Component Value Date    ALBUMIN 4.0 06/29/2017    PROTTOTAL 7.7 06/29/2017    ALT 26 06/29/2017    AST 21 06/29/2017    ALKPHOS 77 06/29/2017    BILITOTAL 0.4 06/29/2017     OTHER:   Lab Results   Component  Value Date    KIM 9.2 06/29/2017    TSH 2.27 10/11/2022    T4 1.02 06/12/2019    T3 94 06/12/2019       Anesthesia Plan    ASA Status:  2   NPO Status:  NPO Appropriate    Anesthesia Type: MAC.     - Reason for MAC: straight local not clinically adequate              Consents    Anesthesia Plan(s) and associated risks, benefits, and realistic alternatives discussed. Questions answered and patient/representative(s) expressed understanding.    - Discussed:     - Discussed with:  Patient      - Patient is DNR/DNI Status: No         Postoperative Care    Pain management: Multi-modal analgesia.   PONV prophylaxis: Ondansetron (or other 5HT-3)     Comments:                Nico Murphy MD

## 2023-05-12 NOTE — ANESTHESIA CARE TRANSFER NOTE
Patient: Nkechi Manrique    Procedure: Procedure(s):  ESOPHAGOGASTRODUODENOSCOPY WITHSNARE POLYPECTOMY AND CLIPPING.       Diagnosis: Gastric polyp [K31.7]  Diagnosis Additional Information: No value filed.    Anesthesia Type:   MAC     Note:    Oropharynx: oropharynx clear of all foreign objects and spontaneously breathing  Level of Consciousness: awake  Oxygen Supplementation: room air    Independent Airway: airway patency satisfactory and stable  Dentition: dentition unchanged  Vital Signs Stable: post-procedure vital signs reviewed and stable  Report to RN Given: handoff report given  Patient transferred to: Phase II    Handoff Report: Identifed the Patient, Identified the Reponsible Provider, Reviewed the pertinent medical history, Discussed the surgical course, Reviewed Intra-OP anesthesia mangement and issues during anesthesia, Set expectations for post-procedure period and Allowed opportunity for questions and acknowledgement of understanding      Vitals:  Vitals Value Taken Time   /86 05/12/23 0837   Temp 97.8 05/12/23 0837   Pulse 66 05/12/23 0837   Resp 14 05/12/23 0837   SpO2 99 05/12/23 0837       Electronically Signed By: SANDOR Alonso CRNA  May 12, 2023  8:37 AM

## 2023-05-12 NOTE — ANESTHESIA POSTPROCEDURE EVALUATION
Patient: Nkechi Manrique    Procedure: Procedure(s):  ESOPHAGOGASTRODUODENOSCOPY WITHSNARE POLYPECTOMY AND CLIPPING.       Anesthesia Type:  MAC    Note:  Disposition: Outpatient   Postop Pain Control: Uneventful            Sign Out: Well controlled pain   PONV: No   Neuro/Psych: Uneventful            Sign Out: Acceptable/Baseline neuro status   Airway/Respiratory: Uneventful            Sign Out: Acceptable/Baseline resp. status   CV/Hemodynamics: Uneventful            Sign Out: Acceptable CV status; No obvious hypovolemia; No obvious fluid overload   Other NRE: NONE   DID A NON-ROUTINE EVENT OCCUR? No           Last vitals:  Vitals Value Taken Time   /83 05/12/23 0900   Temp 36.4  C (97.5  F) 05/12/23 0845   Pulse 60 05/12/23 0907   Resp 16 05/12/23 0845   SpO2 99 % 05/12/23 0907   Vitals shown include unvalidated device data.    Electronically Signed By: Nico Murphy MD  May 12, 2023  9:09 AM

## 2023-05-15 LAB
PATH REPORT.COMMENTS IMP SPEC: NORMAL
PATH REPORT.COMMENTS IMP SPEC: NORMAL
PATH REPORT.FINAL DX SPEC: NORMAL
PATH REPORT.GROSS SPEC: NORMAL
PATH REPORT.MICROSCOPIC SPEC OTHER STN: NORMAL
PATH REPORT.RELEVANT HX SPEC: NORMAL
PHOTO IMAGE: NORMAL

## 2023-05-15 PROCEDURE — 88305 TISSUE EXAM BY PATHOLOGIST: CPT | Mod: 26 | Performed by: PATHOLOGY

## 2023-06-15 ENCOUNTER — TRANSFERRED RECORDS (OUTPATIENT)
Dept: HEALTH INFORMATION MANAGEMENT | Facility: CLINIC | Age: 58
End: 2023-06-15
Payer: COMMERCIAL

## 2023-11-27 ENCOUNTER — OFFICE VISIT (OUTPATIENT)
Dept: PEDIATRICS | Facility: CLINIC | Age: 58
End: 2023-11-27
Attending: NURSE PRACTITIONER
Payer: COMMERCIAL

## 2023-11-27 VITALS
RESPIRATION RATE: 16 BRPM | DIASTOLIC BLOOD PRESSURE: 72 MMHG | HEART RATE: 77 BPM | SYSTOLIC BLOOD PRESSURE: 112 MMHG | BODY MASS INDEX: 31.47 KG/M2 | WEIGHT: 200.5 LBS | HEIGHT: 67 IN | TEMPERATURE: 97.8 F | OXYGEN SATURATION: 96 %

## 2023-11-27 DIAGNOSIS — K21.00 GASTROESOPHAGEAL REFLUX DISEASE WITH ESOPHAGITIS WITHOUT HEMORRHAGE: ICD-10-CM

## 2023-11-27 DIAGNOSIS — N95.2 VAGINAL ATROPHY: ICD-10-CM

## 2023-11-27 DIAGNOSIS — Z00.00 ROUTINE GENERAL MEDICAL EXAMINATION AT A HEALTH CARE FACILITY: Primary | ICD-10-CM

## 2023-11-27 DIAGNOSIS — D03.72 MELANOMA IN SITU OF LEFT LOWER EXTREMITY INCLUDING HIP (H): ICD-10-CM

## 2023-11-27 DIAGNOSIS — T63.441A ALLERGIC REACTION TO BEE STING: ICD-10-CM

## 2023-11-27 DIAGNOSIS — Z12.4 CERVICAL CANCER SCREENING: ICD-10-CM

## 2023-11-27 DIAGNOSIS — J45.30 MILD PERSISTENT ASTHMA WITHOUT COMPLICATION: ICD-10-CM

## 2023-11-27 DIAGNOSIS — I83.93 VARICOSE VEINS OF BOTH LOWER EXTREMITIES, UNSPECIFIED WHETHER COMPLICATED: ICD-10-CM

## 2023-11-27 DIAGNOSIS — M72.2 PLANTAR FASCIITIS: ICD-10-CM

## 2023-11-27 PROCEDURE — 99213 OFFICE O/P EST LOW 20 MIN: CPT | Mod: 25 | Performed by: NURSE PRACTITIONER

## 2023-11-27 PROCEDURE — 99396 PREV VISIT EST AGE 40-64: CPT | Performed by: NURSE PRACTITIONER

## 2023-11-27 PROCEDURE — G0145 SCR C/V CYTO,THINLAYER,RESCR: HCPCS | Performed by: NURSE PRACTITIONER

## 2023-11-27 PROCEDURE — 87624 HPV HI-RISK TYP POOLED RSLT: CPT | Performed by: NURSE PRACTITIONER

## 2023-11-27 RX ORDER — ALBUTEROL SULFATE 90 UG/1
2 AEROSOL, METERED RESPIRATORY (INHALATION) EVERY 6 HOURS
Qty: 18 G | Refills: 0 | Status: SHIPPED | OUTPATIENT
Start: 2023-11-27

## 2023-11-27 RX ORDER — ESTRADIOL 0.1 MG/G
2 CREAM VAGINAL
Qty: 42.5 G | Refills: 11 | Status: SHIPPED | OUTPATIENT
Start: 2023-11-27

## 2023-11-27 RX ORDER — EPINEPHRINE 0.3 MG/.3ML
0.3 INJECTION SUBCUTANEOUS
Qty: 0.3 ML | Refills: 3 | Status: SHIPPED | OUTPATIENT
Start: 2023-11-27

## 2023-11-27 ASSESSMENT — ENCOUNTER SYMPTOMS
HEMATOCHEZIA: 0
ABDOMINAL PAIN: 0
DIARRHEA: 0
HEMATURIA: 0
PARESTHESIAS: 0
NAUSEA: 0
CONSTIPATION: 0
DIZZINESS: 0
BREAST MASS: 0
NERVOUS/ANXIOUS: 0
DYSURIA: 0
FREQUENCY: 0
SORE THROAT: 0
COUGH: 1
SHORTNESS OF BREATH: 0
WEAKNESS: 0
CHILLS: 0
EYE PAIN: 0
FEVER: 0
PALPITATIONS: 0
JOINT SWELLING: 0
ARTHRALGIAS: 1
HEARTBURN: 0
MYALGIAS: 0
HEADACHES: 0

## 2023-11-27 ASSESSMENT — ASTHMA QUESTIONNAIRES: ACT_TOTALSCORE: 23

## 2023-11-27 ASSESSMENT — PAIN SCALES - GENERAL: PAINLEVEL: MILD PAIN (2)

## 2023-11-27 NOTE — PROGRESS NOTES
SUBJECTIVE:   Nkechi is a 57 year old, presenting for the following:  Physical        11/27/2023     8:45 AM   Additional Questions   Roomed by Lora Kenyon CMA       Healthy Habits:     Getting at least 3 servings of Calcium per day:  Yes    Bi-annual eye exam:  Yes    Dental care twice a year:  Yes    Sleep apnea or symptoms of sleep apnea:  None    Diet:  Regular (no restrictions)    Frequency of exercise:  4-5 days/week    Duration of exercise:  30-45 minutes    Taking medications regularly:  Yes    Medication side effects:  Not applicable    Additional concerns today:  Yes  Concerns today: plantar fascitis  NOT fasting today      The 10-year ASCVD risk score (Jeyson MARC, et al., 2019) is: 1.6%    Values used to calculate the score:      Age: 57 years      Sex: Female      Is Non- : No      Diabetic: No      Tobacco smoker: No      Systolic Blood Pressure: 112 mmHg      Is BP treated: No      HDL Cholesterol: 94 mg/dL      Total Cholesterol: 245 mg/dL    Plater faaciits, restarted running, doesn't bother her running, but does day to day. Wearing quality soled shoes. Would like to go to [facility].     History of asthma, symptoms had worsened because of gerd, had a polypectomy and upper endoscopy, notes symptoms gerd controlled per meds, asthma symptoms not triggered currently.       10/11/2022     7:50 AM 4/18/2023     8:44 AM 11/27/2023     8:42 AM   ACT Total Scores   ACT TOTAL SCORE (Goal Greater than or Equal to 20) 25 24 23   In the past 12 months, how many times did you visit the emergency room for your asthma without being admitted to the hospital? 0 0 0   In the past 12 months, how many times were you hospitalized overnight because of your asthma? 0 0 0     Can have vag dryness.     Social History     Tobacco Use    Smoking status: Never    Smokeless tobacco: Never   Substance Use Topics    Alcohol use: Yes     Alcohol/week: 1.0 standard drink of alcohol     Comment: 2 drinks per  week             11/27/2023     8:41 AM   Alcohol Use   Prescreen: >3 drinks/day or >7 drinks/week? No     Reviewed orders with patient.  Reviewed health maintenance and updated orders accordingly - No  Lab work is in process    Breast Cancer Screening:    FHS-7:       3/21/2022     3:58 PM 3/23/2023     2:39 PM   Breast CA Risk Assessment (FHS-7)   Did any of your first-degree relatives have breast or ovarian cancer? No No   Did any of your relatives have bilateral breast cancer? No No   Did any man in your family have breast cancer? No No   Did any woman in your family have breast and ovarian cancer? No No   Did any woman in your family have breast cancer before age 50 y? No No   Do you have 2 or more relatives with breast and/or ovarian cancer? No No   Do you have 2 or more relatives with breast and/or bowel cancer? No No     Mammogram Screening: Recommended mammography every 1-2 years with patient discussion and risk factor consideration  Pertinent mammograms are reviewed under the imaging tab.    History of abnormal Pap smear: NO - age 30-65 PAP every 5 years with negative HPV co-testing recommended      Latest Ref Rng & Units 10/31/2018     2:21 PM 9/27/2013    12:00 AM 3/16/2010    12:00 AM   PAP / HPV   PAP (Historical)  NIL  NIL  NIL    HPV 16 DNA NEG^Negative Negative      HPV 18 DNA NEG^Negative Negative      Other HR HPV NEG^Negative Negative        Reviewed and updated as needed this visit by clinical staff   Tobacco  Allergies  Meds              Reviewed and updated as needed this visit by Provider                     Review of Systems   Constitutional:  Negative for chills and fever.   HENT:  Negative for congestion, ear pain, hearing loss and sore throat.    Eyes:  Negative for pain and visual disturbance.   Respiratory:  Positive for cough. Negative for shortness of breath.    Cardiovascular:  Negative for chest pain, palpitations and peripheral edema.   Gastrointestinal:  Negative for abdominal  "pain, constipation, diarrhea, heartburn, hematochezia and nausea.   Breasts:  Negative for tenderness, breast mass and discharge.   Genitourinary:  Negative for dysuria, frequency, genital sores, hematuria, pelvic pain, urgency, vaginal bleeding and vaginal discharge.   Musculoskeletal:  Positive for arthralgias. Negative for joint swelling and myalgias.   Skin:  Negative for rash.   Neurological:  Negative for dizziness, weakness, headaches and paresthesias.   Psychiatric/Behavioral:  Negative for mood changes. The patient is not nervous/anxious.         OBJECTIVE:   /72 (BP Location: Right arm, Cuff Size: Adult Large)   Pulse 77   Temp 97.8  F (36.6  C) (Tympanic)   Resp 16   Ht 1.702 m (5' 7\")   Wt 90.9 kg (200 lb 8 oz)   LMP 10/16/2015 (Exact Date)   SpO2 96%   BMI 31.40 kg/m    Physical Exam  GENERAL: healthy, alert and no distress  EYES: Eyes grossly normal to inspection, PERRL and conjunctivae and sclerae normal  HENT: ear canals and TM's normal, nose and mouth without ulcers or lesions  NECK: no adenopathy, no asymmetry, masses, or scars and thyroid normal to palpation  RESP: lungs clear to auscultation - no rales, rhonchi or wheezes  CV: regular rate and rhythm, normal S1 S2, no S3 or S4, no murmur, click or rub, no peripheral edema and peripheral pulses strong  ABDOMEN: soft, nontender, no hepatosplenomegaly, no masses and bowel sounds normal   (female): normal female external genitalia, normal urethral meatus, vaginal mucosa, normal cervix/adnexa/uterus without masses or discharge  MS: no gross musculoskeletal defects noted, no edema      ASSESSMENT/PLAN:   (Z00.00) Routine general medical examination at a health care facility  (primary encounter diagnosis)  Comment:   Plan:     (Z12.4) Cervical cancer screening  Comment:   Plan: Pap Screen with HPV - recommended age 30 - 65         years          (T63.441A) Allergic reaction to bee sting  Comment: no cocnersn  Plan: EPINEPHrine (ANY BX " "GENERIC EQUIV) 0.3 MG/0.3ML        injection 2-pack          (J45.30) Mild persistent asthma without complication  Comment: stable, no concerns  Plan:     (D03.72) Melanoma in situ of left lower extremity including hip (H)  Comment: conitnue follow-up with derm  Plan:     (M72.2) Plantar fasciitis  Comment: will refer to ortho  Plan: Orthopedic  Referral          (K21.00) Gastroesophageal reflux disease with esophagitis without hemorrhage  Comment: reviewd last EGD, will continue pepcid  Plan:     (N95.2) Vaginal atrophy  Comment: treatemnt reviewed, follow-up prn  Plan: estradiol (ESTRACE) 0.1 MG/GM vaginal cream          (I83.93) Varicose veins of both lower extremities, unspecified whether complicated  Comment: was told to follow-up with vascular med over winter due to compression socks, will follow-up with them  Plan: Vascular Medicine Referral                COUNSELING:  Reviewed preventive health counseling, as reflected in patient instructions  Special attention given to:        Regular exercise       Healthy diet/nutrition      BMI:   Estimated body mass index is 31.4 kg/m  as calculated from the following:    Height as of this encounter: 1.702 m (5' 7\").    Weight as of this encounter: 90.9 kg (200 lb 8 oz).   Weight management plan: Discussed healthy diet and exercise guidelines      She reports that she has never smoked. She has never used smokeless tobacco.          Tawny Colunga, [facility] [place]  "

## 2023-11-29 LAB
BKR LAB AP GYN ADEQUACY: NORMAL
BKR LAB AP GYN INTERPRETATION: NORMAL
BKR LAB AP HPV REFLEX: NORMAL
BKR LAB AP PREVIOUS ABNORMAL: NORMAL
PATH REPORT.COMMENTS IMP SPEC: NORMAL
PATH REPORT.COMMENTS IMP SPEC: NORMAL
PATH REPORT.RELEVANT HX SPEC: NORMAL

## 2023-11-30 LAB
HUMAN PAPILLOMA VIRUS 16 DNA: NEGATIVE
HUMAN PAPILLOMA VIRUS 18 DNA: NEGATIVE
HUMAN PAPILLOMA VIRUS FINAL DIAGNOSIS: NORMAL
HUMAN PAPILLOMA VIRUS OTHER HR: NEGATIVE

## 2023-12-03 ENCOUNTER — TRANSFERRED RECORDS (OUTPATIENT)
Dept: HEALTH INFORMATION MANAGEMENT | Facility: CLINIC | Age: 58
End: 2023-12-03
Payer: COMMERCIAL

## 2023-12-11 NOTE — MR AVS SNAPSHOT
After Visit Summary   6/28/2017    Nkechi Manrique    MRN: [id number]           Patient Information     Date Of Birth          1965        Visit Information        Provider Department      6/28/2017 4:00 PM Katherine Angela MD [facility]         Today's Diagnoses     Weight gain    -  1    Allergic reaction to bee sting          Care Instructions      Preventive Health Recommendations  Female Ages 50 - 64    Yearly exam: See your health care provider every year in order to  o Review health changes.   o Discuss preventive care.    o Review your medicines if your doctor has prescribed any.      Get a Pap test every three years (unless you have an abnormal result and your provider advises testing more often).    If you get Pap tests with HPV test, you only need to test every 5 years, unless you have an abnormal result.     You do not need a Pap test if your uterus was removed (hysterectomy) and you have not had cancer.    You should be tested each year for STDs (sexually transmitted diseases) if you're at risk.     Have a mammogram every 1 to 2 years.    Have a colonoscopy at age 50, or have a yearly FIT test (stool test). These exams screen for colon cancer.      Have a cholesterol test every 5 years, or more often if advised.    Have a diabetes test (fasting glucose) every three years. If you are at risk for diabetes, you should have this test more often.     If you are at risk for osteoporosis (brittle bone disease), think about having a bone density scan (DEXA).    Shots: Get a flu shot each year. Get a tetanus shot every 10 years.    Nutrition:     Eat at least 5 servings of fruits and vegetables each day.    Eat whole-grain bread, whole-wheat pasta and brown rice instead of white grains and rice.    Talk to your provider about Calcium and Vitamin D.     Lifestyle    Exercise at least 150 minutes a week (30 minutes a day, 5 days a week). This will help you control  Work in today at 1pm.   RRJ   your weight and prevent disease.    Limit alcohol to one drink per day.    No smoking.     Wear sunscreen to prevent skin cancer.     See your dentist every six months for an exam and cleaning.    See your eye doctor every 1 to 2 years.            Follow-ups after your visit        Your next 10 appointments already scheduled     Jul 05, 2017 11:00 AM CDT   Return Visit with Kellie Granados, PhD SHANNON   [facility]  ([facility])    [place] [facility]  [address] [facility] [id number] [phone]            Jul 19, 2017  2:00 PM CDT   Return Visit with Kellie Granados, PhD SHANNON   [facility]  ([facility])    [place] [facility]  [address]  [address] [facility] [id number] [phone]            Aug 11, 2017  3:45 PM CDT   New Patient Visit with Maria C Walsh MD   [facility] ([facility])    [place] [facility] [address]  [address]  [address] [facility] [id number] [phone]              Who to contact     Please call your clinic at [phone] to:    Ask questions about your health    Make or cancel appointments    Discuss your medicines    Learn about your test results    Speak to your doctor   If you have compliments or concerns about an experience at your clinic, or if you wish to file a complaint, please contact [facility] Patient Relations at [phone] or email us at [email or web address]         Additional Information About Your Visit        MyChart Information     [employer] gives you secure access to your electronic health record. If you see a primary care provider, you can also send messages to your care team and make appointments. If you have questions, please call your primary care clinic.  If you do not have a primary care provider, please call [phone] and they will assist you.    "   [employer] is an electronic gateway that provides easy, online access to your medical records. With [employer], you can request a clinic appointment, read your test results, renew a prescription or communicate with your care team.     To access your existing account, please contact your [facility] or call [phone] for assistance.        Care EveryWhere ID     This is your Care EveryWhere ID. This could be used by other organizations to access your [facility] medical records  [id number]        Your Vitals Were     Pulse Height Last Period Breastfeeding? BMI (Body Mass Index)       80 1.727 m (5' 8\") 10/16/2015 (Exact Date) No 29.85 kg/m2        Blood Pressure from Last 3 Encounters:   06/28/17 110/72   11/06/15 117/68   10/28/14 92/60    Weight from Last 3 Encounters:   06/28/17 89 kg (196 lb 4.8 oz)   11/02/15 79.4 kg (175 lb)   10/28/14 79.4 kg (175 lb)                 Today's Medication Changes          These changes are accurate as of: 6/28/17 11:59 PM.  If you have any questions, ask your nurse or doctor.               Stop taking these medicines if you haven't already. Please contact your care team if you have questions.     CALCIUM 500 + D 500-200 MG-IU Tabs   Stopped by:  Katherine Angela MD           fluticasone 110 MCG/ACT Inhaler   Commonly known as:  FLOVENT HFA   Stopped by:  Katherine Angela MD           omega 3 1000 MG Caps   Stopped by:  Katherine Angela MD           VITAMIN C PO   Stopped by:  Katherine Angela MD           vitamin D 2000 UNITS Caps   Stopped by:  Katherine Angela MD                Where to get your medicines      These medications were sent to [facility] [address] [address] [facility] [address] [facility] [address]  [address] [facility] [address], [facility] [address]     Phone:  [phone]     EPINEPHrine 0.3 MG/0.3ML injection                Primary Care Provider Office Phone # Fax #    Dinorah Alonso, " [phone]       [facility] [address] [facility]  [address]  [facility] [address]        Equal Access to Services     KAREN DAVID : Hadii aad ku hadasaf Quickali, waelisa papiqodilon, minesh kaalmada faviola, key baca flakitadevi sanchez laMirzamamadou hinson. [facility] [phone].    ATENCIÓN: Si habla español, tiene a phelps disposición servicios gratuitos de asistencia lingüística. Llame al [phone].    We comply with applicable federal civil rights laws and [place] laws. We do not discriminate on the basis of race, color, national origin, age, disability sex, sexual orientation or gender identity.            Thank you!     Thank you for choosing [facility]   for your care. Our goal is always to provide you with excellent care. Hearing back from our patients is one way we can continue to improve our services. Please take a few minutes to complete the written survey that you may receive in the mail after your visit with us. Thank you!             Your Updated Medication List - Protect others around you: Learn how to safely use, store and throw away your medicines at www.disposemymeds.org.          This list is accurate as of: 6/28/17 11:59 PM.  Always use your most recent med list.                   Brand Name Dispense Instructions for use Diagnosis    albuterol 108 (90 BASE) MCG/ACT Inhaler    albuterol    2 Inhaler    Inhale 1-2 puffs into the lungs every 6 hours as needed for shortness of breath / dyspnea    Mild persistent asthma without complication       CLARITIN PO      1 TABLET DAILY        EPINEPHrine 0.3 MG/0.3ML injection     0.3 mL    Inject 0.3 mLs (0.3 mg) into the muscle once as needed for anaphylaxis    Allergic reaction to bee sting       fluticasone 50 MCG/ACT spray    FLONASE     Spray 2 sprays into both nostrils daily        ketoprofen 10% in PLO 10% topical gel     30 g    Apply a pea-sized amount to the CMC joint on the Right    CMC arthritis        mometasone-formoterol 200-5 MCG/ACT oral inhaler    DULERA     Inhale 2 puffs into the lungs 2 times daily        triamcinolone 0.1 % cream    KENALOG    80 g    Apply sparingly to affected area three times daily as needed for 1-2 weeks    Eczema, unspecified type       vitamin B complex with vitamin C Tabs tablet      Take 1 tablet by mouth daily

## 2023-12-16 ENCOUNTER — MYC REFILL (OUTPATIENT)
Dept: PEDIATRICS | Facility: CLINIC | Age: 58
End: 2023-12-16
Payer: COMMERCIAL

## 2023-12-16 DIAGNOSIS — N95.2 VAGINAL ATROPHY: ICD-10-CM

## 2023-12-18 RX ORDER — ESTRADIOL 0.1 MG/G
2 CREAM VAGINAL
Qty: 42.5 G | Refills: 11 | OUTPATIENT
Start: 2023-12-18

## 2024-01-18 ENCOUNTER — OFFICE VISIT (OUTPATIENT)
Dept: VASCULAR SURGERY | Facility: CLINIC | Age: 59
End: 2024-01-18
Attending: NURSE PRACTITIONER
Payer: COMMERCIAL

## 2024-01-18 DIAGNOSIS — I83.812 VARICOSE VEINS OF LEFT LOWER EXTREMITY WITH PAIN: Primary | ICD-10-CM

## 2024-01-18 DIAGNOSIS — I83.93 VARICOSE VEINS OF BOTH LOWER EXTREMITIES, UNSPECIFIED WHETHER COMPLICATED: ICD-10-CM

## 2024-01-18 PROCEDURE — 99203 OFFICE O/P NEW LOW 30 MIN: CPT | Performed by: SURGERY

## 2024-01-18 NOTE — LETTER
1/18/2024         RE: Nkechi Manrique  [address] [facility] [address]        Dear Colleague,    Thank you for referring your patient, Nkechi Manrique, to the [facility] [place]. Please see a copy of my visit note below.    I had the pleasure of seeing Nkechi Manrique in the vein clinic today.  She is a very pleasant 58-year-old female who comes to us with discomfort in the left lower extremity.  She tells me that she has a prominent varicose vein in the left lower extremity and in particular behind the left knee.  Over the years she has been wearing compression stockings to mitigate her symptoms.    Over the past few months they have been getting increasingly symptomatic.  She experiences pain through the course of the day and spite of her wearing compression stockings.  She also experiences throbbing and warmth and itching.  This has not started interfering with her activities of day-to-day living as well as her job.  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    She has never had thrombophlebitis.    She has increased her activity level and is doing intermittent leg elevation as well.  However this continues to be a day today issue.    Her mother also had varicose veins.    She has never had any children.    She also notices some spider veins particularly on the lateral aspect of the right lower thigh and in the left medial malleolus area.    On examination: She appears comfortable and she is in no acute distress.  There is a cluster of large ropey varicosities in the left lateral leg and another superficial cluster behind the left knee.  I do not see any evidence of phlebitis.  There are areas of spider veins in the left skin and the left lateral lower thigh.    Diagnosis:  1.  Symptomatic, lifestyle limiting left lower extremity varicose veins with failure of conservative management.  2.  Asymptomatic bilateral lower extremity spider veins.    Plan: I  explained the pathophysiology of disease to her in detail.  We will get sonography of the left lower extremity and decide on a course of action given the amount of discomfort and interference with her day-to-day living and her work she is having.  2.  The spider veins will have to be treated on a cosmetic basis.    I will see her back with her sonography of the left lower extremity.      Again, thank you for allowing me to participate in the care of your patient.        Sincerely,        Noé Swanson MD

## 2024-01-18 NOTE — PATIENT INSTRUCTIONS
Sclerotherapy: Pre-Treatment Instructions    Recommended Sessions:  1-2 treatment sessions    Pricing: Full session - $407                 *Payment is due at the time of visit following the treatment    Time Required per Treatment Session - About 45 minutes  Please come in 15 minutes before your scheduled appointment.  30 min.  Sclerotherapy treatments last approximately 30 minutes.  5 min.    A staff member will wipe your legs off with warm water and dry them with a wash cloth.                 Then you can put your compression hose on, get dressed and check out.  10 min.  After your treatment, you will be asked to walk around for 10 minutes before you get in your car.    Medications  Five days before your appointment, discontinue aspirin (Bufferin, Anacin, etc.) and Ibuprofen (Motrin, Advil, Aleve, etc.) to reduce bruising. Resume these medications the day following the treatment.    Leg Preparation  Do not shave your legs or apply any oil, lotion or powder the night before or the day of your treatment.    Clothing  Shorts:  Bring a pair of loose, comfortable shorts to wear during your treatment (or you can choose to wear ours). Shoes: Bring comfortable shoes to accommodate the compression hose after your treatment. Do not wear flip-flops or thong-style sandals unless you have open-toe compression hose.    Photographs  Photos will be taken before each treatment. This helps monitor your progress.    Injections  The physician will inject your veins with the sclerotherapy solution chosen to meet your specific needs.    Compression Hose  Please bring your compression hose if you have them. They may also be reserved for you at our clinic. Compression hose must be worn immediately after each sclerotherapy treatment.  The hose must be compression level 20-30, and they must be worn for 24 hours straight after your treatment.  If you have never worn compression hose before, a staff member will teach you how to put them  on.             You cannot have a treatment without compression hose.               They are critical to the success of your treatment!    You may purchase your compression hose from us. We will measure you and have the hose available when you come for your treatment.    Cancellation and Rescheduling  If you need to cancel or reschedule your sclerotherapy treatment, please give our office at least 24 hour notice.    Sclerotherapy: Basic Information    What is sclerotherapy?  Sclerotherapy is a treatment for  spider  veins.  Spider  veins are small veins just under your skin that can look red, blue or purple. Most  spider  veins are only a cosmetic problem.  Spider  veins are not useful and treating them will not affect your circulation.    How does sclerotherapy work?  1.  Injections: A very small needle is used to inject a solution into the  spider  veins. The solution irritates the cells that line the vein walls. This causes the veins to collapse. The vein walls to stick together and they can no longer carry blood. Different solutions are used based on the size of the veins.  2.  Compression:  The spider veins are kept collapsed by wearing compression stockings. Your body will break down and absorb the treated veins. You wear the compression hose for 24 hours after the treatment and then for 4 more days during your waking hours only.    How does the body heal after sclerotherapy?  The process is similar to how your body heals after a bad bruise. It takes 4-6 weeks or more for the healing to be complete. When the healing is complete, the vein is no longer visible. It may take more than one treatment.    How do I get the best results?  It is important to follow the post-sclerotherapy instructions. The best results require time and patience. The injection sites will continue to heal and fade for months after a treatment. Please discuss your expectations with your doctor to keep them realistic. Your doctor will do  everything possible to meet or exceed your expectations.    How many treatments are needed?  After your initial exam, your doctor will give you an estimate of the number of treatments that may be required. It depends upon the size, type, and quantity of your  spider  veins and on the doctor's assessment, your history and expectations. You may end up needing fewer or more treatments.    How soon can I have another treatment?  Additional treatments are scheduled every 4-6 weeks to allow time for the body to respond to the previous treatment.    Common Side Effects:  Itching  The areas that were injected may itch. This is usually mild and lasts less than a day. Do not use lotions or creams on your legs until the injection sites have healed over.    Pain  It is common to have some tenderness at the injection sites. Injection of the solution can be uncomfortable, but is usually well tolerated by most patients. The tenderness is temporary, lasting 24 hours at most. Tylenol or Ibuprofen can be used, if needed, following the product directions.    Bruising  This may occur at the injections sites. Bruising may be minimized by avoiding Aspirin and Ibuprofen products for five days before each treatment session.    Hyperpigmentation  A light brown discoloration of the skin may develop along the veins in the areas injected. Approximately 20-30% of patients treated note the discoloration (which is lighter and less obvious than the veins that are being treated). The hyperpigmentation usually fades in a couple of weeks, but may take several months to a year to totally resolve. There is a 1% chance of hyperpigmentation continuing after one year.    Trapped blood  A small amount of blood may become trapped and hardened in the veins. This may feel like a knot or cord and it may look dark blue or bruised. This is a common occurrence. You may need to return before your next treatment so this area can be drained to remove the trapped  blood. This will reduce the hyperpigmentation that can occur. The chance of this occurring can be decreased with proper use of compression hose after your treatment.    Matting  Matting is the formation of new, fine  spider  veins in the area injected.  It occurs in approximately 10% of patients injected. The exact reason for this is unknown. If untreated, the matting usually resolves in 3 to 12 months, but very rarely, it can be permanent. If the matting does not fade, it can be re-injected.    Rare Side Effects:  Ulceration at injection sites  Very rarely, a small ulcer will occur at the site where a vein is injected. An ulcer can take 4 to 6 weeks to completely heal. A small scar may result.    Allergic reactions  There is a very rare incidence of an allergic reaction to the solution injected. You will be observed for such reaction and will be treated appropriately should it occur. Please inform us of any allergy history.    Pulmonary embolus/Deep vein thrombosis  This is a blood clot which moves to the lungs/a blood clot in the deep vein system. There is an extraordinarily low incidence of this complication.      SCLEROTHERAPY AFTER CARE  Immediately:  After treatment, walk for 10-15 minutes before getting in your car.  If your trip home is more than 1 hour, stop and walk around for 5-10 minutes. Avoid sitting or standing for extended periods.   First 24 hours: Wear your compression continuously, even while in bed. After the 24 hours, you may shower if you want to. Put your hose back on, unless you are going to bed. You should NOT wear compression to bed after the first 24 hours. You may fly the next day, but wear your compression.   For 5 days: Wear the compression hose for waking hours only. You may continue to wear them longer than 5 days if you prefer.   For days 5-7: Walking is encouraged, as it promotes efficient circulation in your veins. You may do activities that raise your heart rate, but do NOT run,  jog, do high impact aerobics, or weight lifting. After 7 days, no activity restrictions.  Shaving: Wait a few days to shave or apply lotion.   Bathing: Do NOT take hot baths or sit in a hot tub for 7-10 days.    For 1 year: Wear SPF 30 sunscreen on your legs when in the sun. This is very important! It helps prevent darkening of the skin at the injection sites.   Medications: You may resume your usual medications, including aspirin or ibuprofen.    Common Things to Expect       Compression must be worn for the first 24 hours and then during the day for 5-7 days.    If larger veins are treated with ultrasound-guided sclerotherapy, you will have redness, firmness, tenderness, and swelling.  This firmness and tenderness may take 3-6 months to resolve. Ibuprofen and compression hose will aid in this process.    You will have bruising that can last up to 3 weeks. Most fading of the veins will occur between 3 and 6 weeks after treatment.    You may notice brown discoloration (hyperpigmentation) at the treatment site.  This should fade with time, but will take 3 months to 1 year to fully heal.     Some treated veins may look darker because of trapped blood within the vein. This trapped blood can be removed at a minimum of 1 month following treatment. Larger veins are more likely to develop trapped blood.    It is very important for you to use at least SPF 30 sunscreen in order to help prevent the discoloration of your skin.    Migraines rarely occur following sclerotherapy, but are more likely in patients with a history of migraines.  Treat as you would any other migraine.

## 2024-01-18 NOTE — NURSING NOTE
Patient Reported symptoms:    Right leg   Heaviness Some of the time   Achiness Some of the time   Swelling A little of the time   Throbbing A little of the time   Itching None of the time   Appearance Moderately noticeable   Impact on work/activities Mildly reduced     Left Leg   Heaviness A good bit of the time   Achiness A good bit of the time   Swelling A little of the time   Throbbing A good bit of the time   Itching None of the time   Appearance Moderately noticeable   Impact on work/activities Mildly reduced

## 2024-01-18 NOTE — PROGRESS NOTES
I had the pleasure of seeing Nkechi Manrique in the vein clinic today.  She is a very pleasant 58-year-old female who comes to us with discomfort in the left lower extremity.  She tells me that she has a prominent varicose vein in the left lower extremity and in particular behind the left knee.  Over the years she has been wearing compression stockings to mitigate her symptoms.    Over the past few months they have been getting increasingly symptomatic.  She experiences pain through the course of the day and spite of her wearing compression stockings.  She also experiences throbbing and warmth and itching.  This has not started interfering with her activities of day-to-day living as well as her job.  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    She has never had thrombophlebitis.    She has increased her activity level and is doing intermittent leg elevation as well.  However this continues to be a day today issue.    Her mother also had varicose veins.    She has never had any children.    She also notices some spider veins particularly on the lateral aspect of the right lower thigh and in the left medial malleolus area.    On examination: She appears comfortable and she is in no acute distress.  There is a cluster of large ropey varicosities in the left lateral leg and another superficial cluster behind the left knee.  I do not see any evidence of phlebitis.  There are areas of spider veins in the left skin and the left lateral lower thigh.    Diagnosis:  1.  Symptomatic, lifestyle limiting left lower extremity varicose veins with failure of conservative management.  2.  Asymptomatic bilateral lower extremity spider veins.    Plan: I explained the pathophysiology of disease to her in detail.  We will get sonography of the left lower extremity and decide on a course of action given the amount of discomfort and interference with her day-to-day living and her work she is having.  2.  The spider  veins will have to be treated on a cosmetic basis.    I will see her back with her sonography of the left lower extremity.

## 2024-02-08 ENCOUNTER — ANCILLARY PROCEDURE (OUTPATIENT)
Dept: ULTRASOUND IMAGING | Facility: CLINIC | Age: 59
End: 2024-02-08
Attending: SURGERY
Payer: COMMERCIAL

## 2024-02-08 ENCOUNTER — OFFICE VISIT (OUTPATIENT)
Dept: VASCULAR SURGERY | Facility: CLINIC | Age: 59
End: 2024-02-08
Attending: SURGERY
Payer: COMMERCIAL

## 2024-02-08 DIAGNOSIS — I83.812 VARICOSE VEINS OF LEFT LOWER EXTREMITY WITH PAIN: Primary | ICD-10-CM

## 2024-02-08 DIAGNOSIS — I83.812 VARICOSE VEINS OF LEFT LOWER EXTREMITY WITH PAIN: ICD-10-CM

## 2024-02-08 PROCEDURE — 93971 EXTREMITY STUDY: CPT | Mod: LT | Performed by: SURGERY

## 2024-02-08 PROCEDURE — 99213 OFFICE O/P EST LOW 20 MIN: CPT | Performed by: SURGERY

## 2024-02-08 NOTE — PATIENT INSTRUCTIONS
Pre-Procedure Instructions:                       Phlebectomies  You are having Phlebectomies, where one or more of your veins are removed.  Insurance  If your procedure was deemed medically necessary, we will call your insurance company to verify benefits.    Your Current Medications and Allergies  To reduce bruising, please do not take aspirin-type medications (Motrin, Aleve, Ibuprofen, Advil, etc.) for three days before your procedure. You may take Tylenol if you need a pain reliever.  Are you on blood thinner medications? (Plavix, Coumadin, Eliquis, Xarelto) Please discuss this with your surgeon. You may resume taking your blood thinner medication after your procedure.  Are you sensitive to latex or adhesives used for fake fingernails? Please let us know!    Driving Escort and   Please arrange to have a trusted adult (18 years old or older) drive you to and from the clinic.  For your safety, we recommend you have a trusted adult stay with you until the next morning.    Your Health  If you have a change in your health before the procedure, contact our office immediately.  (For example: cold symptoms, cough, urinary tract infection, fever, flu symptoms.)  A pre-procedure physical is not required.     Note  It is sometimes necessary to adjust the procedure schedule due to emergencies. We greatly appreciate your flexibility and understanding in this matter.        _____________________________  ________________________    Check List: The Morning of Your Procedure  ___1. Please do not put anything on your leg(s) or shave the day of your procedure.  ___2. You may take your normal medications the day of your procedure.  ___3. It is recommended you eat a light breakfast or lunch the day of your procedure.  ___4. Wear comfortable loose-fitting clothing and wide-fitting shoes (i.e. tennis shoes, slip-ons).  ___5. Please arrive at our clinic at the specified time given by the nurse.  ___6. You will sign an  affirmation of informed consent.  ___7. Bring your pre-procedure sedation medication (lorazepam and clonidine) with you to the clinic. One hour             before your procedure, you will be instructed to take these medications. The lorazepam (Ativan) lowers             anxiety and sedates you; the clonidine makes the lorazepam more effective. Everyone's body processes             these medications differently. Therefore, reactions to these medications vary. Some people stay awake             and some people sleep through the whole procedure. You may not remember everything about the             procedure or the day. You do not want to make any big decisions for the rest of the day.              The Day of Your Procedure:                                  Phlebectomies  In the Exam Room  A nurse will bring you back to an exam room with your family member or friend. This is when your informed consent will be signed, and you will take your pre-procedure medications.  You will be asked to remove everything from the waist down, including undergarments. You will then put on a hospital gown or shorts and blue booties.  Your surgeon will come in to answer any questions and melyssa any bulging varicose veins to be removed.  You will be taken to the restroom to empty your bladder before going into the procedure room.    In the Procedure Room  You will be escorted to the procedure room. You will lie on a procedure table covered with a sheet or blanket.  A nurse will put a blood pressure cuff on your arm and a pulse/oxygen monitor on a finger. Your vital signs will be monitored every 15 minutes.  Your gown will be pulled up slightly and the groin exposed for a short period of time. The surgeon's assistant will clean your foot, leg, and groin with an antibacterial solution. We will get you covered up as quickly as possible!  Sterile towels and drapes will be used to cover you and the table. You will be asked to keep your hands  under the drapes during the procedure.  The lights will be turned down. The table will be tipped so your feet are higher than your head. You may feel like you're going to slide off, but you won't.  The Procedure  Medication will be injected to numb your leg. You will feel some needle sticks and may feel discomfort as the medication goes in.   Once this is done, you should not experience significant discomfort. But if you do, please let us know and more numbing medication can be injected.  Small incisions are made where the bulging varicose veins have been marked on your leg(s) and these veins will be removed using a small rissa hook instrument.    Post-Procedure  Once the procedure is done, your leg(s) will be washed with warm water and dried. Your leg(s) will be bandaged with large soft dressings and a large ACE bandage wrapped from toes to groin.   You will be offered something to drink and a light snack.  You will rest with your leg(s) elevated for approximately 30 minutes. Your friend or family member may join you.  For your safety, you will be taken to your car in a wheelchair. If you are able to, it is good to keep your leg(s) elevated on the car ride home.        Post-Procedure Instructions:                               Phlebectomies      Post-Op Day Zero - The Day of Your Procedure:  1. Medication for Pain Control and Inflammation Control   - The numbing medication injected during your procedure will last for several hours. The pre-procedure                 tablets may make you very sleepy and you might not remember everything from the procedure or from                 the day. This will usually wear off by the next day.   - Ibuprofen: If tolerated, take ibuprofen (e.g., Advil) to reduce inflammation whether or not you have                 pain. For three days, take two tablets (200mg each) with every meal and at bedtime with a snack. If                 your pain is not controlled with ibuprofen, you may  take prescription pain medication (such as Norco),                 if prescribed.   - You may resume taking any medications you were taking before your procedure.  2. Activity   - Rest with your leg(s) elevated above your heart. This will prevent from a lot of swelling and                 bleeding. You do not need to elevate your leg(s) while sleeping at night. You may go upstairs, sit up to                 eat, use the bathroom, and take several five-minute walks. Otherwise, keep your leg(s) elevated.                 Minimize the amount of time you are up on your feet to about 30 minutes at a time.  3. Bandages   - The incision sites will be covered with soft bandages and an ACE wrap. Keep your bandages on               and dry for 48 hours. The ACE should provide  snug  compression but should not cause pain or               numbness in the toes. If you have significant discomfort or your toes become cold or numb, unwrap               your ACE and rewrap with less tension starting at the toes wrapping upward.  4. Incisions   - Bleeding: You may see some incision sites that are oozing through the bandages. This is not unusual    and can be managed with Rest, Ice, Compression and Elevation (RICE). Apply ice and firm pressure    directly to the site that is bleeding and rest with your leg(s) elevated above your heart for 20-30               minutes.    Post-Op Day One:  1. Medication   - Ibuprofen: Continue the same as the Day of Your Procedure. If your pain is not controlled with    ibuprofen, you may take prescription pain medication (such as Norco), if prescribed.  2. Activity   - We would like you to get up at least six times and walk around for short periods of time, unless it is    causing you pain. You should not be on your feet more than 90 minutes at a time. Elevate your leg    above your heart when you are not walking.  3. Bandages   - Your bandages must be kept on and dry for 48 hours.  4. Driving   - You  may resume driving when you can do so safely. Do not drive if you are taking narcotic pain    medication.  Post-Op Day Two:   1. Medication  - Ibuprofen: Continue the same as the Day of Your Procedure.  2.  Activity   - Walk as tolerated. Elevate as much as possible when not walking.  3. Bandages  - You may remove ACE wrap and padding, unless otherwise instructed by your physician. If your return appointment is before 48 hours, we will take the bandages off for you at the clinic. You may shower like normal after your bandages are removed. To aid in the healing, your physician may recommend wearing compression hose for at least one to two weeks following your phlebectomies.  4. Incisions   - Your leg(s) will be bruised; there may be swelling, hard knots under the skin and possibly some    numbness. These will likely resolve over time. If you see  hair-like  strings coming out of your    incisions, do not pull them (this will only cause pain/discomfort). We will trim them when you come   back for your follow-up appointment.  5. Call Us If:   - You see any areas on your leg that are red and angry in appearance.   - You notice any drainage that is milky or cloudy in appearance or that has a foul odor.   - You run a temperature of 100.5 or greater.    Post-Op Day Three:  You will have a follow up appointment 2-4 days post-procedure. At this appointment, we will check your incisions and see how you are doing.   ________________________________________________________________________________________    The Two Weeks Following Your Procedure  1.  Skin Care   - Do not use any lotions, creams or powders on your incisions for 14 days or until the incisions have               healed.   - Do not soak in a bathtub, hot tub or go swimming for 14 days or until your incisions have healed.  2.  Medications   - You may use ibuprofen or acetaminophen (e.g., Tylenol) as needed for pain or discomfort.  3.  Activity   - Do not lift over  25 pounds. After about ten days you may resume exercise such as aerobics, running,    tennis or weightlifting. Use your common sense and ease back into your exercise routine slowly.  4.  Travel   - Do not fly in an airplane for 14 days after your procedure.  If you have a long car trip planned within    two to three weeks following your procedure, stop and walk for a few minutes every two hours.    Periodic ankle pumps during the ride may be helpful.    Six Week Appointment  - At your six-week appointment, you will see your surgeon for an exam and evaluation. This office visit               will be scheduled when you return for post-op day three return appointment.    Return to Work  1.  If you work outside the home, you may return to work in a few days depending on the extent of your        procedure, how you tolerate it, and the type of work you perform.  2.  Paperwork: If your employer requires paperwork or you would like a letter written to your employer, please        let us know. We will complete disability type forms at no charge. Please allow five business days for forms        to be completed.             Sclerotherapy: Pre-Treatment Instructions    Recommended Sessions:  ___1___ treatment sessions    Pricing: Full session - $407                 *Payment is due at the time of visit following the treatment    Time Required per Treatment Session - About 45 minutes  Please come in 15 minutes before your scheduled appointment.  30 min.  Sclerotherapy treatments last approximately 30 minutes.  5 min.    A staff member will wipe your legs off with warm water and dry them with a wash cloth.                 Then you can put your compression hose on, get dressed and check out.  10 min.  After your treatment, you will be asked to walk around for 10 minutes before you get in your car.    Medications  Five days before your appointment, discontinue aspirin (Bufferin, Anacin, etc.) and Ibuprofen (Motrin, Advil, Aleve,  etc.) to reduce bruising. Resume these medications the day following the treatment.    Leg Preparation  Do not shave your legs or apply any oil, lotion or powder the night before or the day of your treatment.    Clothing  Shorts:  Bring a pair of loose, comfortable shorts to wear during your treatment (or you can choose to wear ours). Shoes: Bring comfortable shoes to accommodate the compression hose after your treatment. Do not wear flip-flops or thong-style sandals unless you have open-toe compression hose.    Photographs  Photos will be taken before each treatment. This helps monitor your progress.    Injections  The physician will inject your veins with the sclerotherapy solution chosen to meet your specific needs.    Compression Hose  Please bring your compression hose if you have them. They may also be reserved for you at our clinic. Compression hose must be worn immediately after each sclerotherapy treatment.  The hose must be compression level 20-30, and they must be worn for 24 hours straight after your treatment.  If you have never worn compression hose before, a staff member will teach you how to put them on.             You cannot have a treatment without compression hose.               They are critical to the success of your treatment!    You may purchase your compression hose from us. We will measure you and have the hose available when you come for your treatment.    Cancellation and Rescheduling  If you need to cancel or reschedule your sclerotherapy treatment, please give our office at least 24 hour notice.    Sclerotherapy: Basic Information    What is sclerotherapy?  Sclerotherapy is a treatment for  spider  veins.  Spider  veins are small veins just under your skin that can look red, blue or purple. Most  spider  veins are only a cosmetic problem.  Spider  veins are not useful and treating them will not affect your circulation.    How does sclerotherapy work?  1.  Injections: A very small needle  is used to inject a solution into the  spider  veins. The solution irritates the cells that line the vein walls. This causes the veins to collapse. The vein walls to stick together and they can no longer carry blood. Different solutions are used based on the size of the veins.  2.  Compression:  The spider veins are kept collapsed by wearing compression stockings. Your body will break down and absorb the treated veins. You wear the compression hose for 24 hours after the treatment and then for 4 more days during your waking hours only.    How does the body heal after sclerotherapy?  The process is similar to how your body heals after a bad bruise. It takes 4-6 weeks or more for the healing to be complete. When the healing is complete, the vein is no longer visible. It may take more than one treatment.    How do I get the best results?  It is important to follow the post-sclerotherapy instructions. The best results require time and patience. The injection sites will continue to heal and fade for months after a treatment. Please discuss your expectations with your doctor to keep them realistic. Your doctor will do everything possible to meet or exceed your expectations.    How many treatments are needed?  After your initial exam, your doctor will give you an estimate of the number of treatments that may be required. It depends upon the size, type, and quantity of your  spider  veins and on the doctor's assessment, your history and expectations. You may end up needing fewer or more treatments.    How soon can I have another treatment?  Additional treatments are scheduled every 4-6 weeks to allow time for the body to respond to the previous treatment.    Common Side Effects:  Itching  The areas that were injected may itch. This is usually mild and lasts less than a day. Do not use lotions or creams on your legs until the injection sites have healed over.    Pain  It is common to have some tenderness at the injection  sites. Injection of the solution can be uncomfortable, but is usually well tolerated by most patients. The tenderness is temporary, lasting 24 hours at most. Tylenol or Ibuprofen can be used, if needed, following the product directions.    Bruising  This may occur at the injections sites. Bruising may be minimized by avoiding Aspirin and Ibuprofen products for five days before each treatment session.    Hyperpigmentation  A light brown discoloration of the skin may develop along the veins in the areas injected. Approximately 20-30% of patients treated note the discoloration (which is lighter and less obvious than the veins that are being treated). The hyperpigmentation usually fades in a couple of weeks, but may take several months to a year to totally resolve. There is a 1% chance of hyperpigmentation continuing after one year.    Trapped blood  A small amount of blood may become trapped and hardened in the veins. This may feel like a knot or cord and it may look dark blue or bruised. This is a common occurrence. You may need to return before your next treatment so this area can be drained to remove the trapped blood. This will reduce the hyperpigmentation that can occur. The chance of this occurring can be decreased with proper use of compression hose after your treatment.    Matting  Matting is the formation of new, fine  spider  veins in the area injected.  It occurs in approximately 10% of patients injected. The exact reason for this is unknown. If untreated, the matting usually resolves in 3 to 12 months, but very rarely, it can be permanent. If the matting does not fade, it can be re-injected.    Rare Side Effects:  Ulceration at injection sites  Very rarely, a small ulcer will occur at the site where a vein is injected. An ulcer can take 4 to 6 weeks to completely heal. A small scar may result.    Allergic reactions  There is a very rare incidence of an allergic reaction to the solution injected. You will be  observed for such reaction and will be treated appropriately should it occur. Please inform us of any allergy history.    Pulmonary embolus/Deep vein thrombosis  This is a blood clot which moves to the lungs/a blood clot in the deep vein system. There is an extraordinarily low incidence of this complication.      SCLEROTHERAPY AFTER CARE  Immediately:  After treatment, walk for 10-15 minutes before getting in your car.  If your trip home is more than 1 hour, stop and walk around for 5-10 minutes. Avoid sitting or standing for extended periods.   First 24 hours: Wear your compression continuously, even while in bed. After the 24 hours, you may shower if you want to. Put your hose back on, unless you are going to bed. You should NOT wear compression to bed after the first 24 hours. You may fly the next day, but wear your compression.   For 5 days: Wear the compression hose for waking hours only. You may continue to wear them longer than 5 days if you prefer.   For days 5-7: Walking is encouraged, as it promotes efficient circulation in your veins. You may do activities that raise your heart rate, but do NOT run, jog, do high impact aerobics, or weight lifting. After 7 days, no activity restrictions.  Shaving: Wait a few days to shave or apply lotion.   Bathing: Do NOT take hot baths or sit in a hot tub for 7-10 days.    For 1 year: Wear SPF 30 sunscreen on your legs when in the sun. This is very important! It helps prevent darkening of the skin at the injection sites.   Medications: You may resume your usual medications, including aspirin or ibuprofen.    Common Things to Expect       Compression must be worn for the first 24 hours and then during the day for 5-7 days.    If larger veins are treated with ultrasound-guided sclerotherapy, you will have redness, firmness, tenderness, and swelling.  This firmness and tenderness may take 3-6 months to resolve. Ibuprofen and compression hose will aid in this process.    You  will have bruising that can last up to 3 weeks. Most fading of the veins will occur between 3 and 6 weeks after treatment.    You may notice brown discoloration (hyperpigmentation) at the treatment site.  This should fade with time, but will take 3 months to 1 year to fully heal.     Some treated veins may look darker because of trapped blood within the vein. This trapped blood can be removed at a minimum of 1 month following treatment. Larger veins are more likely to develop trapped blood.    It is very important for you to use at least SPF 30 sunscreen in order to help prevent the discoloration of your skin.    Migraines rarely occur following sclerotherapy, but are more likely in patients with a history of migraines.  Treat as you would any other migraine.

## 2024-02-08 NOTE — PROGRESS NOTES
February 8, 2024    Vein Procedure Recommendation    Spoke with patient in clinic.    Dr. Swanson has recommended patient to have the following vein procedure(s):     1. Left leg  0.5 Phlebectomies (medically necessary)    2. Bilateral leg Sclerotherapy (cosmetic) one treatment    Patient Pre-op Questions:  Preferred Pharmacy: [facility]   Anticoagulant/ASA: No  Artificial Joint or Heart Valve:  No  Open ulcer:  No  Sedation nausea: Yes      Patient is recommended to wear Thigh High compression hose following her procedure. Patient own a pair of thigh high compression hose and will bring them to her procedure.    Handed patient written procedure instructions to review on her own (see After Visit Summary).    Next steps:    Insurance Submission  Informed patient this process could take up to 14 business days, but once approved, the patient will be contacted by our surgery scheduler to schedule the above procedure. Gave patient our surgery scheduler's information.    Patient is in agreement with all of the above and has no further questions at this time.    Dianna Ruffin RN  [facility]  Vein Clinic

## 2024-02-08 NOTE — PROGRESS NOTES
Mrs. Nkechi Manrique returns to the pain clinic today to go over her sonography.  She is a very pleasant 58-year-old female who had seen previously for left lower extremity discomfort and a prominent cluster of varicose veins in the left lower leg.  She has been wearing compression stockings but her symptoms have just not improved.  She experiences pain through the course of the day and spite of wearing her compression stockings.  She also experiences throbbing and warmth and itching.  This has not started interfering with her activities of day-to-day living as well as her job.  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    Sonography today shows an absent great saphenous vein in the thigh.  The anterior accessory saphenous vein is incompetent but of note this is not giving rise to the symptomatic varicosities which are located in the lower leg.  The small saphenous vein is also incompetent in segments.  The patent segment is in the mid and lower calf.    I do not think there is any need for ablation of the anterior accessory saphenous vein as it does not give rise to the actual dramatic varicose veins.  Same goes for the small saphenous vein because that would end up treating the lower segment of the small saphenous vein which we generally avoid treating with ablation due to its proximity with the saphenous nerve.    We will do phlebectomies of the cluster of varicose veins in the lower left calf area.  There are multiple areas of spider veins which will be treated with a cosmetic approach.    Plan of the procedure discussed with her in detail.

## 2024-02-08 NOTE — LETTER
2024         RE: Nkechi Manrique  [address] [facility] [address]        Dear Colleague,    Thank you for referring your patient, Nkechi Manrique, to the [facility] VEIN CLINIC [place]. Please see a copy of my visit note below.      2024    Vein Procedure Recommendation    {openin}     {VEINCLINICSURGEONS:914720} has recommended patient to have the following vein procedure(s):     1. {LEFTLEG/RIGHTLEG/BILATERALLE} {VEINPROCEDURE:601275}    2. {LEFTLEG/RIGHTLEG/BILATERALLE} {VEINPROCEDURE:561454}    Patient Pre-op Questions:  Preferred Pharmacy: ***  Anticoagulant/ASA: {YES-NO  Default Yes:4444}  Artificial Joint or Heart Valve:  {YES-NO  Default Yes:4444}  Open ulcer:  {YES-NO  Default Yes:4444}  Sedation nausea: {YES-NO  Default Yes:4444}      Patient is recommended to wear {Compression Hose Style:887989} compression hose following {His/her (Lower case):459381} procedure. Discussed compression hose. Explained to patient if insurance doesn't cover the compression hose there are a couple different options to getting them and to call the clinic to let us know if they need help.    {ptdelivery:370988} written procedure instructions to review on {His/her (Lower case):982855} own (see After Visit Summary).    Next steps:    Insurance Submission  Informed patient this process could take up to 14 business days, but once approved, the patient will be contacted by our surgery scheduler to schedule the above procedure. Gave patient our surgery scheduler's information.    {Closin}    Dianna Ruffin RN  [facility]  Vein Clinic    Mrs. Nkechi Manrique returns to the pain clinic today to go over her sonography.  She is a very pleasant 58-year-old female who had seen previously for left lower extremity discomfort and a prominent cluster of varicose veins in the left lower leg.  She has been wearing compression stockings but her symptoms have just not  improved.  She experiences pain through the course of the day and spite of wearing her compression stockings.  She also experiences throbbing and warmth and itching.  This has not started interfering with her activities of day-to-day living as well as her job.  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    Sonography today shows an absent great saphenous vein in the thigh.  The anterior accessory saphenous vein is incompetent but of note this is not giving rise to the symptomatic varicosities which are located in the lower leg.  The small saphenous vein is also incompetent in segments.  The patent segment is in the mid and lower calf.    I do not think there is any need for ablation of the anterior accessory saphenous vein as it does not give rise to the actual dramatic varicose veins.  Same goes for the small saphenous vein because that would end up treating the lower segment of the small saphenous vein which we generally avoid treating with ablation due to its proximity with the saphenous nerve.    We will do phlebectomies of the cluster of varicose veins in the lower left calf area.  There are multiple areas of spider veins which will be treated with a cosmetic approach.    Plan of the procedure discussed with her in detail.    Again, thank you for allowing me to participate in the care of your patient.        Sincerely,        Noé Swanson MD

## 2024-02-16 ENCOUNTER — TELEPHONE (OUTPATIENT)
Dept: OPHTHALMOLOGY | Facility: CLINIC | Age: 59
End: 2024-02-16
Payer: COMMERCIAL

## 2024-02-16 NOTE — TELEPHONE ENCOUNTER
[facility] Call Center    Phone Message    May a detailed message be left on voicemail: yes     Reason for Call: Symptoms or Concerns     If patient has red-flag symptoms, warm transfer to triage line    Current symptom or concern: Redness, irritation and matter coming out of the eye. Left eye    Symptoms have been present for:  2 day(s)    Has patient previously been seen for this? No    By : n/a    Date: n/a    Are there any new or worsening symptoms? Yes: Increase in symptoms    Action Taken: Message routed to:  [facility] eye    Travel Screening: Not Applicable

## 2024-02-16 NOTE — TELEPHONE ENCOUNTER
Patient left voicemail stating no appointment is necessary as she was able to get in with her regular provider       Nancy Pierce on 2/16/2024 at 10:16 AM

## 2024-04-16 ENCOUNTER — ANCILLARY PROCEDURE (OUTPATIENT)
Dept: MAMMOGRAPHY | Facility: CLINIC | Age: 59
End: 2024-04-16
Attending: NURSE PRACTITIONER
Payer: COMMERCIAL

## 2024-04-16 DIAGNOSIS — Z12.31 VISIT FOR SCREENING MAMMOGRAM: ICD-10-CM

## 2024-04-16 PROCEDURE — 77063 BREAST TOMOSYNTHESIS BI: CPT | Performed by: RADIOLOGY

## 2024-04-16 PROCEDURE — 77067 SCR MAMMO BI INCL CAD: CPT | Performed by: RADIOLOGY

## 2024-07-01 ENCOUNTER — TELEPHONE (OUTPATIENT)
Dept: VASCULAR SURGERY | Facility: CLINIC | Age: 59
End: 2024-07-01
Payer: COMMERCIAL

## 2024-07-01 DIAGNOSIS — I83.812 VARICOSE VEINS OF LEFT LOWER EXTREMITY WITH PAIN: Primary | ICD-10-CM

## 2024-07-01 RX ORDER — LORAZEPAM 1 MG/1
TABLET ORAL
Qty: 3 TABLET | Refills: 0 | Status: SHIPPED | OUTPATIENT
Start: 2024-07-01 | End: 2024-07-09

## 2024-07-01 RX ORDER — CLONIDINE HYDROCHLORIDE 0.1 MG/1
TABLET ORAL
Qty: 1 TABLET | Refills: 0 | Status: SHIPPED | OUTPATIENT
Start: 2024-07-01 | End: 2024-07-09

## 2024-07-01 RX ORDER — ONDANSETRON 4 MG/1
TABLET, FILM COATED ORAL
Qty: 1 TABLET | Refills: 0 | Status: SHIPPED | OUTPATIENT
Start: 2024-07-01 | End: 2024-07-09

## 2024-07-01 NOTE — TELEPHONE ENCOUNTER
7/1/2024    Vein Clinic Preoperative Nurse Call    Procedure: Left leg . 5 unit phlebs (med nec) bilat sclero ($)   Date: 7/9/2024  Surgeon: Dr. Swanson  Time: 0730  Check in time: 0630    Called patient and left a detailed message. Informed patient: when to check in (0630) to sign consent, to bring their preop medications in their original bottle with them (3mg ativan, 0.1mg clonidine). Patient will take the medications after signing the consent to the procedure. Instructed patient to wear loose-fitting comfortable clothing, and bring their compression hose. Ensured patient has a /someone that will be responsible for them the rest of the day. Once procedure is completed, we will keep patient in recovery for 30-45 mins, and call  with aftercare instructions. Informed patient, that if possible, they should sit in the backseat to elevate their leg on the ride home.    Pt needs Thigh High compression hose for procedure. Status of the hose: patient has the hose and will bring along the day of the procedure.    Special instructions: I will be sending the patient a 4mg zofran tablet so that she can take it as she had previously noted some nausea with medication. I instructed the patient to take the zofron with breakfast around 0600 so that it will be in her system prior to taking the sedation medication. I told the patient to give us a call if she needed anything additional for her work forms.    Patient understands if they have any of the following symptoms (fever, cough, shortness of breath, rash), they need to notify us immediately as they may need to cancel their procedure and reschedule for a later date.    Gave patient our call back number if any further questions or concerns.    Dianna Ruffin RN  [facility] Vein Clinic

## 2024-07-09 ENCOUNTER — OFFICE VISIT (OUTPATIENT)
Dept: VASCULAR SURGERY | Facility: CLINIC | Age: 59
End: 2024-07-09
Payer: COMMERCIAL

## 2024-07-09 VITALS — SYSTOLIC BLOOD PRESSURE: 129 MMHG | DIASTOLIC BLOOD PRESSURE: 69 MMHG | OXYGEN SATURATION: 95 % | HEART RATE: 72 BPM

## 2024-07-09 DIAGNOSIS — I83.93 SPIDER VEINS OF BOTH LOWER EXTREMITIES: Primary | ICD-10-CM

## 2024-07-09 DIAGNOSIS — I83.812 VARICOSE VEINS OF LEFT LOWER EXTREMITY WITH PAIN: Primary | ICD-10-CM

## 2024-07-09 PROCEDURE — 36468 NJX SCLRSNT SPIDER VEINS: CPT | Performed by: SURGERY

## 2024-07-09 PROCEDURE — S9999 SALES TAX: HCPCS | Performed by: SURGERY

## 2024-07-09 PROCEDURE — 37766 PHLEB VEINS - EXTREM 20+: CPT | Mod: LT | Performed by: SURGERY

## 2024-07-09 RX ORDER — HYDROCODONE BITARTRATE AND ACETAMINOPHEN 5; 325 MG/1; MG/1
1 TABLET ORAL EVERY 6 HOURS PRN
Qty: 8 TABLET | Refills: 0 | Status: SHIPPED | OUTPATIENT
Start: 2024-07-09 | End: 2024-07-11

## 2024-07-09 NOTE — PROGRESS NOTES
Pre-procedure Nursing Note    Nkechi Manrique presents to clinic for Vein Procedure  .   /Person Responsible for Patient: Artemio (Spouse)  Phone Number: [phone]    Patient received medication for sedation and nausea but would like to decline all medication. Patient will do her procedure without sedation.    Compression Stockings: Patient brought with today.  The procedure is being performed on BLE.  Patient understanding of procedure matches consent? YES      Dianna Ruffin RN on 7/9/2024 at 6:27 AM

## 2024-07-09 NOTE — PROGRESS NOTES
Vein Clinic Procedure Note    Preoperative diagnosis:    1.  Left lower extremity symptomatic varicose veins, lifestyle limiting.  2.  Failure of conservative management.    Post operative diagnosis:  Same    Procedure:  1.  Bilateral lower extremity cosmetic injection sclerotherapy of spider veins.  2.  Left lower extremity medically necessary stab phlebectomies.    Preoperative medications: 3 mg ativan, 0.1 mg clonidine      Sclerotherapy    Date/Time: 2024 8:37 AM    Performed by: Noé Swanson MD  Authorized by: Noé Swanson MD    Type:  Cosmetic  Session:  Full  Procedure side:  Bilateral  Syringes:  6  Wrap/Hose:  Wraps      Operative description  Indications: This is a very pleasant 58-year-old female with left lower extremity varicose veins which are lifestyle limiting and have failed conservative management.  Medically necessary stab phlebectomies advised.    Procedure: Patient was identified and then taken to the procedure room and placed in supine position.  Left lower extremity was prepped and a sterile surgical field was created.  Preprocedure timeout was conducted.  Previously marked multiple varicose veins were infiltrated with generous tumescent anesthetic solution and standard phlebectomies were performed.  Sterile compressive dressing was applied.    VNUS: Not applicable.    Stab phlebectomy: 23 number, medically necessary.    EBL: 5 mL        2024     7:58 AM   Flowsheet Data   Procedure Start Time: 07:58   Prep: Chloraprep   Side: Bilateral   # PHLEB Sites: LEFT LE   Sedation taken: No   Pre Pt. Physical / Cognitive Limitations: WNL   TOTAL Tumescent Injected volume (ml): 200   Max Volume Tumescent (ml): 286   Post Pt. Physical / Cognitive Limitations: WNL   Procedure End Time: 08:22   D/C Instructions given, states readiness to leave and escorted to car: Yes     Tumescent Concentration: Total Volume: 572ml - 0.9% Sodium Chloride 500ml Bag +  Lidocaine 1% with  Epinephrine 1:100,000: 60ml + 8.4% Sodium Bicarbonate: 12ml    Patient's blood pressure, pulse and pulse oximetry were continuously monitored throughout the procedure under my direct supervision and was stable during the procedure.    Patient recovered in our suites and discharged home with their family with postoperative instructions and follow up.     Noé Swanson MD

## 2024-07-09 NOTE — LETTER
7/9/2024      Nkechi Manrique  [address] [facility] [address]      Dear Colleague,    Thank you for referring your patient, Nkechi Manrique, to the [facility] VEIN CLINIC [place]. Please see a copy of my visit note below.    Pre-procedure Nursing Note    Nkechi Manrique presents to clinic for Vein Procedure  .   /Person Responsible for Patient: Artemio (Spouse)  Phone Number: [phone]    Patient received medication for sedation and nausea but would like to decline all medication. Patient will do her procedure without sedation.    Compression Stockings: Patient brought with today.  The procedure is being performed on BLE.  Patient understanding of procedure matches consent? YES      Dianna Ruffin RN on 7/9/2024 at 6:27 AM        Vein Clinic Procedure Note    Preoperative diagnosis:    1.  Left lower extremity symptomatic varicose veins, lifestyle limiting.  2.  Failure of conservative management.    Post operative diagnosis:  Same    Procedure:  1.  Bilateral lower extremity cosmetic injection sclerotherapy of spider veins.  2.  Left lower extremity medically necessary stab phlebectomies.    Preoperative medications: 3 mg ativan, 0.1 mg clonidine      Sclerotherapy    Date/Time: 7/9/2024 8:37 AM    Performed by: Noé Swanson MD  Authorized by: Noé Swanson MD    Type:  Cosmetic  Session:  Full  Procedure side:  Bilateral  Syringes:  6  Wrap/Hose:  Wraps      Operative description  Indications: This is a very pleasant 58-year-old female with left lower extremity varicose veins which are lifestyle limiting and have failed conservative management.  Medically necessary stab phlebectomies advised.    Procedure: Patient was identified and then taken to the procedure room and placed in supine position.  Left lower extremity was prepped and a sterile surgical field was created.  Preprocedure timeout was conducted.  Previously marked multiple varicose veins were infiltrated with  generous tumescent anesthetic solution and standard phlebectomies were performed.  Sterile compressive dressing was applied.    VNUS: Not applicable.    Stab phlebectomy: 23 number, medically necessary.    EBL: 5 mL        2024     7:58 AM   Flowsheet Data   Procedure Start Time: 07:58   Prep: Chloraprep   Side: Bilateral   # PHLEB Sites: LEFT LE   Sedation taken: No   Pre Pt. Physical / Cognitive Limitations: WNL   TOTAL Tumescent Injected volume (ml): 200   Max Volume Tumescent (ml): 286   Post Pt. Physical / Cognitive Limitations: WNL   Procedure End Time: 08:22   D/C Instructions given, states readiness to leave and escorted to car: Yes     Tumescent Concentration: Total Volume: 572ml - 0.9% Sodium Chloride 500ml Bag +  Lidocaine 1% with Epinephrine 1:100,000: 60ml + 8.4% Sodium Bicarbonate: 12ml    Patient's blood pressure, pulse and pulse oximetry were continuously monitored throughout the procedure under my direct supervision and was stable during the procedure.    Patient recovered in our suites and discharged home with their family with postoperative instructions and follow up.     Noé Swanson MD      Again, thank you for allowing me to participate in the care of your patient.        Sincerely,        Noé Swanson MD

## 2024-07-11 ENCOUNTER — ALLIED HEALTH/NURSE VISIT (OUTPATIENT)
Dept: VASCULAR SURGERY | Facility: CLINIC | Age: 59
End: 2024-07-11
Payer: COMMERCIAL

## 2024-07-11 DIAGNOSIS — Z09 POSTOP CHECK: Primary | ICD-10-CM

## 2024-07-11 PROCEDURE — 99207 PR NO CHARGE NURSE ONLY: CPT

## 2024-07-11 NOTE — PATIENT INSTRUCTIONS
Vein Removal (Phlebectomy)  Common Things to Expect    Small lumps may develop beneath phlebectomy sites. This is a normal step in healing.  These should not be painful, but may be tender to the touch. It can take 6 weeks to 3 months for these lumps/firmness to resolve. About 3-4 weeks after your phlebectomies, when the bruising has subsided and the incisions are healed, gentle massage will aid in the healing of these small lumps.  Bruising will look worse before it looks better and can last for 4-6 weeks.  Ankle swelling is not uncommon and may last 4-6 weeks.   Numbness will get better with time, but may take 3 months to a year to resolve.  You may notice that the skin on your legs has become ultra-sensitive to touch. For example, the weight of your sheets may feel painful. This usually resolves in 6 weeks.    For 2 weeks, no weight lifting over 25lbs, no running, and no vigorous aerobic exercise. After this time, ease back into your normal activities. If you do too much too soon, you will have more pain, swelling, and bruising. Keep in mind your body is still healing.  For 2 weeks, do not shave your legs or use lotions, powders, creams to allow proper healing of vein access sites.    Wearing compression hose is not required, but highly recommended following phlebectomies as this will aid in the healing process. Compression will minimize your pain, swelling, and bruising.      If you are experiencing any of the following symptoms, please seek immediate medical attention at your local emergency department.  - Significant pain in the back of the calf possibly with difficulty walking  - Significant swelling and/or tenderness in the back of the calf  - Redness that continues to spread  - Chest pain and/or shortness of breath     SCLEROTHERAPY AFTER CARE    For 5 days: Wear the compression hose for waking hours only. You may continue to wear them longer than 5 days if you prefer.   For days 5-7: Walking is encouraged,  as it promotes efficient circulation in your veins. You may do activities that raise your heart rate, but do NOT run, jog, do high impact aerobics, or weight lifting. After 7 days, no activity restrictions.    Shaving: Wait a few days to shave or apply lotion.   Bathing: Do NOT take hot baths or sit in a hot tub for 7-10 days.    For 1 year: Wear SPF 30 sunscreen on your legs when in the sun. This is very important! It helps prevent darkening of the skin at the injection sites.   Medications: You may resume your usual medications, including aspirin or ibuprofen.    Common Things to Expect  -  Compression must be worn for the first 24 hours and then during the day for 5-7 days.    -  If larger veins are treated with ultrasound-guided sclerotherapy, you will have redness, firmness, tenderness, and swelling.  This firmness and tenderness may take 3-6 months to resolve. Ibuprofen and compression hose will aid in this process.    -  You will have bruising that can last up to 3 weeks. Most fading of the veins will occur between 3 and 6 weeks after treatment.    -  You may notice brown discoloration (hyperpigmentation) at the treatment site.  This should fade with time, but will take 3 months to 1 year to fully heal.     -  Some treated veins may look darker because of trapped blood within the vein. This trapped blood can be removed at a minimum of 1 month following treatment. Larger veins are more likely to develop trapped blood.    -  It is very important for you to use at least SPF 30 sunscreen in order to help prevent the discoloration of your skin.    -  Migraines rarely occur following sclerotherapy, but are more likely in patients with a history of migraines.  Treat as you would any other migraine.

## 2024-07-11 NOTE — PROGRESS NOTES
July 11, 2024    Vein Clinic Postoperative Nurse Note    Patient is here for her 48 hour postoperative visit.    Procedure: Left leg 0.5 unit phlebs (med nec) bilateral leg sclero ($)  Procedure Date: 7/9/24  Surgeon: Dr. Swanson    Physical Exam: Incisions are approximated without signs of infection.  Ecchymosis: moderate  Swelling: minimal  Paresthesia: pt denies numbness    Patient Questions or Concerns: Pt wasn't aware her procedure would be so invasive and cause as much bruising as it did. Reassured pt the bruising will get better with time and instructed her to wear her thigh high compression hose for at least 2 weeks. Then, to wear them as needed after that for her discomfort. Pt in agreement with plan.    Also, informed pt she needs to wait for the sclerotherapy to fully take effect as well. It takes time (several) for the spider veins to heal. And pt's usually need more than one session (and each session costs $254 - $407 depending how much medication he uses).    Reviewed postoperative instructions with patient and provided her with written material of common things to expect from her procedure.    Patient's Next Vein Clinic Appointment: 6 week post op with Dr. Swanson (8/29/24).    Nevin Carrera RN  [facility] Vein Clinic

## 2024-08-05 ENCOUNTER — MYC MEDICAL ADVICE (OUTPATIENT)
Dept: PEDIATRICS | Facility: CLINIC | Age: 59
End: 2024-08-05
Payer: COMMERCIAL

## 2024-08-09 ENCOUNTER — TELEPHONE (OUTPATIENT)
Dept: PEDIATRICS | Facility: CLINIC | Age: 59
End: 2024-08-09
Payer: COMMERCIAL

## 2024-08-09 NOTE — TELEPHONE ENCOUNTER
Reason for Call:  Appointment Request    Patient requesting this type of appt:  Weight management     Requested provider: Tawny Woodson    Reason patient unable to be scheduled: Not within requested timeframe    When does patient want to be seen/preferred time:  Open     Comments: She is just checking to see if anything sooner in person or phone     Could we send this information to you in [employer] or would you prefer to receive a phone call?:   Patient would like to be contacted via [employer]    Call taken on 8/9/2024 at 7:48 AM by Ila Dsouza

## 2024-08-12 ENCOUNTER — MYC MEDICAL ADVICE (OUTPATIENT)
Dept: PEDIATRICS | Facility: CLINIC | Age: 59
End: 2024-08-12
Payer: COMMERCIAL

## 2024-08-28 ENCOUNTER — MYC REFILL (OUTPATIENT)
Dept: PEDIATRICS | Facility: CLINIC | Age: 59
End: 2024-08-28
Payer: COMMERCIAL

## 2024-08-28 DIAGNOSIS — N95.2 VAGINAL ATROPHY: ICD-10-CM

## 2024-08-29 ENCOUNTER — OFFICE VISIT (OUTPATIENT)
Dept: VASCULAR SURGERY | Facility: CLINIC | Age: 59
End: 2024-08-29
Payer: COMMERCIAL

## 2024-08-29 DIAGNOSIS — I83.812 VARICOSE VEINS OF LEFT LOWER EXTREMITY WITH PAIN: Primary | ICD-10-CM

## 2024-08-29 PROCEDURE — 99212 OFFICE O/P EST SF 10 MIN: CPT | Performed by: SURGERY

## 2024-08-29 RX ORDER — ESTRADIOL 0.1 MG/G
2 CREAM VAGINAL
Qty: 42.5 G | Refills: 11 | OUTPATIENT
Start: 2024-08-29

## 2024-08-29 NOTE — PROGRESS NOTES
Mrs. Manrique returns for a follow-up.  She is a very pleasant 58-year-old female for whom we did left lower extremity medically necessary stab phlebectomies and bilateral lower extremity cosmetic injection sclerotherapy of varicose veins.    She has had an excellent result.    I do not feel the need for any additional injection sclerotherapy today.    I have advised her to keep an eye on these and if some of them recur then we can do additional sclerotherapy.  Otherwise she can follow-up as needed.    She is going on a trip to [place] with her  in the next few weeks.

## 2024-08-29 NOTE — LETTER
8/29/2024      Nkechi Manrique  [address] [facility] [address]      Dear Colleague,    Thank you for referring your patient, Nkechi Manrique, to the [facility] VEIN CLINIC [place]. Please see a copy of my visit note below.    Mrs. Manrique returns for a follow-up.  She is a very pleasant 58-year-old female for whom we did left lower extremity medically necessary stab phlebectomies and bilateral lower extremity cosmetic injection sclerotherapy of varicose veins.    She has had an excellent result.    I do not feel the need for any additional injection sclerotherapy today.    I have advised her to keep an eye on these and if some of them recur then we can do additional sclerotherapy.  Otherwise she can follow-up as needed.    She is going on a trip to [place] with her  in the next few weeks.    Again, thank you for allowing me to participate in the care of your patient.        Sincerely,        Noé Swanson MD

## 2024-11-12 ENCOUNTER — MYC MEDICAL ADVICE (OUTPATIENT)
Dept: PEDIATRICS | Facility: CLINIC | Age: 59
End: 2024-11-12
Payer: COMMERCIAL

## 2024-11-12 NOTE — TELEPHONE ENCOUNTER
Patient inquiring about setting up visit to examine lymph nodes on neck. Patient denies symptoms but does state she had a cold at the end of September. Patient states the lymph nodes are hard and don't move. Please advise if okay for office visit. Urgency?    Thanks!  Dianna MEJIA RN, BSN  Clinic RN  [facility]

## 2024-11-12 NOTE — TELEPHONE ENCOUNTER
Yes, ftf visit within the next wk, unless other red flag symptoms such as difficulty swallowing or breathing

## 2024-11-13 ASSESSMENT — ASTHMA QUESTIONNAIRES
QUESTION_2 LAST FOUR WEEKS HOW OFTEN HAVE YOU HAD SHORTNESS OF BREATH: ONCE OR TWICE A WEEK
QUESTION_4 LAST FOUR WEEKS HOW OFTEN HAVE YOU USED YOUR RESCUE INHALER OR NEBULIZER MEDICATION (SUCH AS ALBUTEROL): NOT AT ALL
QUESTION_3 LAST FOUR WEEKS HOW OFTEN DID YOUR ASTHMA SYMPTOMS (WHEEZING, COUGHING, SHORTNESS OF BREATH, CHEST TIGHTNESS OR PAIN) WAKE YOU UP AT NIGHT OR EARLIER THAN USUAL IN THE MORNING: NOT AT ALL
QUESTION_1 LAST FOUR WEEKS HOW MUCH OF THE TIME DID YOUR ASTHMA KEEP YOU FROM GETTING AS MUCH DONE AT WORK, SCHOOL OR AT HOME: NONE OF THE TIME
QUESTION_5 LAST FOUR WEEKS HOW WOULD YOU RATE YOUR ASTHMA CONTROL: COMPLETELY CONTROLLED
ACT_TOTALSCORE: 24
ACT_TOTALSCORE: 24

## 2024-11-18 ENCOUNTER — OFFICE VISIT (OUTPATIENT)
Dept: PEDIATRICS | Facility: CLINIC | Age: 59
End: 2024-11-18
Payer: COMMERCIAL

## 2024-11-18 VITALS
RESPIRATION RATE: 16 BRPM | HEART RATE: 60 BPM | SYSTOLIC BLOOD PRESSURE: 118 MMHG | HEIGHT: 67 IN | DIASTOLIC BLOOD PRESSURE: 78 MMHG | BODY MASS INDEX: 31.67 KG/M2 | TEMPERATURE: 98 F | OXYGEN SATURATION: 97 % | WEIGHT: 201.8 LBS

## 2024-11-18 DIAGNOSIS — R59.9 ENLARGED LYMPH NODE: Primary | ICD-10-CM

## 2024-11-18 DIAGNOSIS — H69.92 DYSFUNCTION OF LEFT EUSTACHIAN TUBE: ICD-10-CM

## 2024-11-18 LAB
BASOPHILS # BLD AUTO: 0 10E3/UL (ref 0–0.2)
BASOPHILS NFR BLD AUTO: 0 %
EOSINOPHIL # BLD AUTO: 0.1 10E3/UL (ref 0–0.7)
EOSINOPHIL NFR BLD AUTO: 1 %
ERYTHROCYTE [DISTWIDTH] IN BLOOD BY AUTOMATED COUNT: 12.8 % (ref 10–15)
HCT VFR BLD AUTO: 41.9 % (ref 35–47)
HGB BLD-MCNC: 13.5 G/DL (ref 11.7–15.7)
IMM GRANULOCYTES # BLD: 0 10E3/UL
IMM GRANULOCYTES NFR BLD: 0 %
LYMPHOCYTES # BLD AUTO: 2.2 10E3/UL (ref 0.8–5.3)
LYMPHOCYTES NFR BLD AUTO: 29 %
MCH RBC QN AUTO: 29.7 PG (ref 26.5–33)
MCHC RBC AUTO-ENTMCNC: 32.2 G/DL (ref 31.5–36.5)
MCV RBC AUTO: 92 FL (ref 78–100)
MONOCYTES # BLD AUTO: 0.8 10E3/UL (ref 0–1.3)
MONOCYTES NFR BLD AUTO: 11 %
NEUTROPHILS # BLD AUTO: 4.2 10E3/UL (ref 1.6–8.3)
NEUTROPHILS NFR BLD AUTO: 58 %
PLATELET # BLD AUTO: 169 10E3/UL (ref 150–450)
RBC # BLD AUTO: 4.54 10E6/UL (ref 3.8–5.2)
WBC # BLD AUTO: 7.3 10E3/UL (ref 4–11)

## 2024-11-18 PROCEDURE — 36415 COLL VENOUS BLD VENIPUNCTURE: CPT | Performed by: PHYSICIAN ASSISTANT

## 2024-11-18 PROCEDURE — 99214 OFFICE O/P EST MOD 30 MIN: CPT | Performed by: PHYSICIAN ASSISTANT

## 2024-11-18 PROCEDURE — 85025 COMPLETE CBC W/AUTO DIFF WBC: CPT | Performed by: PHYSICIAN ASSISTANT

## 2024-11-18 ASSESSMENT — PAIN SCALES - GENERAL: PAINLEVEL_OUTOF10: NO PAIN (0)

## 2024-11-18 NOTE — PROGRESS NOTES
"  Assessment & Plan     Enlarged lymph node    - CBC with platelets and differential; Future  - US Head Neck Soft Tissue; Future  - CBC with platelets and differential    Dysfunction of left eustachian tube    - Advised to try Flonase nasal spray.  Can start with two sprays in each nostril once daily for a week and then go to one spray in each nostril once daily for a week and then can use it only as needed.      Patient understands and agrees with the plan today.          BMI  Estimated body mass index is 31.61 kg/m  as calculated from the following:    Height as of this encounter: 1.702 m (5' 7\").    Weight as of this encounter: 91.5 kg (201 lb 12.8 oz).       Bert Arboleda is a 58 year old, presenting for the following health issues:  Mass (Swollen lymph nodes x 6 weeks )        11/18/2024     7:35 AM   Additional Questions   Roomed by Katty Anne   Accompanied by n/a         11/18/2024     7:35 AM   Patient Reported Additional Medications   Patient reports taking the following new medications n/a     Mass    History of Present Illness       Reason for visit:  For past 6 wks or more noticed 1 swollen lymph node & now 2 on left neck  Symptom onset:  More than a month  Symptoms include:  Hard nodes  Symptom intensity:  Mild  Symptom progression:  Worsening  Had these symptoms before:  Yes  Has tried/received treatment for these symptoms:  No  What makes it worse:  Requesting lymph nodes be assessed. Overall, feeling fine   She is taking medications regularly.       Patient is here for concern of swollen lymph nodes on her left neck.  She has not had any symptoms of being ill.  The prominent lymph node has been present for the last 6 weeks.       Review of Systems  Constitutional, neuro, ENT, endocrine, pulmonary, cardiac, gastrointestinal, genitourinary, musculoskeletal, integument and psychiatric systems are negative, except as otherwise noted.      Objective    /78   Pulse 60   Temp 98  F (36.7  C) " "(Tympanic)   Resp 16   Ht 1.702 m (5' 7\")   Wt 91.5 kg (201 lb 12.8 oz)   LMP 10/16/2015 (Exact Date)   SpO2 97%   Breastfeeding No   BMI 31.61 kg/m    Body mass index is 31.61 kg/m .  Physical Exam   GENERAL: alert and no distress  EYES: Eyes grossly normal to inspection, PERRL and conjunctivae and sclerae normal  HENT: normal cephalic/atraumatic, right ear: clear effusion, left ear: clear effusion, nose and mouth without ulcers or lesions, oropharynx clear, and oral mucous membranes moist  NECK: Prominent lymph node palpated on the left side neck, no other lymph nodes appreciated, no asymmetry, masses, or scars  RESP: lungs clear to auscultation - no rales, rhonchi or wheezes  CV: regular rate and rhythm, normal S1 S2, no S3 or S4, no murmur, click or rub, no peripheral edema  MS: no gross musculoskeletal defects noted, no edema          Signed Electronically by: Alyssa Correa PA-C    "

## 2024-11-21 ENCOUNTER — ANCILLARY PROCEDURE (OUTPATIENT)
Dept: ULTRASOUND IMAGING | Facility: CLINIC | Age: 59
End: 2024-11-21
Attending: PHYSICIAN ASSISTANT
Payer: COMMERCIAL

## 2024-11-21 DIAGNOSIS — R59.9 ENLARGED LYMPH NODE: ICD-10-CM

## 2024-11-21 PROCEDURE — 76536 US EXAM OF HEAD AND NECK: CPT

## 2024-11-29 ENCOUNTER — MYC MEDICAL ADVICE (OUTPATIENT)
Dept: PEDIATRICS | Facility: CLINIC | Age: 59
End: 2024-11-29
Payer: COMMERCIAL

## 2024-12-02 ENCOUNTER — TELEPHONE (OUTPATIENT)
Dept: PEDIATRICS | Facility: CLINIC | Age: 59
End: 2024-12-02
Payer: COMMERCIAL

## 2024-12-02 NOTE — TELEPHONE ENCOUNTER
Called Radiology at [phone], Spoke with Adeline.    She is placing a note in Dr. Garcia's queue to have him review the US and provide sizes of the lymph nodes and if follow up imaging is recommended.     Please watch for updated report.

## 2024-12-02 NOTE — TELEPHONE ENCOUNTER
----- Message from Alyssa Correa sent at 11/29/2024  5:09 PM CST -----  Spoke with RN, Pricila.  She will be calling the radiology group to get further information on recommended followup for this patient.  Please let me know once radiology has been contacted.      I am looking to learn more about size of lymph nodes and if followup imaging is recommended.      Thank you for choosing [place] for your health care needs,      Alyssa Correa PA-C

## 2024-12-04 NOTE — TELEPHONE ENCOUNTER
I called this patient, but she did not answer.  I left her a voicmail.        I looked further into this and consulted Dr. Gunter as well, as Tawny is not in the clinic today.    On review, the read from radiology does not show concern of enlarged lymph nodes or something atypical that they would even recommend followup on.    Sometimes they list just facts of what they see, and though US is good, there is some things that may not be clearly visualized on each scan.    At this point, I would still love to hear back from radiology for further explanation, but I do feel it is appropriate to just have the patient monitor the nodes for now as well.  I am happy to repeat labs and US along with do an exam in about 6-8 weeks from now.    Patient can monitor the lymph nodes and if her symptoms change or worsen, she can be seen sooner.      I did try calling the patient to speak with her about this and be sure we can make a workable plan, but she did not answer.  I left her a voicemail to call us back.  If she calls back, this message can be delivered to her.        Thank you,    Alyssa Correa PA-C

## 2024-12-04 NOTE — TELEPHONE ENCOUNTER
Closing this encounter, please refer to encounter dated 11/30/2024 for more info.    JOSE MIGUEL Mcnulty on 12/4/2024 at 12:10 PM

## 2024-12-09 ENCOUNTER — OFFICE VISIT (OUTPATIENT)
Dept: PEDIATRICS | Facility: CLINIC | Age: 59
End: 2024-12-09
Payer: COMMERCIAL

## 2024-12-09 VITALS
HEART RATE: 66 BPM | BODY MASS INDEX: 31.14 KG/M2 | TEMPERATURE: 98 F | RESPIRATION RATE: 18 BRPM | OXYGEN SATURATION: 94 % | HEIGHT: 68 IN | DIASTOLIC BLOOD PRESSURE: 73 MMHG | SYSTOLIC BLOOD PRESSURE: 115 MMHG

## 2024-12-09 DIAGNOSIS — R59.1 LYMPHADENOPATHY: Primary | ICD-10-CM

## 2024-12-09 PROCEDURE — 99214 OFFICE O/P EST MOD 30 MIN: CPT | Performed by: INTERNAL MEDICINE

## 2024-12-09 RX ORDER — NEOMYCIN SULFATE, POLYMYXIN B SULFATE, AND DEXAMETHASONE 3.5; 10000; 1 MG/G; [USP'U]/G; MG/G
OINTMENT OPHTHALMIC
COMMUNITY
Start: 2024-02-16

## 2024-12-09 NOTE — PROGRESS NOTES
"  Assessment & Plan     Lymphadenopathy  Stable - has been ~ 2.5 months with no progression.  No B-type symptoms.  Today no middle ear disease noted.  No lesions or inflammation noted on scalp (she uses a product for dry, itchy scalp, but this is managed, so unlikely a source of lymphadenopathy).  US report very reassuring - no alarming features.  Note: she has a hx of melanoma s/p removal with routine surveillance with dermatology.    Suspect post-COVID vaccine lymphadenopathy given timing and no other identifiable source.    Information from UTD:    Clinically symptomatic lymphadenopathy generally developed within two to four days of vaccination and resolved within two weeks by exam [161], but can persist subclinically for weeks on imaging, with one study reporting a median of 97 days [160].  Reported rates and duration of lymphadenopathy in both trials were based upon clinical assessment, and therefore, the rates of subclinical adenopathy detected by radiologic imaging might be higher, as high as 53 percent in one study, in which most of the imaging was performed by positron emission tomography/computed tomography    We reviewed options and I feel this is something we can continue to monitor clinically.  She has an appointment with PCP in March - I recommend she contact me if anything worsens or changes before then.        I spent a total of 34 minutes on the day of the visit.   Time spent by me today doing chart review, history and exam, documentation and further activities per the note    BMI  Estimated body mass index is 31.14 kg/m  as calculated from the following:    Height as of this encounter: 1.715 m (5' 7.5\").    Weight as of 11/18/24: 91.5 kg (201 lb 12.8 oz).             Bert Arboleda is a 58 year old, presenting for the following health issues:  RECHECK (FOLLOW UP FOR SWOLLEN LYMPH NODES ON LEFT SIDE OF THE NECK)    History of Present Illness       Reason for visit:  Swollen lymph nodes on left " "side of neck    She eats 2-3 servings of fruits and vegetables daily.She consumes 0 sweetened beverage(s) daily.She exercises with enough effort to increase her heart rate 30 to 60 minutes per day.  She exercises with enough effort to increase her heart rate 6 days per week.   She is taking medications regularly.     Lymph nodes:  - End of September, early October first noticed.    Hx of melanoma                Review of Systems  Constitutional, HEENT, cardiovascular, pulmonary, gi and gu systems are negative, except as otherwise noted.      Objective    /73 (BP Location: Right arm, Patient Position: Sitting, Cuff Size: Adult Large)   Pulse 66   Temp 98  F (36.7  C) (Temporal)   Resp 18   Ht 1.715 m (5' 7.5\")   LMP 10/16/2015 (Exact Date)   SpO2 94%   BMI 31.14 kg/m    Body mass index is 31.14 kg/m .  Physical Exam   GENERAL: alert and no distress  EYES: Eyes grossly normal to inspection, PERRL and conjunctivae and sclerae normal  HENT: ear canals and TM's normal, nose and mouth without ulcers or lesions  NECK: cervical adenopathy tender, mobile swollen lymph node at left posterior cervical location, and thyroid normal to palpation  CV: regular rate and rhythm, normal S1 S2, no S3 or S4, no murmur, click or rub, no peripheral edema   ABDOMEN: soft, nontender, no hepatosplenomegaly, no masses and bowel sounds normal            Signed Electronically by: Stella Jaeger MD    "

## 2025-01-28 DIAGNOSIS — N95.2 VAGINAL ATROPHY: ICD-10-CM

## 2025-01-29 RX ORDER — ESTRADIOL 0.1 MG/G
CREAM VAGINAL
Qty: 42.5 G | Refills: 1 | Status: SHIPPED | OUTPATIENT
Start: 2025-01-29

## 2025-02-07 ENCOUNTER — TELEPHONE (OUTPATIENT)
Dept: PEDIATRICS | Facility: CLINIC | Age: 60
End: 2025-02-07
Payer: COMMERCIAL

## 2025-02-07 NOTE — TELEPHONE ENCOUNTER
0800 , 39w6d gestation arrived to L&D unit c/o contractions. Pt taken to Triage bed 1. EFM applied and Triage questions completed. 0830 SVE closed inner oss and 1-2 outer oss/thick/high. Pt provided with apple juice. 9823 Dr. Vasquez Wyatt paged with quick return call. Informed her of recent SVE. Orders received to discharge home after baby is reactive. 1005 FHR not reactive. Acoustic Stimulation provided. 56 Dr. Vasquez Wyatt paged informed her of non reactive NST with acoustic stimulation. Variable deceleration with contraction. Pt hasn't eaten anything today. Orders received to order a food tray. If still not reactive Dr. Vasquez Wyatt will come in and assess her. 1100  Pt requests to sit straight up in bed. 1110 Variables with contractions. Pt assisted to right lateral side. 36 Dr. Vasquez Wyatt at bedside. Deceleration while Dr. Vasquez Wyatt is at bedside. SVE 2/70/-2. Orders received to admit patient. 1150 Pt taken to birthing room 1. Pt ambulated to bathroom to void. [address] Dr. Vasuqez Wyatt at bedside. Acceleration noted. Continue for plan for vaginal delivery. 1326 Deceleration noted. 1 Dr. Vasquez Wyatt paged. [address] 28 Dr. Vasquez Wyatt at bedside. C-section called. 1435 Xray at bedside. 1453 received call - Xray clear    1509 Arrival in PACU    1622 TRANSFER - IN REPORT:    Verbal report received from HOSP DR. YESSI ABURTO) on [facility]  being received from PACU(unit) for routine post - op      Report consisted of patients Situation, Background, Assessment and   Recommendations(SBAR). Information from the following report(s) SBAR, Kardex, Intake/Output and MAR was reviewed with the receiving nurse. Opportunity for questions and clarification was provided. Assessment completed upon patients arrival to unit and care assumed.      [address] clean pads provided    1750 TRANSFER - OUT REPORT:    Verbal report given to SUNNY Nelson RN(name) on [facility]  being transferred Please call to get an update on her lymphadenopathy (see last visit encounter with me).    If lymph nodes have grown or if they are still persisting, it may be worthwhile to repeat the ultrasound in advance of her appt with PCP in March.    Stella Jaeger MD     to postpartum(unit) for routine progression of care       Report consisted of patients Situation, Background, Assessment and   Recommendations(SBAR). Information from the following report(s) SBAR, Kardex, Procedure Summary, Intake/Output, MAR and Recent Results was reviewed with the receiving nurse. Opportunity for questions and clarification was provided.       Patient transported with:   Registered Nurse

## 2025-02-10 NOTE — TELEPHONE ENCOUNTER
"RN attempted to contact patient at [phone]. No answer, left message requesting call back to any triage nurse. Call back number was provided.    Upon call back:  - request update on lymphadenopathy (noted in OV 12/9/24 \"cervical adenopathy tender, mobile swollen lymph node at left posterior cervical location, and thyroid normal to palpation\" )  - if lymph nodes have grown or it they are still persisting, may be worth wile to repeat ultrasound in advance of appt with PCP in March.    Cindy Hazel RN   "

## 2025-02-13 ENCOUNTER — TELEPHONE (OUTPATIENT)
Dept: VASCULAR SURGERY | Facility: CLINIC | Age: 60
End: 2025-02-13
Payer: COMMERCIAL

## 2025-02-13 DIAGNOSIS — I83.812 VARICOSE VEINS OF LEFT LOWER EXTREMITY WITH PAIN: Primary | ICD-10-CM

## 2025-02-13 NOTE — TELEPHONE ENCOUNTER
Called pt back. Per patient request, faxed her compression hose Rx to [facility] at [phone].   Patient would like 3 pair(s) of black, closed-toe, thigh high, 20-30mmhg compression hose. Fax confirmed.    Patient is aware the compression hose will be shipped to her home address.    Nevin Carrera RN  [facility]

## 2025-02-13 NOTE — TELEPHONE ENCOUNTER
Patient called to ask if her prescription for compression stockings could be renewed. Last seen by GEORGETTE 8/29/24    Cassidy Li  [facility]

## 2025-03-11 ENCOUNTER — OFFICE VISIT (OUTPATIENT)
Dept: PEDIATRICS | Facility: CLINIC | Age: 60
End: 2025-03-11
Payer: COMMERCIAL

## 2025-03-11 VITALS
WEIGHT: 200.2 LBS | TEMPERATURE: 98.6 F | DIASTOLIC BLOOD PRESSURE: 79 MMHG | HEART RATE: 79 BPM | SYSTOLIC BLOOD PRESSURE: 135 MMHG | RESPIRATION RATE: 16 BRPM | BODY MASS INDEX: 31.42 KG/M2 | HEIGHT: 67 IN | OXYGEN SATURATION: 97 %

## 2025-03-11 DIAGNOSIS — R59.1 LYMPHADENOPATHY: ICD-10-CM

## 2025-03-11 DIAGNOSIS — N95.2 VAGINAL ATROPHY: ICD-10-CM

## 2025-03-11 DIAGNOSIS — Z00.00 ROUTINE GENERAL MEDICAL EXAMINATION AT A HEALTH CARE FACILITY: Primary | ICD-10-CM

## 2025-03-11 DIAGNOSIS — Z12.31 ENCOUNTER FOR SCREENING MAMMOGRAM FOR BREAST CANCER: ICD-10-CM

## 2025-03-11 DIAGNOSIS — J45.20 MILD INTERMITTENT ASTHMA, UNSPECIFIED WHETHER COMPLICATED: ICD-10-CM

## 2025-03-11 PROCEDURE — 36415 COLL VENOUS BLD VENIPUNCTURE: CPT | Performed by: NURSE PRACTITIONER

## 2025-03-11 PROCEDURE — 80061 LIPID PANEL: CPT | Performed by: NURSE PRACTITIONER

## 2025-03-11 RX ORDER — ALBUTEROL SULFATE 90 UG/1
2 INHALANT RESPIRATORY (INHALATION) EVERY 6 HOURS
Qty: 18 G | Refills: 0 | Status: SHIPPED | OUTPATIENT
Start: 2025-03-11

## 2025-03-11 RX ORDER — ESTRADIOL 0.1 MG/G
CREAM VAGINAL
Qty: 42.5 G | Refills: 11 | Status: SHIPPED | OUTPATIENT
Start: 2025-03-12

## 2025-03-11 SDOH — HEALTH STABILITY: PHYSICAL HEALTH: ON AVERAGE, HOW MANY DAYS PER WEEK DO YOU ENGAGE IN MODERATE TO STRENUOUS EXERCISE (LIKE A BRISK WALK)?: 6 DAYS

## 2025-03-11 ASSESSMENT — PAIN SCALES - GENERAL: PAINLEVEL_OUTOF10: NO PAIN (0)

## 2025-03-11 ASSESSMENT — SOCIAL DETERMINANTS OF HEALTH (SDOH): HOW OFTEN DO YOU GET TOGETHER WITH FRIENDS OR RELATIVES?: MORE THAN THREE TIMES A WEEK

## 2025-03-11 NOTE — PATIENT INSTRUCTIONS
Patient Education   Preventive Care Advice   This is general advice given by our system to help you stay healthy. However, your care team may have specific advice just for you. Please talk to your care team about your preventive care needs.  Nutrition  Eat 5 or more servings of fruits and vegetables each day.  Try wheat bread, brown rice and whole grain pasta (instead of white bread, rice, and pasta).  Get enough calcium and vitamin D. Check the label on foods and aim for 100% of the RDA (recommended daily allowance).  Lifestyle  Exercise at least 150 minutes each week  (30 minutes a day, 5 days a week).  Do muscle strengthening activities 2 days a week. These help control your weight and prevent disease.  No smoking.  Wear sunscreen to prevent skin cancer.  Have a dental exam and cleaning every 6 months.  Yearly exams  See your health care team every year to talk about:  Any changes in your health.  Any medicines your care team has prescribed.  Preventive care, family planning, and ways to prevent chronic diseases.  Shots (vaccines)   HPV shots (up to age 26), if you've never had them before.  Hepatitis B shots (up to age 59), if you've never had them before.  COVID-19 shot: Get this shot when it's due.  Flu shot: Get a flu shot every year.  Tetanus shot: Get a tetanus shot every 10 years.  Pneumococcal, hepatitis A, and RSV shots: Ask your care team if you need these based on your risk.  Shingles shot (for age 50 and up)  General health tests  Diabetes screening:  Starting at age 35, Get screened for diabetes at least every 3 years.  If you are younger than age 35, ask your care team if you should be screened for diabetes.  Cholesterol test: At age 39, start having a cholesterol test every 5 years, or more often if advised.  Bone density scan (DEXA): At age 50, ask your care team if you should have this scan for osteoporosis (brittle bones).  Hepatitis C: Get tested at least once in your life.  STIs (sexually  transmitted infections)  Before age 24: Ask your care team if you should be screened for STIs.  After age 24: Get screened for STIs if you're at risk. You are at risk for STIs (including HIV) if:  You are sexually active with more than one person.  You don't use condoms every time.  You or a partner was diagnosed with a sexually transmitted infection.  If you are at risk for HIV, ask about PrEP medicine to prevent HIV.  Get tested for HIV at least once in your life, whether you are at risk for HIV or not.  Cancer screening tests  Cervical cancer screening: If you have a cervix, begin getting regular cervical cancer screening tests starting at age 21.  Breast cancer scan (mammogram): If you've ever had breasts, begin having regular mammograms starting at age 40. This is a scan to check for breast cancer.  Colon cancer screening: It is important to start screening for colon cancer at age 45.  Have a colonoscopy test every 10 years (or more often if you're at risk) Or, ask your provider about stool tests like a FIT test every year or Cologuard test every 3 years.  To learn more about your testing options, visit:   .  For help making a decision, visit:   https://bit.ly/xn31917.  Prostate cancer screening test: If you have a prostate, ask your care team if a prostate cancer screening test (PSA) at age 55 is right for you.  Lung cancer screening: If you are a current or former smoker ages 50 to 80, ask your care team if ongoing lung cancer screenings are right for you.  For informational purposes only. Not to replace the advice of your health care provider. Copyright   2023 [employer]. All rights reserved. Clinically reviewed by the [facility] Transitions Program. [employer] [id number] - REV 01/24.

## 2025-03-11 NOTE — PROGRESS NOTES
Preventive Care Visit  [facility] SANDOR Mishra CNP, Internal Medicine - Pediatrics  Mar 11, 2025  {Provider  Link to [facility] :309160}    {PROVIDER CHARTING PREFERENCE:382997}    Subjective   Nkechi is a 59 year old, presenting for the following:  Physical        3/11/2025    11:23 AM   Additional Questions   Roomed by mickey   Accompanied by na         3/11/2025    11:23 AM   Patient Reported Additional Medications   Patient reports taking the following new medications na          HPI    Two lymph nodes of L side and she has been seen a couple of time sand had US, hasn't resolved. No new symptoms, no scalp or ear symptoms. No other lymph node involvement.     Wt Readings from Last 4 Encounters:   03/11/25 90.8 kg (200 lb 3.2 oz)   11/18/24 91.5 kg (201 lb 12.8 oz)   11/27/23 90.9 kg (200 lb 8 oz)   05/12/23 91.4 kg (201 lb 9.6 oz)          {MA/LPN/RN Pre-Provider Visit Orders- hCG/UA/Strep (Optional):160226}  {SUPERLIST (Optional):273618}  {additonal problems for provider to add (Optional):152965}  Advance Care Planning  Patient does not have a Health Care Directive: Discussed advance care planning with patient; information given to patient to review.      3/11/2025   General Health   How would you rate your overall physical health? Good   Feel stress (tense, anxious, or unable to sleep) Only a little   (!) STRESS CONCERN      3/11/2025   Nutrition   Three or more servings of calcium each day? Yes   Diet: Regular (no restrictions)   How many servings of fruit and vegetables per day? 4 or more   How many sweetened beverages each day? 0-1         3/11/2025   Exercise   Days per week of moderate/strenous exercise 6 days         3/11/2025   Social Factors   Frequency of gathering with friends or relatives More than three times a week   Worry food won't last until get money to buy more No   Food not last or not have enough money for food? No   Do you have housing? (Housing is  defined as stable permanent housing and does not include staying ouside in a car, in a tent, in an abandoned building, in an overnight shelter, or couch-surfing.) No   Are you worried about losing your housing? No   Lack of transportation? No   Unable to get utilities (heat,electricity)? No   Want help with housing or utility concern? No   (!) HOUSING CONCERN PRESENT      3/11/2025   Fall Risk   Fallen 2 or more times in the past year? No   Trouble with walking or balance? No          3/11/2025   Dental   Dentist two times every year? Yes           Today's PHQ-2 Score:       3/11/2025    11:18 AM   PHQ-2 ( 1999 [employer])   Q1: Little interest or pleasure in doing things 0   Q2: Feeling down, depressed or hopeless 0   PHQ-2 Score 0    Q1: Little interest or pleasure in doing things Not at all   Q2: Feeling down, depressed or hopeless Not at all   PHQ-2 Score 0       Patient-reported           3/11/2025   Substance Use   Alcohol more than 3/day or more than 7/wk No   Do you use any other substances recreationally? No     Social History     Tobacco Use    Smoking status: Never     Passive exposure: Never    Smokeless tobacco: Never   Vaping Use    Vaping status: Never Used   Substance Use Topics    Alcohol use: Yes     Alcohol/week: 1.0 standard drink of alcohol     Comment: 2 drinks per week    Drug use: No     {Provider  If there are gaps in the social history shown above, please follow the link to update and then refresh the note Link to Social and Substance History :989017}      4/16/2024   LAST FHS-7 RESULTS   1st degree relative breast or ovarian cancer No   Any relative bilateral breast cancer No   Any male have breast cancer No   Any ONE woman have BOTH breast AND ovarian cancer No   Any woman with breast cancer before 50yrs No   2 or more relatives with breast AND/OR ovarian cancer Yes   2 or more relatives with breast AND/OR bowel cancer Yes     {If any of the questions to the FHS7 are answered yes, consider  "referral for genetic counseling.    Additional indications for genetic referral include personal history of breast or ovarian cancer, genetic mutation in 1st degree relative which increases risk of breast cancer including BRCA1, BRCA2, VICTOR M, PALB 2, TP53, CHEK2, PTEN, CDH1, STK11 (per ACS) and/or 1st degree relative with history of pancreatic or high-risk prostate cancer (per NCCN):877401}   {Mammogram Decision Support (Optional):110141}        3/11/2025   STI Screening   New sexual partner(s) since last STI/HIV test? No     History of abnormal Pap smear: { :091429}        Latest Ref Rng & Units 11/27/2023     9:27 AM 10/31/2018     2:21 PM 9/27/2013    12:00 AM   PAP / HPV   PAP  Negative for Intraepithelial Lesion or Malignancy (NILM)      PAP (Historical)   NIL  NIL    HPV 16 DNA Negative Negative  Negative     HPV 18 DNA Negative Negative  Negative     Other HR HPV Negative Negative  Negative       ASCVD Risk   The 10-year ASCVD risk score (Jeyson MARC, et al., 2019) is: 2.2%    Values used to calculate the score:      Age: 59 years      Sex: Female      Is Non- : No      Diabetic: No      Tobacco smoker: No      Systolic Blood Pressure: 115 mmHg      Is BP treated: No      HDL Cholesterol: 94 mg/dL      Total Cholesterol: 245 mg/dL    {Link to Fracture Risk Assessment Tool (Optional):921375}    {Provider  REQUIRED FOR AWV Use the storyboard to review patient history, after sections have been marked as reviewed, refresh note to capture documentation:028753}   Reviewed and updated as needed this visit by Provider                    {HISTORY OPTIONS (Optional):249854}    {ROS Picklists (Optional):913615}     Objective    Exam  LMP 10/16/2015 (Exact Date)    Estimated body mass index is 31.14 kg/m  as calculated from the following:    Height as of 12/9/24: 1.715 m (5' 7.5\").    Weight as of 11/18/24: 91.5 kg (201 lb 12.8 oz).    Physical Exam  {Exam Choices " (Optional):523985}        Signed Electronically by: SANDOR Ley CNP  {Email feedback regarding this note to [email or web address]   :229728}

## 2025-03-12 LAB
CHOLEST SERPL-MCNC: 214 MG/DL
FASTING STATUS PATIENT QL REPORTED: NO
HDLC SERPL-MCNC: 90 MG/DL
LDLC SERPL CALC-MCNC: 105 MG/DL
NONHDLC SERPL-MCNC: 124 MG/DL
TRIGL SERPL-MCNC: 96 MG/DL

## 2025-03-13 LAB
ALBUMIN SERPL BCG-MCNC: 4.3 G/DL (ref 3.5–5.2)
ALP SERPL-CCNC: 68 U/L (ref 40–150)
ALT SERPL W P-5'-P-CCNC: 21 U/L (ref 0–50)
ANION GAP SERPL CALCULATED.3IONS-SCNC: 11 MMOL/L (ref 7–15)
AST SERPL W P-5'-P-CCNC: 29 U/L (ref 0–45)
BILIRUB SERPL-MCNC: 0.4 MG/DL
BUN SERPL-MCNC: 14.1 MG/DL (ref 8–23)
CALCIUM SERPL-MCNC: ABNORMAL MG/DL
CHLORIDE SERPL-SCNC: 104 MMOL/L (ref 98–107)
CREAT SERPL-MCNC: 0.74 MG/DL (ref 0.51–0.95)
EGFRCR SERPLBLD CKD-EPI 2021: >90 ML/MIN/1.73M2
FASTING STATUS PATIENT QL REPORTED: NO
GLUCOSE SERPL-MCNC: 105 MG/DL (ref 70–99)
HCO3 SERPL-SCNC: 26 MMOL/L (ref 22–29)
POTASSIUM SERPL-SCNC: 4.5 MMOL/L (ref 3.4–5.3)
PROT SERPL-MCNC: 7.1 G/DL (ref 6.4–8.3)
SODIUM SERPL-SCNC: 141 MMOL/L (ref 135–145)

## 2025-03-18 ENCOUNTER — ANCILLARY PROCEDURE (OUTPATIENT)
Dept: ULTRASOUND IMAGING | Facility: CLINIC | Age: 60
End: 2025-03-18
Attending: NURSE PRACTITIONER
Payer: COMMERCIAL

## 2025-03-18 DIAGNOSIS — R59.1 LYMPHADENOPATHY: ICD-10-CM

## 2025-03-18 PROCEDURE — 76536 US EXAM OF HEAD AND NECK: CPT

## 2025-04-08 ENCOUNTER — TRANSFERRED RECORDS (OUTPATIENT)
Dept: HEALTH INFORMATION MANAGEMENT | Facility: CLINIC | Age: 60
End: 2025-04-08
Payer: COMMERCIAL

## 2025-04-18 ENCOUNTER — ANCILLARY PROCEDURE (OUTPATIENT)
Dept: MAMMOGRAPHY | Facility: CLINIC | Age: 60
End: 2025-04-18
Attending: NURSE PRACTITIONER
Payer: COMMERCIAL

## 2025-04-18 DIAGNOSIS — Z12.31 ENCOUNTER FOR SCREENING MAMMOGRAM FOR BREAST CANCER: ICD-10-CM

## 2025-04-18 PROCEDURE — 77063 BREAST TOMOSYNTHESIS BI: CPT | Mod: GC | Performed by: RADIOLOGY

## 2025-04-18 PROCEDURE — 77067 SCR MAMMO BI INCL CAD: CPT | Mod: GC | Performed by: RADIOLOGY

## 2025-04-21 ENCOUNTER — MYC REFILL (OUTPATIENT)
Dept: PEDIATRICS | Facility: CLINIC | Age: 60
End: 2025-04-21
Payer: COMMERCIAL

## 2025-04-21 DIAGNOSIS — N95.2 VAGINAL ATROPHY: ICD-10-CM

## 2025-04-22 RX ORDER — ESTRADIOL 0.1 MG/G
CREAM VAGINAL
Qty: 42.5 G | Refills: 11 | OUTPATIENT
Start: 2025-04-23

## 2025-05-01 ENCOUNTER — THERAPY VISIT (OUTPATIENT)
Dept: PHYSICAL THERAPY | Facility: CLINIC | Age: 60
End: 2025-05-01
Attending: NURSE PRACTITIONER
Payer: COMMERCIAL

## 2025-05-01 DIAGNOSIS — N95.2 VAGINAL ATROPHY: ICD-10-CM

## 2025-05-01 NOTE — PROGRESS NOTES
PHYSICAL THERAPY EVALUATION  Type of Visit: Evaluation       Fall Risk Screen:  Have you fallen 2 or more times in the past year?: No  Have you fallen and had an injury in the past year?: No    Subjective         Presenting condition or subjective complaint: post menupausal stress incontinence with coughing or sneezing.   Date of onset: 03/11/25 (date of order)    Relevant medical history: Arthritis; Asthma; History of fractures; Menopause; Overweight   Past Medical History:   Diagnosis Date    Abnormal Pap smear 1990    Biopsy- ok    Anxiety     Asthma     Blood dyscrasia     Factor 5 Leiden HTZ    Breast lump 09/27/2013    Bronchospasm     Cataract     CMC arthritis, thumb, degenerative 11/03/2015    Congenital deficiency of other clotting factors     Factor V Leiden positive, had superficial thrombus, no DVT    Factor 5 Leiden mutation, heterozygous     Melanoma in situ of left lower extremity including hip (H)     Mild intermittent asthma with exacerbation     seasonally related, rare albuterol    Motion sickness     Nonsenile cataract     Plantar fascia syndrome     L ft in '10, R ft in '11    Polyp of stomach     Seasonal allergic rhinitis     claritin spring/August    Stress fracture of lower leg ~2002, 2009    Varicosities 2015    Varicose veins removed    Wrist fracture, left, closed, initial encounter 06/02/2022       Dates & types of surgery: cataract wrist fx polpy removal moh   Past Surgical History:   Procedure Laterality Date    BIOPSY  1998 2000 2017    Skin tags moles cysts    COLONOSCOPY  2015    Polyp removed    ESOPHAGOSCOPY, GASTROSCOPY, DUODENOSCOPY (EGD), COMBINED N/A 5/12/2023    Procedure: ESOPHAGOGASTRODUODENOSCOPY WITHSNARE POLYPECTOMY AND CLIPPING;  Surgeon: Roderick Boss MD;  Location: [facility] [place] OR    MOHS MICROGRAPHIC PROCEDURE      PHACOEMULSIFICATION CLEAR CORNEA WITH TORIC INTRAOCULAR LENS IMPLANT Left 8/28/2020    Procedure: LEFT CATARACT REMOVAL WITH INTRAOCULAR  TORIC LENS IMPLANT;  Surgeon: Dianna Knowles MD;  Location: [facility] OR    PHLEBECTOMY MULTIPLE STAB  10/15    Dr. Ashley Lopes         Prior diagnostic imaging/testing results:       Prior therapy history for the same diagnosis, illness or injury: No      Prior Level of Function  Transfers:   Ambulation:   ADL:   IADL:     Living Environment  Social support: With a significant other or spouse   Type of home: [facility]   Stairs to enter the home: No       Ramp: No   Stairs inside the home: Yes 20 Is there a railing: Yes     Help at home: None  Equipment owned:       Employment: Yes RN  Hobbies/Interests: outdoor activities swimming [place]. Strength training 1x/week     Patient goals for therapy: better control of stress incontinence       Objective      PELVIC EVALUATION  ADDITIONAL HISTORY:  Sex assigned at birth: Female  Gender identity: Female    Pronouns: She/Her Hers      Bladder History:  Feels bladder filling: Yes  Triggers for feeling of inability to wait to go to the bathroom: No    How long can you wait to urinate: unknown can wait  Gets up at night to urinate: Yes 0 to twice  Can stop the flow of urine when urinating: Yes  Volume of urine usually released: Average   Other issues:    Number of bladder infections in last 12 months:    Fluid intake per day: 1000mL, 300mL    Medications taken for bladder: No     Activities causing urine leak: Cough; Sneeze; Jump; Run.     Amount of urine typically leaked: drops  Pads used to help with leaking: No        Bowel History:  Frequency of bowel movement: daily  Consistency of stool: Soft    Ignores the urge to defecate: No  Other bowel issues:    Length of time spent trying to have a bowel movement:      Sexual Function History:  Sexual orientation: Straight    Sexually active: Yes  Lubrication used: Yes Yes [place]   Pelvic pain: Initial penetration (rectal or vaginal); Deep penetration (rectal or vaginal). Started 5-8 years ago.     Pain or difficulty  with orgasms/erection/ejaculation:      State of menopause: Post-menopause (I am done with menopause)  Hormone medications: Yes estradiol creme for past year    Are you currently pregnant: No  Number of previous pregnancies: 0  Number of deliveries: 0  Have you been diagnosed with pelvic prolapse or abdominal separation: No  Do you get regular exercise: Yes  I do this type of exercise: walking swimming used to run bike a lot now occassionally  Have you tried pelvic floor strengthening exercises for 4 weeks: No  Do you have any history of trauma that is relevant to your care that you d like to share: No  10 years postmenopausal.     Discussed reason for referral regarding pelvic health needs and external/internal pelvic floor muscle examination with patient/guardian.  Opportunity provided to ask questions and verbal consent for assessment and intervention was given.    PELVIC EXAM  External Visual Inspection:  At rest: Normal  With voluntary pelvic floor contraction: Perineal elevation  Relaxation of PFM: Yes    Integumentary:   Introitus: Atrophy    Internal Digital Palpation:  Deferred, per patient     BIOFEEDBACK:  Position: Supine  Surface Electrodes: Perineal  Perianals:   Baseline muscle activity: 2.5 (1.5-5.4) microvolts    [id number]  Work: 5.3(1.8-13.1)  W-R rise: 1.97  Rest: 3.3(1.5-9.5)  Improved coordination and relaxation with repetitions    [id number]  Work: 5.4(3.0-11.7)  Rest: 4.7(2.4-7.3)  W-R rise: 0.67        Assessment & Plan   CLINICAL IMPRESSIONS  Medical Diagnosis: Vaginal atrophy    Treatment Diagnosis: Pelvic pain, pelvic floor dysfunction   Impression/Assessment: Patient is a 59 year old female with stress urinary incontinence, dysparunia complaints.  The following significant findings have been identified: Pain, Impaired muscle performance, and Decreased activity tolerance. These impairments interfere with their ability to perform self care tasks and recreational activities as  compared to previous level of function.     Clinical Decision Making (Complexity):  Clinical Presentation: Stable/Uncomplicated  Clinical Presentation Rationale: based on medical and personal factors listed in PT evaluation  Clinical Decision Making (Complexity): Low complexity    PLAN OF CARE  Treatment Interventions:  Modalities: Biofeedback, Ultrasound  Interventions: Manual Therapy, Neuromuscular Re-education, Therapeutic Activity, Therapeutic Exercise, Self-Care/Home Management    Long Term Goals     PT Goal 1  Goal Description: Patient will report 0 episodes of incontinence for at least 3 weeks to show improved stress incontinence  Rationale: to maximize safety and independence with performance of ADLs and functional tasks  Target Date: 07/23/25  PT Goal 2  Goal Description: Patient will report at least 75% improved pelvic pain with self management of condition to show improved condition  Rationale: to maximize safety and independence with performance of ADLs and functional tasks;to maximize safety and independence with self cares  Target Date: 07/23/25      Frequency of Treatment: 1x per 2 weeks, adjusting frequency as indicated by patient presentation  Duration of Treatment: 12 weeks    Recommended Referrals to Other Professionals:   Education Assessment:   Learner/Method: Patient;No Barriers to Learning    Risks and benefits of evaluation/treatment have been explained.   Patient/Family/caregiver agrees with Plan of Care.     Evaluation Time:     PT Eval, Low Complexity Minutes (67505): 35       Signing Clinician: Rose De La Cruz, PT

## 2025-05-09 ENCOUNTER — TRANSFERRED RECORDS (OUTPATIENT)
Dept: HEALTH INFORMATION MANAGEMENT | Facility: CLINIC | Age: 60
End: 2025-05-09
Payer: COMMERCIAL

## 2025-05-21 ENCOUNTER — THERAPY VISIT (OUTPATIENT)
Dept: PHYSICAL THERAPY | Facility: CLINIC | Age: 60
End: 2025-05-21
Attending: NURSE PRACTITIONER
Payer: COMMERCIAL

## 2025-05-21 DIAGNOSIS — M62.89 PELVIC FLOOR DYSFUNCTION IN FEMALE: ICD-10-CM

## 2025-05-21 DIAGNOSIS — N95.2 VAGINAL ATROPHY: Primary | ICD-10-CM

## 2025-05-21 PROCEDURE — 97110 THERAPEUTIC EXERCISES: CPT | Mod: GP

## 2025-05-21 PROCEDURE — 97530 THERAPEUTIC ACTIVITIES: CPT | Mod: GP

## 2025-07-20 ENCOUNTER — TRANSFERRED RECORDS (OUTPATIENT)
Dept: HEALTH INFORMATION MANAGEMENT | Facility: CLINIC | Age: 60
End: 2025-07-20
Payer: COMMERCIAL

## (undated) DEVICE — PACK CATARACT CUSTOM ASC SEY15CPUMC

## (undated) DEVICE — SOL WATER IRRIG 1000ML BOTTLE 2F7114

## (undated) DEVICE — TUBING SUCTION MEDI-VAC 1/4"X20' N620A - HE

## (undated) DEVICE — EYE PACK CUSTOM ANTERIOR 30DEG TIP CENTURION PPK6682-04

## (undated) DEVICE — EYE SHIELD PLASTIC

## (undated) DEVICE — CLIP HEMOSTATIC ASSURANCE W11 MM 00711882

## (undated) DEVICE — EYE KNIFE STILETTO VISITEC 1.1MM ANG 45DEG SIDEPORT 376620

## (undated) DEVICE — EYE CANN IRR 27GA ANTERIOR CHAMBER 581280

## (undated) DEVICE — SUCTION MANIFOLD NEPTUNE 2 SYS 1 PORT 702-025-000

## (undated) DEVICE — RETRIEVAL ESCP 230CM 2.5MM RTHNT 360D 5X3CM BX00711197

## (undated) DEVICE — EYE CANN IRR 25GA CYSTOTOME 581610

## (undated) DEVICE — GLOVE PROTEXIS MICRO 6.5  2D73PM65

## (undated) DEVICE — SNARE OVAL MEDIUM DISP 100601

## (undated) DEVICE — LINEN TOWEL PACK X5 5464

## (undated) DEVICE — EYE KNIFE SLIT XSTAR VISITEC 2.6MM 45DEG 373726

## (undated) DEVICE — EYE TIP IRRIGATION & ASPIRATION POLYMER CVD 0.3MM 8065751512

## (undated) RX ORDER — PROPOFOL 10 MG/ML
INJECTION, EMULSION INTRAVENOUS
Status: DISPENSED
Start: 2023-05-12

## (undated) RX ORDER — FENTANYL CITRATE 50 UG/ML
INJECTION, SOLUTION INTRAMUSCULAR; INTRAVENOUS
Status: DISPENSED
Start: 2020-08-28

## (undated) RX ORDER — LIDOCAINE HYDROCHLORIDE 10 MG/ML
INJECTION, SOLUTION EPIDURAL; INFILTRATION; INTRACAUDAL; PERINEURAL
Status: DISPENSED
Start: 2023-05-12